# Patient Record
Sex: FEMALE | Race: WHITE | NOT HISPANIC OR LATINO | Employment: OTHER
[De-identification: names, ages, dates, MRNs, and addresses within clinical notes are randomized per-mention and may not be internally consistent; named-entity substitution may affect disease eponyms.]

---

## 2018-10-09 ENCOUNTER — TRANSCRIBE ORDERS (OUTPATIENT)
Dept: SCHEDULING | Age: 68
End: 2018-10-09

## 2018-10-09 DIAGNOSIS — M85.80 OTHER SPECIFIED DISORDERS OF BONE DENSITY AND STRUCTURE, UNSPECIFIED SITE: Primary | ICD-10-CM

## 2018-10-17 ENCOUNTER — HOSPITAL ENCOUNTER (OUTPATIENT)
Dept: RADIOLOGY | Age: 68
Discharge: HOME | End: 2018-10-17
Attending: OBSTETRICS & GYNECOLOGY
Payer: MEDICARE

## 2018-10-17 DIAGNOSIS — M85.80 OTHER SPECIFIED DISORDERS OF BONE DENSITY AND STRUCTURE, UNSPECIFIED SITE: ICD-10-CM

## 2018-10-17 PROCEDURE — 77080 DXA BONE DENSITY AXIAL: CPT | Mod: GA

## 2018-12-12 ENCOUNTER — OFFICE VISIT (OUTPATIENT)
Dept: PRIMARY CARE | Facility: CLINIC | Age: 68
End: 2018-12-12
Payer: MEDICARE

## 2018-12-12 ENCOUNTER — APPOINTMENT (OUTPATIENT)
Dept: LAB | Facility: CLINIC | Age: 68
End: 2018-12-12
Attending: FAMILY MEDICINE
Payer: MEDICARE

## 2018-12-12 VITALS
WEIGHT: 136 LBS | SYSTOLIC BLOOD PRESSURE: 108 MMHG | TEMPERATURE: 97.8 F | OXYGEN SATURATION: 96 % | RESPIRATION RATE: 16 BRPM | DIASTOLIC BLOOD PRESSURE: 72 MMHG | HEART RATE: 78 BPM

## 2018-12-12 DIAGNOSIS — E55.9 VITAMIN D DEFICIENCY: ICD-10-CM

## 2018-12-12 DIAGNOSIS — E78.2 MIXED HYPERLIPIDEMIA: ICD-10-CM

## 2018-12-12 DIAGNOSIS — R73.9 HYPERGLYCEMIA: Primary | ICD-10-CM

## 2018-12-12 DIAGNOSIS — Z11.59 ENCOUNTER FOR HEPATITIS C SCREENING TEST FOR LOW RISK PATIENT: ICD-10-CM

## 2018-12-12 DIAGNOSIS — Z85.3 HISTORY OF CANCER OF RIGHT BREAST: ICD-10-CM

## 2018-12-12 DIAGNOSIS — Z13.89 SCREENING FOR MULTIPLE CONDITIONS: ICD-10-CM

## 2018-12-12 DIAGNOSIS — R53.83 FATIGUE, UNSPECIFIED TYPE: ICD-10-CM

## 2018-12-12 LAB
25(OH)D3 SERPL-MCNC: 25 NG/ML (ref 30–100)
TSH SERPL DL<=0.05 MIU/L-ACNC: 1.84 MIU/L (ref 0.34–5.6)

## 2018-12-12 PROCEDURE — 86803 HEPATITIS C AB TEST: CPT

## 2018-12-12 PROCEDURE — 99214 OFFICE O/P EST MOD 30 MIN: CPT | Performed by: FAMILY MEDICINE

## 2018-12-12 PROCEDURE — 84443 ASSAY THYROID STIM HORMONE: CPT

## 2018-12-12 PROCEDURE — 82306 VITAMIN D 25 HYDROXY: CPT

## 2018-12-12 PROCEDURE — 36415 COLL VENOUS BLD VENIPUNCTURE: CPT

## 2018-12-12 RX ORDER — ASPIRIN 81 MG/1
81 TABLET ORAL DAILY
COMMUNITY
Start: 2015-10-26

## 2018-12-12 RX ORDER — EZETIMIBE 10 MG/1
10 TABLET ORAL DAILY
COMMUNITY
Start: 2018-11-05 | End: 2021-07-29 | Stop reason: SDUPTHER

## 2018-12-12 RX ORDER — ROSUVASTATIN CALCIUM 10 MG/1
10 TABLET, COATED ORAL DAILY
COMMUNITY
End: 2020-07-08

## 2018-12-12 ASSESSMENT — ENCOUNTER SYMPTOMS
ABDOMINAL DISTENTION: 0
ACTIVITY CHANGE: 0
LIGHT-HEADEDNESS: 0
PALPITATIONS: 0
STRIDOR: 0
VOICE CHANGE: 0
SPEECH DIFFICULTY: 0
CHEST TIGHTNESS: 0
CHOKING: 0
FACIAL ASYMMETRY: 0
BLOOD IN STOOL: 0
MYALGIAS: 0
POLYPHAGIA: 0
SEIZURES: 0
DYSURIA: 0
POLYDIPSIA: 0
COLOR CHANGE: 0
NUMBNESS: 0
JOINT SWELLING: 0
FACIAL SWELLING: 0
FREQUENCY: 0
CHILLS: 0
EYE REDNESS: 0
ANAL BLEEDING: 0
FLANK PAIN: 0
DIAPHORESIS: 0
WHEEZING: 0
EYE ITCHING: 0
PHOTOPHOBIA: 0
EYE DISCHARGE: 0
ABDOMINAL PAIN: 0
DIFFICULTY URINATING: 0
EYE PAIN: 0
FEVER: 0
VOMITING: 0

## 2018-12-12 NOTE — PROGRESS NOTES
Lashay Mejia D.O.  [facility]  [address]  [place] [address]  Phone: [phone]  Fax: [phone]     History of Present Illness   Patient ID: Ashleigh Peng is a 68 y.o. female.    Subjective     HPI  68-year-old female presents today for new patient visit.  Past medical history significant for right breast CA diagnosed in 2007 status post lumpectomy and radiation and followed by Dr. Solorzano oncology at [facility].  Last colonoscopy completed 11/8/16 patient reports was negative for polyp.  Last mammogram completed 4/9/18 through the [facility] system  Most recent DEXA scan completed 10/17/18 showing osteopenia in both hips      1.  History of right breast CA  Diagnosed in 2007 status post lumpectomy and with radiation followed by Dr. Solorzano oncology at [facility] and her GYN Dr. Jasmine.    2.  Hyperlipidemia  Stable.  She is compliant with medication along with a low-cholesterol diet.  She is monitored by cardiology and labs are usually ordered through their office.  Meds-Crestor 10 mg daily / Zetia 10 mg daily  Cardio Dr. Pereyra    3.  Hyperglycemia  Patient has had noted hyperglycemia on her most recent lab results over the past year or so averaging from 103 all the way up to 113.  Patient reports her most recent  A1c 5.6  This result being completed about 2 months ago through the cardiologist's office.       Past Medical/Surgical/Family/Social History     The following have been reviewed and updated as appropriate in this visit:  Problems         No past medical history on file.    No past surgical history on file.    No family history on file.    Social History     Social History   • Marital status:      Spouse name: N/A   • Number of children: N/A   • Years of education: N/A     Occupational History   • Not on file.     Social History Main Topics   • Smoking status: Current Every Day Smoker   • Smokeless  tobacco: Current User   • Alcohol use Yes   • Drug use: No   • Sexual activity: Not on file     Other Topics Concern   • Not on file     Social History Narrative   • No narrative on file      Allergies and Medications     Allergies   Allergen Reactions   • No Known Allergies          Current Outpatient Prescriptions:   •  aspirin 81 mg enteric coated tablet, 81 mg., Disp: , Rfl:   •  calcium carbonate/vitamin D3 (CALCIUM+D ORAL), None Entered, Disp: , Rfl:   •  ezetimibe (ZETIA) 10 mg tablet, , Disp: , Rfl:   •  rosuvastatin (CRESTOR) 10 mg tablet, once daily, Disp: , Rfl:    Review of Systems       Review of Systems   Constitutional: Negative for activity change, chills, diaphoresis and fever.   HENT: Negative for congestion, ear discharge, ear pain, facial swelling, mouth sores, nosebleeds and voice change.    Eyes: Negative for photophobia, pain, discharge, redness and itching.   Respiratory: Negative for choking, chest tightness, wheezing and stridor.    Cardiovascular: Negative for chest pain, palpitations and leg swelling.   Gastrointestinal: Negative for abdominal distention, abdominal pain, anal bleeding, blood in stool and vomiting.   Endocrine: Negative for cold intolerance, heat intolerance, polydipsia and polyphagia.   Genitourinary: Negative for difficulty urinating, dysuria, flank pain, frequency and urgency.   Musculoskeletal: Negative for gait problem, joint swelling and myalgias.   Skin: Negative for color change, pallor and rash.   Neurological: Negative for seizures, facial asymmetry, speech difficulty, light-headedness and numbness.      Physical Examination       Objective     Vitals:    12/12/18 1110   BP: 108/72   Pulse: 78   Resp: 16   Temp: 36.6 °C (97.8 °F)   SpO2: 96%       Vitals:    12/12/18 1110   BP: 108/72   BP Location: Left upper arm   Patient Position: Sitting   Pulse: 78   Resp: 16   Temp: 36.6 °C (97.8 °F)   TempSrc: Oral   SpO2: 96%   Weight: 61.7 kg (136 lb)       Wt Readings  from Last 5 Encounters:   12/12/18 61.7 kg (136 lb)       Ht Readings from Last 5 Encounters:   No data found for Ht       BP Readings from Last 5 Encounters:   12/12/18 108/72       Physical Exam   Constitutional: She is oriented to person, place, and time. She appears well-developed and well-nourished.   HENT:   Head: Normocephalic and atraumatic.   Right Ear: External ear normal.   Left Ear: External ear normal.   Eyes: Conjunctivae and EOM are normal. Pupils are equal, round, and reactive to light.   Neck: Normal range of motion. Neck supple. No JVD present. No tracheal deviation present. No thyromegaly present.   Cardiovascular: Normal rate, regular rhythm, normal heart sounds and intact distal pulses.    Pulmonary/Chest: Effort normal and breath sounds normal. No respiratory distress. She has no wheezes.   Musculoskeletal: She exhibits no edema, tenderness or deformity.   Lymphadenopathy:     She has no cervical adenopathy.   Neurological: She is alert and oriented to person, place, and time. She exhibits normal muscle tone. Coordination normal.   Skin: Skin is warm and dry. No rash noted. No erythema.   Psychiatric: She has a normal mood and affect.   Nursing note and vitals reviewed.     Laboratory Results     Lab Results   Component Value Date    WBC 7.39 06/21/2016    HGB 14.3 06/21/2016    HCT 43.2 06/21/2016    MCV 94.7 06/21/2016     06/21/2016          Chemistry        Component Value Date/Time     06/21/2016 0859    K 4.8 06/21/2016 0859     06/21/2016 0859    CO2 28 06/21/2016 0859    BUN 16 06/21/2016 0859    CREATININE 1.0 06/21/2016 0859        Component Value Date/Time    CALCIUM 9.5 06/21/2016 0859    ALKPHOS 64 06/21/2016 0859    AST 30 06/21/2016 0859    ALT 26 06/21/2016 0859    BILITOT 0.3 06/21/2016 0859            Lab Results   Component Value Date    GLUCOSE 113 (H) 06/21/2016    GLUCOSE 104 (H) 06/03/2015    CALCIUM 9.5 06/21/2016     06/21/2016    K 4.8  06/21/2016    CO2 28 06/21/2016     06/21/2016    BUN 16 06/21/2016    CREATININE 1.0 06/21/2016       Lab Results   Component Value Date    CHOL 207 (H) 06/21/2016    CHOL 196 12/24/2015    CHOL 184 06/03/2015     Lab Results   Component Value Date    HDL 71 06/21/2016    HDL 58 12/24/2015    HDL 62 06/03/2015     Lab Results   Component Value Date    LDLCALC 123 (H) 06/21/2016    LDLCALC 123 (H) 12/24/2015    LDLCALC 113 (H) 06/03/2015     Lab Results   Component Value Date    TRIG 66 06/21/2016    TRIG 73 12/24/2015    TRIG 45 06/03/2015     No results found for: CHOLHDL    No results found for: TSH    Lab Results   Component Value Date    HGBA1C 5.6 06/21/2016    HGBA1C 5.3 06/03/2015       No results found for: HEPCAB      Immunization History   Administered Date(s) Administered   • Hepatitis A 01/15/2015, 06/06/2015   • Influenza Split High Dose Preservative Free IM 10/26/2015   • Influenza Vaccine Quadrivalent 3 Yr And Older 01/01/2009, 01/01/2010, 01/01/2011, 01/01/2012, 01/01/2013, 01/01/2014   • Influenza Vaccine Quadrivalent Preservative Free 6-35 Months 10/31/2016   • Influenza, Unspecified 11/20/2013, 11/01/2016, 10/01/2018   • Pneumococcal Conjugate 13-Valent 10/26/2015   • Pneumococcal Polysaccharide 10/12/2011   • Tdap 10/10/2010   • Zoster 12/09/2015       Health Maintenance Topics with due status: Overdue       Topic Date Due    Medicare Annual Wellness Visit 1950    Hepatitis C Screening 1950    Annual Falls Risk Screening 09/14/2015    Pneumococcal 65+ Years/ High and Highest Risk 10/12/2016     Health Maintenance Topics with due status: Not Due       Topic Last Completion Date    DTaP, Tdap, and Td Vaccines 10/10/2010    Colonoscopy 11/08/2016    Mammogram 04/09/2018    DEXA Scan 10/17/2018     Health Maintenance Topics with due status: Completed / Addressed / Aged Out       Topic Last Completion Date    Zoster Vaccines 12/09/2015    Influenza Vaccine 10/01/2018    HPV Vaccines  Aged Out    Meningococcal Vaccine Aged Out    HIB Vaccines Aged Out    IPV Vaccines Aged Out          Assessment and Plan       Assessment/Plan     Problem List Items Addressed This Visit     Hyperlipidemia    Overview     Overview:          Current Assessment & Plan     Continue with Crestor and Zetia along with a low-cholesterol diet.  Await old records.         Relevant Medications    rosuvastatin (CRESTOR) 10 mg tablet    ezetimibe (ZETIA) 10 mg tablet    Hyperglycemia - Primary    Current Assessment & Plan     Stable dietary lifestyle modification.         History of cancer of right breast    Current Assessment & Plan     Patient will continue with routine follow-up with Dr. [facility] at [facility].         Screening for multiple conditions    Current Assessment & Plan     Hepatitis C screen was ordered along with a TSH and vitamin D3.  Await old records.  She is up-to-date for mammogram, DEXA scan and colonoscopy.  Vaccines are up-to-date.  We will put her on the list for Shingrix vaccine.           Other Visit Diagnoses     Encounter for hepatitis C screening test for low risk patient        Relevant Orders    Hepatitis C antibody    Vitamin D deficiency        Relevant Orders    Vitamin D 25 hydroxy    Fatigue, unspecified type        Relevant Orders    TSH 3rd Generation          No Follow-up on file.    Orders Placed This Encounter   Procedures   • Hepatitis C antibody     Standing Status:   Future     Number of Occurrences:   1     Standing Expiration Date:   12/12/2019     Order Specific Question:   Has the patient fasted?     Answer:   No   • Vitamin D 25 hydroxy     Standing Status:   Future     Number of Occurrences:   1     Standing Expiration Date:   12/12/2019   • TSH 3rd Generation     Standing Status:   Future     Number of Occurrences:   1     Standing Expiration Date:   12/12/2019     Order Specific Question:   Has the patient fasted?     Answer:   No          Av Mejia  DO  12/12/2018

## 2018-12-12 NOTE — ASSESSMENT & PLAN NOTE
Hepatitis C screen was ordered along with a TSH and vitamin D3.  Await old records.  She is up-to-date for mammogram, DEXA scan and colonoscopy.  Vaccines are up-to-date.  We will put her on the list for Shingrix vaccine.

## 2018-12-13 LAB — HCV AB SER QL: NONREACTIVE

## 2018-12-17 NOTE — PROGRESS NOTES
Please notify patient lab results show: Normal  thyroid function.  Hepatitis C screening test was Negative.      Vit D is low Recommend taking Vit D3 otc 2000 iu Qd.

## 2018-12-18 ENCOUNTER — TELEPHONE (OUTPATIENT)
Dept: PRIMARY CARE | Facility: CLINIC | Age: 68
End: 2018-12-18

## 2018-12-18 NOTE — TELEPHONE ENCOUNTER
Informed pt of her result through portal         ----- Message from Av Mejia DO sent at 12/16/2018  8:47 PM EST -----  Please notify patient lab results show: Normal  thyroid function.  Hepatitis C screening test was Negative.      Vit D is low Recommend taking Vit D3 otc 2000 iu Qd.

## 2019-05-29 PROBLEM — Z13.89 SCREENING FOR MULTIPLE CONDITIONS: Status: RESOLVED | Noted: 2018-12-12 | Resolved: 2019-05-29

## 2019-05-29 PROBLEM — Z85.3 HISTORY OF CANCER OF RIGHT BREAST: Status: RESOLVED | Noted: 2018-12-12 | Resolved: 2019-05-29

## 2019-05-30 ENCOUNTER — OFFICE VISIT (OUTPATIENT)
Dept: PRIMARY CARE | Facility: CLINIC | Age: 69
End: 2019-05-30
Payer: MEDICARE

## 2019-05-30 VITALS
HEART RATE: 87 BPM | TEMPERATURE: 98 F | OXYGEN SATURATION: 67 % | SYSTOLIC BLOOD PRESSURE: 122 MMHG | DIASTOLIC BLOOD PRESSURE: 70 MMHG | RESPIRATION RATE: 16 BRPM

## 2019-05-30 DIAGNOSIS — J01.40 ACUTE NON-RECURRENT PANSINUSITIS: Primary | ICD-10-CM

## 2019-05-30 PROCEDURE — 99214 OFFICE O/P EST MOD 30 MIN: CPT | Performed by: NURSE PRACTITIONER

## 2019-05-30 RX ORDER — AMOXICILLIN AND CLAVULANATE POTASSIUM 875; 125 MG/1; MG/1
1 TABLET, FILM COATED ORAL 2 TIMES DAILY
Qty: 20 TABLET | Refills: 0 | Status: SHIPPED | OUTPATIENT
Start: 2019-05-30 | End: 2019-07-09

## 2019-05-30 RX ORDER — FLUTICASONE PROPIONATE 50 MCG
1 SPRAY, SUSPENSION (ML) NASAL DAILY
Qty: 1 BOTTLE | Refills: 0 | Status: SHIPPED | OUTPATIENT
Start: 2019-05-30 | End: 2020-07-08 | Stop reason: ALTCHOICE

## 2019-05-30 ASSESSMENT — ENCOUNTER SYMPTOMS
SINUS PAIN: 0
MYALGIAS: 0
SORE THROAT: 0
COUGH: 1
FEVER: 0
SINUS PRESSURE: 1
RHINORRHEA: 1
STRIDOR: 0
CHEST TIGHTNESS: 0
TROUBLE SWALLOWING: 0
ADENOPATHY: 0
VOMITING: 0
DIAPHORESIS: 0
FATIGUE: 1
SHORTNESS OF BREATH: 0
DIARRHEA: 0
NAUSEA: 0
DIZZINESS: 0
PALPITATIONS: 0
WEAKNESS: 0
WHEEZING: 0
CHILLS: 0
ABDOMINAL PAIN: 0
DYSURIA: 0
APPETITE CHANGE: 0

## 2019-05-30 NOTE — PROGRESS NOTES
Loren Grubbs, TRACE, CRNP, FNP-BC  [facility] [place]  [address]  [place] [address]  Phone: [phone]  Fax: [phone]     History of Present Illness     Subjective     Patient ID: Ashleigh Peng is a 68 y.o. female.    69 yo female pt of Dr. Mejia's presenting today for evaluation of a combination of symptoms which onset almost 3-4 weeks ago, with acute worsening about 10-11 days ago and no improvement with OTC remedies. The patient reports PND, sinus congestion, nasal congestion, sinus pressure at the left side (maxillary and ethmoidal), subjective tiredness and non-productive cough. She didn't think she had a sinus infection, as her nasal mucous was fluctuating between clear and yellow, but no green mucous. 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 She denies sinus pain, fever, chills, myalgias, joint pains and/or aches, chest congestion, wheezing, SOB, VU, productive cough, nausea, vomiting, diarrhea, constipation, chest pain, vision changes, tinnitus, hearing loss, ear pressure/popping, headache and abdominal pain. Has used Mucinex OTC so far.     She has young grandchildren, but no one is sick at this time. Denies PMH of recurrent infections or complicated infections (PNA, COPD, bronchitis, sinus infections, etc). Denies any recent abx use or changes in medications. Patient does have a history of allergic rhinitis as well.          Past Medical/Surgical/Family/Social History     The following have been reviewed and updated as appropriate in this visit:  Allergies  Meds  Problems         Past Medical History:   Diagnosis Date   • Hyperglycemia 12/12/2018   • Hyperlipidemia 10/26/2015    Overview:    • Malignant neoplasm of female breast (CMS/HCC) (HCC) 3/1/2007    Overview:  Dx  BREAST CANCER, Dx Date 3/2007, Histology  , Stage @ Dx  T1,N0, ER/NV+ HERII+, Disease Status  SIN, Tx Status TREATMENT  ONGOING, Comments         History reviewed. No pertinent surgical history.    History reviewed. No pertinent family history.    Social History     Social History   • Marital status:      Spouse name: N/A   • Number of children: N/A   • Years of education: N/A     Occupational History   • Not on file.     Social History Main Topics   • Smoking status: Current Every Day Smoker   • Smokeless tobacco: Current User   • Alcohol use Yes   • Drug use: No   • Sexual activity: Not on file     Other Topics Concern   • Not on file     Social History Narrative   • No narrative on file        Allergies and Medications     Allergies   Allergen Reactions   • No Known Allergies        Current Outpatient Prescriptions   Medication Sig Dispense Refill   • amoxicillin-pot clavulanate (AUGMENTIN) 875-125 mg per tablet Take 1 tablet by mouth 2 (two) times a day for 10 days. 20 tablet 0   • aspirin 81 mg enteric coated tablet 81 mg.     • calcium carbonate/vitamin D3 (CALCIUM+D ORAL) None Entered     • ezetimibe (ZETIA) 10 mg tablet      • fluticasone propionate (FLONASE) 50 mcg/actuation nasal spray Administer 1 spray into each nostril daily. 1 Bottle 0   • rosuvastatin (CRESTOR) 10 mg tablet once daily       No current facility-administered medications for this visit.         Review of Systems     Review of Systems   Constitutional: Positive for fatigue (tiredness). Negative for appetite change, chills, diaphoresis and fever.   HENT: Positive for congestion, postnasal drip, rhinorrhea and sinus pressure (left ethmoidal/maxillary sinus). Negative for ear pain, hearing loss, sinus pain, sore throat and trouble swallowing.    Respiratory: Positive for cough (irritating cough, dry). Negative for chest tightness, shortness of breath, wheezing and stridor.    Cardiovascular: Negative for chest pain and palpitations.   Gastrointestinal: Negative for abdominal pain, diarrhea, nausea and vomiting.   Genitourinary: Negative for dysuria.    Musculoskeletal: Negative for myalgias.   Skin: Negative for rash.   Neurological: Negative for dizziness and weakness.   Hematological: Negative for adenopathy.        Physical Examination     Objective     Vitals:    05/30/19 1041   BP: 122/70   BP Location: Right upper arm   Patient Position: Sitting   Pulse: 87   Resp: 16   Temp: 36.7 °C (98 °F)   TempSrc: Oral   SpO2: (!) 67%       Wt Readings from Last 2 Encounters:   12/12/18 61.7 kg (136 lb)       Ht Readings from Last 2 Encounters:   No data found for Ht       BP Readings from Last 2 Encounters:   05/30/19 122/70   12/12/18 108/72       Physical Exam   Constitutional: She is oriented to person, place, and time. Vital signs are normal.  Non-toxic appearance. She does not have a sickly appearance. She does not appear ill. No distress.   HENT:   Head: Normocephalic and atraumatic.   Right Ear: Hearing, tympanic membrane, external ear and ear canal normal.   Left Ear: Hearing, tympanic membrane, external ear and ear canal normal.   Nose: Mucosal edema (L > R) and rhinorrhea present. Right sinus exhibits no maxillary sinus tenderness and no frontal sinus tenderness. Left sinus exhibits maxillary sinus tenderness (maxillary and ethmoidal regions, pressure and some tenderness with palpation). Left sinus exhibits no frontal sinus tenderness.   Mouth/Throat: Uvula is midline and mucous membranes are normal. Posterior oropharyngeal erythema (c/w PND pattern) present. No oropharyngeal exudate.   Eyes: Conjunctivae and lids are normal. Lids are everted and swept, no foreign bodies found.   Neck: Trachea normal, normal range of motion and full passive range of motion without pain. Neck supple.   Cardiovascular: Normal rate, regular rhythm and normal heart sounds.    Pulmonary/Chest: Effort normal and breath sounds normal. No accessory muscle usage. No tachypnea. No respiratory distress. She has no wheezes.   Lymphadenopathy:        Head (right side): No submental and  no submandibular adenopathy present.        Head (left side): No submental and no submandibular adenopathy present.     She has no cervical adenopathy.   Neurological: She is alert and oriented to person, place, and time.   Skin: Skin is warm, dry and intact.   Psychiatric: She has a normal mood and affect. Her behavior is normal. Judgment and thought content normal.   Nursing note and vitals reviewed.       Laboratory Results     Lab Results   Component Value Date    WBC 7.39 06/21/2016    WBC 5.57 06/03/2015    HGB 14.3 06/21/2016    HGB 13.5 06/03/2015    HCT 43.2 06/21/2016    HCT 40.4 06/03/2015    MCV 94.7 06/21/2016    MCV 95.5 06/03/2015     06/21/2016     06/03/2015        Lab Results   Component Value Date    GLUCOSE 113 (H) 06/21/2016    GLUCOSE 104 (H) 06/03/2015    CALCIUM 9.5 06/21/2016    CALCIUM 9.6 06/03/2015     06/21/2016     06/03/2015    K 4.8 06/21/2016    K 3.8 06/03/2015    CO2 28 06/21/2016    CO2 27 06/03/2015     06/21/2016     06/03/2015    BUN 16 06/21/2016    BUN 14 06/03/2015    CREATININE 1.0 06/21/2016    CREATININE 0.9 06/03/2015    ALKPHOS 64 06/21/2016    ALKPHOS 62 06/03/2015    AST 30 06/21/2016    AST 26 06/03/2015    ALT 26 06/21/2016    ALT 20 06/03/2015    BILITOT 0.3 06/21/2016    BILITOT 0.5 06/03/2015    ALBUMIN 4.3 06/21/2016    ALBUMIN 4.3 06/03/2015       Lab Results   Component Value Date    CHOL 207 (H) 06/21/2016    CHOL 196 12/24/2015     Lab Results   Component Value Date    HDL 71 06/21/2016    HDL 58 12/24/2015     Lab Results   Component Value Date    LDLCALC 123 (H) 06/21/2016    LDLCALC 123 (H) 12/24/2015     Lab Results   Component Value Date    TRIG 66 06/21/2016    TRIG 73 12/24/2015       Lab Results   Component Value Date    TSH 1.84 12/12/2018       Lab Results   Component Value Date    HGBA1C 5.6 06/21/2016    HGBA1C 5.3 06/03/2015       Lab Results   Component Value Date    HEPCAB Nonreactive 12/12/2018       No  results found for: MICROALBCREA    Immunization History   Administered Date(s) Administered   • Hepatitis A 01/15/2015, 06/06/2015   • Influenza Split High Dose Preservative Free IM 10/26/2015   • Influenza Vaccine Quadrivalent 3 Yr And Older 01/01/2009, 01/01/2010, 01/01/2011, 01/01/2012, 01/01/2013, 01/01/2014   • Influenza Vaccine Quadrivalent Preservative Free 6-35 Months 10/31/2016   • Influenza, Unspecified 11/20/2013, 11/01/2016, 10/01/2018   • Pneumococcal Conjugate 13-Valent 10/26/2015   • Pneumococcal Polysaccharide 10/12/2011   • Tdap 10/10/2010   • Zoster 12/09/2015       Health Maintenance Topics with due status: Overdue       Topic Date Due    Medicare Annual Wellness Visit 09/14/2015    Annual Falls Risk Screening 09/14/2015    Zoster Vaccine 02/03/2016    Pneumococcal (65 years and older) 10/26/2016     Health Maintenance Topics with due status: Not Due       Topic Last Completion Date    DTaP, Tdap, and Td Vaccines 10/10/2010    Colonoscopy 11/08/2016    Mammogram 04/09/2018    DEXA Scan 10/17/2018     Health Maintenance Topics with due status: Completed / Addressed / Aged Out       Topic Last Completion Date    Pneumococcal 10/26/2015    Influenza Vaccine 10/01/2018    Hepatitis C Screening 12/12/2018    Meningococcal ACWY Aged Out    Varicella Vaccines Aged Out    HIB Vaccines Aged Out    IPV Vaccines Aged Out    HPV Vaccines Aged Out        Assessment and Plan     Assessment/Plan     Problem List Items Addressed This Visit     Acute non-recurrent pansinusitis - Primary    Current Assessment & Plan     See HPI/PE/ROS.   · Augmentin 875 mg BID x 10 days prescribed. Take with food or probiotic as prescribed.   · Sent Flonase to RX for daily regimen. Instructed to increase to BID use if no relief after 3-4 days. May continue this regimen for 2 weeks or longer if needed.   · Continue Mucinex for dry cough (likely PND-induced), and stated she could try Mucinex-D for decongestant properties as well.  Will take exactly as written on packaging.   · Pt instructed to return to office in 7-10 days if symptoms persist or fail to improve.  · Education re: proper hand hygiene, infection prevention, increasing PO fluid intake, and rest while recovering.  · Pt denied any questions or concerns at this time, and verbalized an understanding of all education/instructions.   · Would recommend ENT evaluation or medrol dose taper in the future if symptoms continue to persist.                    No Follow-up on file.    No orders of the defined types were placed in this encounter.           Loren Grubbs DNP, CRNP, FNP-BC    5/30/2019

## 2019-05-30 NOTE — ASSESSMENT & PLAN NOTE
See HPI/PE/ROS.   · Augmentin 875 mg BID x 10 days prescribed. Take with food or probiotic as prescribed.   · Sent Flonase to RX for daily regimen. Instructed to increase to BID use if no relief after 3-4 days. May continue this regimen for 2 weeks or longer if needed.   · Continue Mucinex for dry cough (likely PND-induced), and stated she could try Mucinex-D for decongestant properties as well. Will take exactly as written on packaging.   · Pt instructed to return to office in 7-10 days if symptoms persist or fail to improve.  · Education re: proper hand hygiene, infection prevention, increasing PO fluid intake, and rest while recovering.  · Pt denied any questions or concerns at this time, and verbalized an understanding of all education/instructions.   · Would recommend ENT evaluation or medrol dose taper in the future if symptoms continue to persist.

## 2019-05-30 NOTE — PATIENT INSTRUCTIONS
· Take the antibiotic exactly as prescribed with food or yogurt. You may also add an over-the-counter probiotic for enhanced benefit and GI protection while taking an antibiotic. Take the Augmentin as directed: One tablet twice per day.  · You may use Mucinex or Mucinex-DM for cough and congestion symptoms throughout the day. This medication contains ingredients and components, so please call your doctor or ask the pharmacist before adding additional Tylenol, ibuprofen, or cough remedies. Take the medication exactly as prescribed on the packaging.  · You may use Flonase nasal spray daily for symptoms of congestion and sinus/nasal pressure.  · Increase sleep and rest, and please let us know if you need an excuse for work or school absence.   · Increase fluids with water, fruit juices and Vitamin-C, and orange juice.  · Please call the office if you feel no improvement in your symptoms by 6/6/19. At this time, I would recommend following with your primary care doctor for further management.   · Present to the ER if you experience a worsening of symptoms, or if you experience the following new symptoms: chest pain, shortness of breath, dyspnea, vomiting, excessive or severe nausea, recurrent and/or frequent diarrhea, severe new pain, new onset headache, vision changes or ringing in the ears, syncope or syncopal episodes. Present for any notice of abnormal or unexpected bleeding. Call the office for any further questions or concerns.

## 2019-07-09 ENCOUNTER — OFFICE VISIT (OUTPATIENT)
Dept: PRIMARY CARE | Facility: CLINIC | Age: 69
End: 2019-07-09
Payer: MEDICARE

## 2019-07-09 VITALS
DIASTOLIC BLOOD PRESSURE: 62 MMHG | HEIGHT: 68 IN | SYSTOLIC BLOOD PRESSURE: 122 MMHG | RESPIRATION RATE: 16 BRPM | HEART RATE: 89 BPM | BODY MASS INDEX: 19.55 KG/M2 | TEMPERATURE: 99 F | OXYGEN SATURATION: 94 % | WEIGHT: 129 LBS

## 2019-07-09 DIAGNOSIS — E78.2 MIXED HYPERLIPIDEMIA: ICD-10-CM

## 2019-07-09 DIAGNOSIS — L03.115 CELLULITIS OF RIGHT LOWER EXTREMITY: Primary | ICD-10-CM

## 2019-07-09 PROCEDURE — 99214 OFFICE O/P EST MOD 30 MIN: CPT | Performed by: FAMILY MEDICINE

## 2019-07-09 RX ORDER — CEPHALEXIN 500 MG/1
500 CAPSULE ORAL 3 TIMES DAILY
Qty: 21 CAPSULE | Refills: 0 | Status: SHIPPED | OUTPATIENT
Start: 2019-07-09 | End: 2019-07-15 | Stop reason: SDUPTHER

## 2019-07-09 ASSESSMENT — ENCOUNTER SYMPTOMS
ANAL BLEEDING: 0
EYE ITCHING: 0
FEVER: 0
DIAPHORESIS: 0
CHILLS: 0
SEIZURES: 0
PHOTOPHOBIA: 0
FLANK PAIN: 0
EYE REDNESS: 0
ABDOMINAL DISTENTION: 0
VOICE CHANGE: 0
STRIDOR: 0
WHEEZING: 0
ABDOMINAL PAIN: 0
COLOR CHANGE: 0
JOINT SWELLING: 0
EYE PAIN: 0
FACIAL ASYMMETRY: 0
ACTIVITY CHANGE: 0
FACIAL SWELLING: 0
VOMITING: 0
BLOOD IN STOOL: 0
POLYDIPSIA: 0
EYE DISCHARGE: 0
SPEECH DIFFICULTY: 0
CHOKING: 0
LIGHT-HEADEDNESS: 0
FREQUENCY: 0
DIFFICULTY URINATING: 0
PALPITATIONS: 0
CHEST TIGHTNESS: 0
MYALGIAS: 0
NUMBNESS: 0
DYSURIA: 0
POLYPHAGIA: 0

## 2019-07-09 NOTE — ASSESSMENT & PLAN NOTE
Patient appears to have an ongoing cellulitis on both hips more so on the right and was started on Keflex 500 mg 3 times daily until finished.  Recommended warm compresses tolerated to the area.  If no significant improvements over the next 7 days she will need to come in for a follow-up visit.

## 2019-07-15 RX ORDER — CEPHALEXIN 500 MG/1
500 CAPSULE ORAL 3 TIMES DAILY
Qty: 21 CAPSULE | Refills: 0 | Status: SHIPPED | OUTPATIENT
Start: 2019-07-15 | End: 2019-07-18

## 2019-07-15 NOTE — TELEPHONE ENCOUNTER
Pharmacy is updated in the system, antibiotic just needs sign off. Spoke with pt and advised her on starting a probiotic and informed her where she can purchase this.

## 2019-07-15 NOTE — TELEPHONE ENCOUNTER
Her cellulitis at both hips and down legs is no longer indurated and red, but is still pink and tender.  She thinks it's going away and since she let it go for so long initially, she thinks she may need to continue treatment for 10-14 days or switch the antibiotic.  She is requesting that you call her.    She is currently at the [facility] so if you plan to continue an antibiotic, she will need to provide pharmacy information

## 2019-07-15 NOTE — TELEPHONE ENCOUNTER
See Larissa's note below, pt would like to know if she should get another round of antibiotics for her cellulitis or if the track that it is on is ok, please advise.

## 2019-07-18 ENCOUNTER — TELEPHONE (OUTPATIENT)
Dept: PRIMARY CARE | Facility: CLINIC | Age: 69
End: 2019-07-18

## 2019-07-18 RX ORDER — CLINDAMYCIN HYDROCHLORIDE 300 MG/1
300 CAPSULE ORAL 3 TIMES DAILY
Qty: 30 CAPSULE | Refills: 0 | Status: SHIPPED | OUTPATIENT
Start: 2019-07-18 | End: 2019-08-19 | Stop reason: ALTCHOICE

## 2019-07-18 NOTE — TELEPHONE ENCOUNTER
See Stefany's note below, pt is calling in reference to the cellulitis on her leg and the fact that it is getting worse, she is asking to speak specifically to you.

## 2019-07-18 NOTE — TELEPHONE ENCOUNTER
Pt called was seen last Tuesday and was diagnosed with cellulitis.  She stated she took the antibiotics that were prescribed.   She states that it is getting worse.  She would like a call back at [phone].    Amanda called again asking when the doctor will call her.  Informed patient that Dr. Mejia is seeing patients and he will call at some point.

## 2019-07-18 NOTE — TELEPHONE ENCOUNTER
Spoke with patient regarding the rash that she has on both hips with some induration she felt Keflex initially helped but she called for refill took 2 or 3 days on the refill dose and now feels that the rash is getting worse more red.  She denies hip pain, fever, chills, night sweats or diarrhea.  We will switch the antibiotic to clindamycin 300 mg 3 times daily for the next 10 days.  She will continue with the probiotic.  I asked her to see a dermatologist as soon as possible for evaluation of this rash/cellulitis.  She will call back with any ongoing concerns.

## 2019-07-30 ENCOUNTER — TELEPHONE (OUTPATIENT)
Dept: PRIMARY CARE | Facility: CLINIC | Age: 69
End: 2019-07-30

## 2019-07-30 DIAGNOSIS — L03.115 CELLULITIS OF RIGHT LOWER EXTREMITY: Primary | ICD-10-CM

## 2019-07-30 DIAGNOSIS — R53.83 OTHER FATIGUE: ICD-10-CM

## 2019-07-30 NOTE — TELEPHONE ENCOUNTER
Pt went to dermatologist for rash on hip area which is now pink and warm.  Dermatologist recommend pt have lyme titers, suleman and any other b/w you would like to order.  Pt states she finished antibiotics and would like a call once b/w is ordered

## 2019-07-30 NOTE — TELEPHONE ENCOUNTER
Please let her know lab work was ordered.  Please ask her why if the dermatologist requested lab work why didn't  they order it?

## 2019-07-30 NOTE — TELEPHONE ENCOUNTER
Dermatologist that pt saw is recommending labs be put into the system for her based on her rash. Lyme titers, suleman, and any other b/w you see fit was advised.

## 2019-07-31 ENCOUNTER — APPOINTMENT (OUTPATIENT)
Dept: LAB | Facility: CLINIC | Age: 69
End: 2019-07-31
Attending: FAMILY MEDICINE
Payer: MEDICARE

## 2019-07-31 DIAGNOSIS — L03.115 CELLULITIS OF RIGHT LOWER EXTREMITY: ICD-10-CM

## 2019-07-31 DIAGNOSIS — R53.83 OTHER FATIGUE: ICD-10-CM

## 2019-07-31 LAB
ALBUMIN SERPL-MCNC: 3.6 G/DL (ref 3.4–5)
ALP SERPL-CCNC: 62 IU/L (ref 35–126)
ALT SERPL-CCNC: 13 IU/L (ref 11–54)
ANION GAP SERPL CALC-SCNC: 9 MEQ/L (ref 3–15)
AST SERPL-CCNC: 19 IU/L (ref 15–41)
BASOPHILS # BLD: 0.02 K/UL (ref 0.01–0.1)
BASOPHILS NFR BLD: 0.3 %
BILIRUB SERPL-MCNC: 0.8 MG/DL (ref 0.3–1.2)
BUN SERPL-MCNC: 10 MG/DL (ref 8–20)
CALCIUM SERPL-MCNC: 9.3 MG/DL (ref 8.9–10.3)
CHLORIDE SERPL-SCNC: 106 MEQ/L (ref 98–109)
CO2 SERPL-SCNC: 27 MEQ/L (ref 22–32)
CREAT SERPL-MCNC: 1 MG/DL
DIFFERENTIAL METHOD BLD: NORMAL
EOSINOPHIL # BLD: 0.23 K/UL (ref 0.04–0.36)
EOSINOPHIL NFR BLD: 3.4 %
ERYTHROCYTE [DISTWIDTH] IN BLOOD BY AUTOMATED COUNT: 12.8 % (ref 11.7–14.4)
ERYTHROCYTE [SEDIMENTATION RATE] IN BLOOD BY WESTERGREN METHOD: 7 MM/HR
GFR SERPL CREATININE-BSD FRML MDRD: 55.1 ML/MIN/1.73M*2
GLUCOSE SERPL-MCNC: 92 MG/DL (ref 70–99)
HCT VFR BLDCO AUTO: 39 %
HGB BLD-MCNC: 12.4 G/DL
IMM GRANULOCYTES # BLD AUTO: 0.05 K/UL (ref 0–0.08)
IMM GRANULOCYTES NFR BLD AUTO: 0.7 %
LYMPHOCYTES # BLD: 1.54 K/UL (ref 1.2–3.5)
LYMPHOCYTES NFR BLD: 23 %
MCH RBC QN AUTO: 31.3 PG (ref 28–33.2)
MCHC RBC AUTO-ENTMCNC: 31.8 G/DL (ref 32.2–35.5)
MCV RBC AUTO: 98.5 FL (ref 83–98)
MONOCYTES # BLD: 0.37 K/UL (ref 0.28–0.8)
MONOCYTES NFR BLD: 5.5 %
NEUTROPHILS # BLD: 4.49 K/UL (ref 1.7–7)
NEUTS SEG NFR BLD: 67.1 %
NRBC BLD-RTO: 0 %
PDW BLD AUTO: 8.3 FL (ref 9.4–12.3)
PLATELET # BLD AUTO: 326 K/UL
POTASSIUM SERPL-SCNC: 4.2 MEQ/L (ref 3.6–5.1)
PROT SERPL-MCNC: 6.2 G/DL (ref 6–8.2)
RBC # BLD AUTO: 3.96 M/UL (ref 3.93–5.22)
SODIUM SERPL-SCNC: 142 MEQ/L (ref 136–144)
TSH SERPL DL<=0.05 MIU/L-ACNC: 1.55 MIU/L (ref 0.34–5.6)
WBC # BLD AUTO: 6.7 K/UL

## 2019-07-31 PROCEDURE — 36415 COLL VENOUS BLD VENIPUNCTURE: CPT

## 2019-07-31 PROCEDURE — 85652 RBC SED RATE AUTOMATED: CPT

## 2019-07-31 PROCEDURE — 85025 COMPLETE CBC W/AUTO DIFF WBC: CPT

## 2019-07-31 PROCEDURE — 86038 ANTINUCLEAR ANTIBODIES: CPT

## 2019-07-31 PROCEDURE — 84443 ASSAY THYROID STIM HORMONE: CPT

## 2019-07-31 PROCEDURE — 80053 COMPREHEN METABOLIC PANEL: CPT

## 2019-07-31 PROCEDURE — 86618 LYME DISEASE ANTIBODY: CPT

## 2019-07-31 NOTE — TELEPHONE ENCOUNTER
"Informed pt that her labs were entered and she already went to lab to have them drawn. 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   "

## 2019-08-01 LAB
ANA SER QL IA: NEGATIVE
B BURGDOR AB SER IA-ACNC: 0.21 RATIO

## 2019-08-05 NOTE — PROGRESS NOTES
Rito Sotelo,  Your recent lab results are a follows:   Normal blood count, sugar, thyroid and kidney function.  Lyme Dz test is negative.  The screening autoimmune test is Negative.  Inflammatory marker was Negative.  Please follow up with Derm and let me know what they think the diagnosis is and if you may need a biopsy of the area to ascertain what is going on.

## 2019-08-19 ENCOUNTER — OFFICE VISIT (OUTPATIENT)
Dept: PRIMARY CARE | Facility: CLINIC | Age: 69
End: 2019-08-19
Payer: MEDICARE

## 2019-08-19 ENCOUNTER — APPOINTMENT (OUTPATIENT)
Dept: LAB | Facility: CLINIC | Age: 69
End: 2019-08-19
Attending: FAMILY MEDICINE
Payer: MEDICARE

## 2019-08-19 VITALS
BODY MASS INDEX: 19.22 KG/M2 | WEIGHT: 126.8 LBS | TEMPERATURE: 98.1 F | HEART RATE: 95 BPM | SYSTOLIC BLOOD PRESSURE: 118 MMHG | RESPIRATION RATE: 16 BRPM | DIASTOLIC BLOOD PRESSURE: 68 MMHG | OXYGEN SATURATION: 95 % | HEIGHT: 68 IN

## 2019-08-19 DIAGNOSIS — E53.8 VITAMIN B12 DEFICIENCY: ICD-10-CM

## 2019-08-19 DIAGNOSIS — E53.8 VITAMIN B12 DEFICIENCY: Primary | ICD-10-CM

## 2019-08-19 DIAGNOSIS — M25.50 ARTHRALGIA, UNSPECIFIED JOINT: ICD-10-CM

## 2019-08-19 PROBLEM — J01.40 ACUTE NON-RECURRENT PANSINUSITIS: Status: RESOLVED | Noted: 2019-05-30 | Resolved: 2019-08-19

## 2019-08-19 PROBLEM — M25.579 ARTHRALGIA OF ANKLE: Status: ACTIVE | Noted: 2019-08-19

## 2019-08-19 PROBLEM — M25.579 ARTHRALGIA OF ANKLE: Status: RESOLVED | Noted: 2019-08-19 | Resolved: 2019-08-19

## 2019-08-19 LAB
ALBUMIN SERPL-MCNC: 3.7 G/DL (ref 3.4–5)
ALP SERPL-CCNC: 64 IU/L (ref 35–126)
ALT SERPL-CCNC: 13 IU/L (ref 11–54)
ANION GAP SERPL CALC-SCNC: 9 MEQ/L (ref 3–15)
AST SERPL-CCNC: 20 IU/L (ref 15–41)
BASOPHILS # BLD: 0.03 K/UL (ref 0.01–0.1)
BASOPHILS NFR BLD: 0.5 %
BILIRUB SERPL-MCNC: 0.5 MG/DL (ref 0.3–1.2)
BUN SERPL-MCNC: 9 MG/DL (ref 8–20)
CALCIUM SERPL-MCNC: 9.4 MG/DL (ref 8.9–10.3)
CHLORIDE SERPL-SCNC: 105 MEQ/L (ref 98–109)
CO2 SERPL-SCNC: 27 MEQ/L (ref 22–32)
CREAT SERPL-MCNC: 0.9 MG/DL
CRP SERPL-MCNC: <=6 MG/L
DIFFERENTIAL METHOD BLD: NORMAL
EOSINOPHIL # BLD: 0.2 K/UL (ref 0.04–0.36)
EOSINOPHIL NFR BLD: 3.5 %
ERYTHROCYTE [DISTWIDTH] IN BLOOD BY AUTOMATED COUNT: 12.5 % (ref 11.7–14.4)
ERYTHROCYTE [SEDIMENTATION RATE] IN BLOOD BY WESTERGREN METHOD: 18 MM/HR
GFR SERPL CREATININE-BSD FRML MDRD: >60 ML/MIN/1.73M*2
GLUCOSE SERPL-MCNC: 111 MG/DL (ref 70–99)
HCT VFR BLDCO AUTO: 38.9 %
HGB BLD-MCNC: 12.5 G/DL
IMM GRANULOCYTES # BLD AUTO: 0.01 K/UL (ref 0–0.08)
IMM GRANULOCYTES NFR BLD AUTO: 0.2 %
LYMPHOCYTES # BLD: 1.68 K/UL (ref 1.2–3.5)
LYMPHOCYTES NFR BLD: 29.1 %
MCH RBC QN AUTO: 31.1 PG (ref 28–33.2)
MCHC RBC AUTO-ENTMCNC: 32.1 G/DL (ref 32.2–35.5)
MCV RBC AUTO: 96.8 FL (ref 83–98)
MONOCYTES # BLD: 0.34 K/UL (ref 0.28–0.8)
MONOCYTES NFR BLD: 5.9 %
NEUTROPHILS # BLD: 3.51 K/UL (ref 1.7–7)
NEUTS SEG NFR BLD: 60.8 %
NRBC BLD-RTO: 0 %
PDW BLD AUTO: 9 FL (ref 9.4–12.3)
PLATELET # BLD AUTO: 330 K/UL
POTASSIUM SERPL-SCNC: 4.3 MEQ/L (ref 3.6–5.1)
PROT SERPL-MCNC: 6.5 G/DL (ref 6–8.2)
RBC # BLD AUTO: 4.02 M/UL (ref 3.93–5.22)
SODIUM SERPL-SCNC: 141 MEQ/L (ref 136–144)
URATE SERPL-MCNC: 5.5 MG/DL (ref 2.6–8)
VIT B12 SERPL-MCNC: 229 PG/ML (ref 180–914)
WBC # BLD AUTO: 5.77 K/UL

## 2019-08-19 PROCEDURE — 84166 PROTEIN E-PHORESIS/URINE/CSF: CPT

## 2019-08-19 PROCEDURE — 82040 ASSAY OF SERUM ALBUMIN: CPT

## 2019-08-19 PROCEDURE — 85025 COMPLETE CBC W/AUTO DIFF WBC: CPT

## 2019-08-19 PROCEDURE — 86618 LYME DISEASE ANTIBODY: CPT

## 2019-08-19 PROCEDURE — 86431 RHEUMATOID FACTOR QUANT: CPT

## 2019-08-19 PROCEDURE — 84165 PROTEIN E-PHORESIS SERUM: CPT

## 2019-08-19 PROCEDURE — 86140 C-REACTIVE PROTEIN: CPT

## 2019-08-19 PROCEDURE — 86038 ANTINUCLEAR ANTIBODIES: CPT

## 2019-08-19 PROCEDURE — 36415 COLL VENOUS BLD VENIPUNCTURE: CPT

## 2019-08-19 PROCEDURE — 82607 VITAMIN B-12: CPT

## 2019-08-19 PROCEDURE — 84550 ASSAY OF BLOOD/URIC ACID: CPT

## 2019-08-19 PROCEDURE — 85652 RBC SED RATE AUTOMATED: CPT

## 2019-08-19 PROCEDURE — 99213 OFFICE O/P EST LOW 20 MIN: CPT | Performed by: FAMILY MEDICINE

## 2019-08-19 ASSESSMENT — ENCOUNTER SYMPTOMS
VOMITING: 0
BLOOD IN STOOL: 0
SHORTNESS OF BREATH: 0
FEVER: 0
ABDOMINAL PAIN: 0
COUGH: 0
NAUSEA: 0
DIARRHEA: 0
CHILLS: 0
FATIGUE: 1
JOINT SWELLING: 1
UNEXPECTED WEIGHT CHANGE: 1
ARTHRALGIAS: 1
WHEEZING: 0
PALPITATIONS: 0

## 2019-08-19 NOTE — PROGRESS NOTES
Peter Solorzano MD    [facility]  [address]  [place] [address]  Phone: [phone]  Fax: [phone]     History of Present Illness     Subjective     Patient ID: Ashleigh Peng is a 68 y.o. female.    In 06/2015, hips felt tender to touch only when pressing on it. In July the right hip had red rash, tender and red. Treated for cellulitis. Given Keflex. Never resolved. Then treated with Clindamycin. Then developed lesion on the left side. Saw dermatologist, did not think it was dermatologic in nature. Has new onset fatigue. Lost 7 pounds since 07/2019. Now having stiffness of hands and feet (bottom of feet) Has some swelling of right ankle. Also some tenderness. Has some night sweats. No recent travel. Was in [place] in 04/2019. No new medications. No sick contacts at home.          Past Medical/Surgical/Family/Social History       The following have been reviewed and updated as appropriate in this visit:  Allergies  Meds  Problems         Past Medical History:   Diagnosis Date   • Hyperglycemia 12/12/2018   • Hyperlipidemia 10/26/2015    Overview:    • Malignant neoplasm of female breast (CMS/HCC) (HCC) 3/1/2007    Overview:  Dx  BREAST CANCER, Dx Date 3/2007, Histology  , Stage @ Dx  T1,N0, ER/DE+ HERII+, Disease Status  SIN, Tx Status TREATMENT ONGOING, Comments         No past surgical history on file.    No family history on file.    Social History     Social History   • Marital status:      Spouse name: N/A   • Number of children: N/A   • Years of education: N/A     Occupational History   • Not on file.     Social History Main Topics   • Smoking status: Current Every Day Smoker   • Smokeless tobacco: Current User   • Alcohol use Yes   • Drug use: No   • Sexual activity: Not on file     Other Topics Concern   • Not on file     Social History Narrative   • No narrative on file       Patient Care Team:  Av Mejia DO as PCP - General (Family  "Medicine)  Pepe Jasmine MD as Obstetrician (Obstetrics and Gynecology)     Allergies and Medications       Allergies   Allergen Reactions   • No Known Allergies        Current Outpatient Prescriptions   Medication Sig Dispense Refill   • aspirin 81 mg enteric coated tablet 81 mg.     • calcium carbonate/vitamin D3 (CALCIUM+D ORAL) None Entered     • ezetimibe (ZETIA) 10 mg tablet Take 10 mg by mouth daily.       • fluticasone propionate (FLONASE) 50 mcg/actuation nasal spray Administer 1 spray into each nostril daily. 1 Bottle 0   • rosuvastatin (CRESTOR) 10 mg tablet once daily       No current facility-administered medications for this visit.           Review of Systems       Review of Systems   Constitutional: Positive for fatigue and unexpected weight change. Negative for chills and fever.        Night sweats   Respiratory: Negative for cough, shortness of breath and wheezing.    Cardiovascular: Negative for chest pain and palpitations.   Gastrointestinal: Negative for abdominal pain, blood in stool, diarrhea, nausea and vomiting.   Musculoskeletal: Positive for arthralgias and joint swelling.   Skin: Positive for rash.        Physical Examination       Objective     Vitals:    08/19/19 0852   BP: 118/68   Pulse: 95   Resp: 16   Temp: 36.7 °C (98.1 °F)   SpO2: 95%       Pulse Readings from Last 5 Encounters:   08/19/19 95   07/09/19 89   05/30/19 87   12/12/18 78       Wt Readings from Last 5 Encounters:   08/19/19 57.5 kg (126 lb 12.8 oz)   07/09/19 58.5 kg (129 lb)   12/12/18 61.7 kg (136 lb)       Ht Readings from Last 5 Encounters:   08/19/19 1.715 m (5' 7.5\")   07/09/19 1.715 m (5' 7.5\")       BP Readings from Last 5 Encounters:   08/19/19 118/68   07/09/19 122/62   05/30/19 122/70   12/12/18 108/72       Physical Exam   Constitutional: She is oriented to person, place, and time. She appears well-developed and well-nourished.   Musculoskeletal:   Swelling of ankle. No hand swelling   Neurological: She " is alert and oriented to person, place, and time.   Skin:        Psychiatric: She has a normal mood and affect.        Laboratory Results     Lab Results   Component Value Date    WBC 6.70 07/31/2019    WBC 7.39 06/21/2016    WBC 5.57 06/03/2015    HGB 12.4 07/31/2019    HGB 14.3 06/21/2016    HGB 13.5 06/03/2015    HCT 39.0 07/31/2019    HCT 43.2 06/21/2016    HCT 40.4 06/03/2015    MCV 98.5 (H) 07/31/2019    MCV 94.7 06/21/2016    MCV 95.5 06/03/2015     07/31/2019     06/21/2016     06/03/2015        Lab Results   Component Value Date    GLUCOSE 92 07/31/2019    GLUCOSE 113 (H) 06/21/2016    GLUCOSE 104 (H) 06/03/2015    CALCIUM 9.3 07/31/2019    CALCIUM 9.5 06/21/2016    CALCIUM 9.6 06/03/2015     07/31/2019     06/21/2016     06/03/2015    K 4.2 07/31/2019    K 4.8 06/21/2016    K 3.8 06/03/2015    CO2 27 07/31/2019    CO2 28 06/21/2016    CO2 27 06/03/2015     07/31/2019     06/21/2016     06/03/2015    BUN 10 07/31/2019    BUN 16 06/21/2016    BUN 14 06/03/2015    CREATININE 1.0 07/31/2019    CREATININE 1.0 06/21/2016    CREATININE 0.9 06/03/2015    ALKPHOS 62 07/31/2019    ALKPHOS 64 06/21/2016    ALKPHOS 62 06/03/2015    AST 19 07/31/2019    AST 30 06/21/2016    AST 26 06/03/2015    ALT 13 07/31/2019    ALT 26 06/21/2016    ALT 20 06/03/2015    BILITOT 0.8 07/31/2019    BILITOT 0.3 06/21/2016    BILITOT 0.5 06/03/2015    ALBUMIN 3.6 07/31/2019    ALBUMIN 4.3 06/21/2016    ALBUMIN 4.3 06/03/2015       Lab Results   Component Value Date    CHOL 207 (H) 06/21/2016    CHOL 196 12/24/2015    CHOL 184 06/03/2015     Lab Results   Component Value Date    HDL 71 06/21/2016    HDL 58 12/24/2015    HDL 62 06/03/2015     Lab Results   Component Value Date    LDLCALC 123 (H) 06/21/2016    LDLCALC 123 (H) 12/24/2015    LDLCALC 113 (H) 06/03/2015     Lab Results   Component Value Date    TRIG 66 06/21/2016    TRIG 73 12/24/2015    TRIG 45 06/03/2015       Lab  Results   Component Value Date    TSH 1.55 07/31/2019    TSH 1.84 12/12/2018       Lab Results   Component Value Date    HGBA1C 5.6 06/21/2016    HGBA1C 5.3 06/03/2015       Lab Results   Component Value Date    HEPCAB Nonreactive 12/12/2018       Immunization History   Administered Date(s) Administered   • Hepatitis A 01/15/2015, 06/06/2015   • Influenza Split High Dose Preservative Free IM 10/26/2015   • Influenza Vaccine Quadrivalent 3 Yr And Older 01/01/2009, 01/01/2010, 01/01/2011, 01/01/2012, 01/01/2013, 01/01/2014   • Influenza Vaccine Quadrivalent Preservative Free 6-35 Months 10/31/2016   • Influenza, Unspecified 11/20/2013, 11/01/2016, 10/01/2018   • Pneumococcal Conjugate 13-Valent 10/26/2015   • Pneumococcal Polysaccharide 10/12/2011   • Tdap 10/10/2010   • Zoster 12/09/2015         Health Maintenance Topics with due status: Overdue       Topic Date Due    Medicare Annual Wellness Visit 09/14/2015    Annual Falls Risk Screening 09/14/2015    Zoster Vaccine 02/03/2016    Pneumococcal (65 years and older) 10/26/2016    Influenza Vaccine 08/01/2019     Health Maintenance Topics with due status: Not Due       Topic Last Completion Date    DTaP, Tdap, and Td Vaccines 10/10/2010    Colonoscopy 11/08/2016    DEXA Scan 10/17/2018    Mammogram 04/23/2019     Health Maintenance Topics with due status: Completed / Addressed / Aged Out       Topic Last Completion Date    Pneumococcal 10/26/2015    Hepatitis C Screening 12/12/2018    Meningococcal ACWY Aged Out    Varicella Vaccines Aged Out    HIB Vaccines Aged Out    IPV Vaccines Aged Out    HPV Vaccines Aged Out        Assessment and Plan       Assessment/Plan     Problem List Items Addressed This Visit     Arthralgia    Current Assessment & Plan     Unclear cause at this time. Check blood work. Will call with results. If negative, will call Rheumatology to see if can expedite a visit since they are backlogged.         Relevant Orders    ESTRELLITA    Rheumatoid factor     Sedimentation rate    Vitamin B12    CBC and Differential    Comprehensive metabolic panel    C-reactive protein    Lyme EIA reflex WB    Protein Elect, Serum w/Interp    Protein Elect, Urine w/Interp    Uric acid      Other Visit Diagnoses     Vitamin B12 deficiency    -  Primary    Relevant Orders    Vitamin B12          Return if symptoms worsen or fail to improve.    Orders Placed This Encounter   Procedures   • ESTRELLITA     Standing Status:   Future     Standing Expiration Date:   8/19/2020   • Rheumatoid factor     Standing Status:   Future     Standing Expiration Date:   8/19/2020   • Sedimentation rate     Standing Status:   Future     Standing Expiration Date:   8/19/2020   • Vitamin B12     Standing Status:   Future     Standing Expiration Date:   8/19/2020   • CBC and Differential     Standing Status:   Future     Standing Expiration Date:   8/19/2020   • Comprehensive metabolic panel     Standing Status:   Future     Standing Expiration Date:   8/19/2020   • C-reactive protein     Standing Status:   Future     Standing Expiration Date:   8/19/2020   • Lyme EIA reflex WB     Standing Status:   Future     Standing Expiration Date:   8/19/2020   • Protein Elect, Serum w/Interp     Standing Status:   Future     Standing Expiration Date:   8/19/2020   • Protein Elect, Urine w/Interp     Standing Status:   Future     Standing Expiration Date:   8/19/2020   • Uric acid     Standing Status:   Future     Standing Expiration Date:   8/19/2020              Peter Solorzano MD    8/19/2019

## 2019-08-19 NOTE — PATIENT INSTRUCTIONS
Problem List Items Addressed This Visit     Arthralgia     Unclear cause at this time. Check blood work. Will call with results. If negative, will call Rheumatology to see if can expedite a visit since they are backlogged.         Relevant Orders    ESTRELLITA    Rheumatoid factor    Sedimentation rate    Vitamin B12    CBC and Differential    Comprehensive metabolic panel    C-reactive protein    Lyme EIA reflex WB    Protein Elect, Serum w/Interp    Protein Elect, Urine w/Interp      Other Visit Diagnoses     Vitamin B12 deficiency    -  Primary    Relevant Orders    Vitamin B12

## 2019-08-19 NOTE — ASSESSMENT & PLAN NOTE
Unclear cause at this time. Check blood work. Will call with results. If negative, will call Rheumatology to see if can expedite a visit since they are backlogged.

## 2019-08-20 LAB
ALBUMIN ?TM UR ELPH-MCNC: 10.39 MG/DL
ALBUMIN MFR UR ELPH: 35.8 %
ALBUMIN MFR UR ELPH: 47.6 % (ref 48–62)
ALBUMIN SERPL ELPH-MCNC: 3.1 G/DL (ref 3.2–4.5)
ALBUMIN/GLOB SERPL: 0.9 {RATIO} (ref 1.1–2.1)
ALPHA1 GLOB MFR SERPL ELPH: 4 % (ref 2.1–4.8)
ALPHA1 GLOB MFR UR ELPH: 6.6 %
ALPHA1 GLOB SERPL ELPH-MCNC: 0.26 G/DL (ref 0.15–0.32)
ALPHA1 GLOB UR ELPH-MCNC: 1.9 MG/DL
ALPHA2 GLOB MFR UR ELPH: 14.3 % (ref 9.7–16.2)
ALPHA2 GLOB MFR UR ELPH: 19.5 %
ALPHA2 GLOB SERPL ELPH-MCNC: 0.93 G/DL (ref 0.7–1.1)
ALPHA2 GLOB UR ELPH-MCNC: 5.65 MG/DL
ANA SER QL IA: NEGATIVE
B-GLOBULIN MFR UR ELPH: 18 %
B-GLOBULIN SERPL ELPH-MCNC: 0.94 G/DL (ref 0.73–1.3)
B-GLOBULIN UR ELPH-MCNC: 5.22 MG/DL
BETA1 GLOB MFR UR ELPH: 14.4 % (ref 10.8–17.9)
GAMMA GLOB MFR UR ELPH: 19.7 % (ref 9–23)
GAMMA GLOB MFR UR ELPH: 20.2 %
GAMMA GLOB SERPL ELPH-MCNC: 1.28 G/DL (ref 0.6–1.6)
GAMMA GLOB UR ELPH-MCNC: 5.84 MG/DL
PROT PATTERN SERPL ELPH-IMP: NORMAL
PROT PATTERN UR ELPH-IMP: NORMAL
PROT SERPL-MCNC: 6.5 G/DL (ref 6–8.2)
PROT UR-MCNC: 29 MG/DL
RHEUMATOID FACT SERPL-ACNC: <20 IU/ML

## 2019-08-21 ENCOUNTER — DOCUMENTATION (OUTPATIENT)
Dept: PRIMARY CARE | Facility: CLINIC | Age: 69
End: 2019-08-21

## 2019-08-21 LAB — B BURGDOR AB SER IA-ACNC: 0.23 RATIO

## 2019-08-22 NOTE — PROGRESS NOTES
Patient notified of blood test results.  All tests were normal.  No identifiable cause for her arthralgias.   will try to expedite a visit with rheumatology for the patient.

## 2019-08-29 ENCOUNTER — TELEPHONE (OUTPATIENT)
Dept: PRIMARY CARE | Facility: CLINIC | Age: 69
End: 2019-08-29

## 2019-08-29 NOTE — TELEPHONE ENCOUNTER
----- Message from Peter Solorzano MD sent at 8/29/2019 11:46 AM EDT -----  Regarding: FW: Non-Urgent Medical Question  Contact: [phone]  Can you call Jayesh Tracy's office today to see if they can get patient in for a visit ASAP.  Let me know.  ----- Message -----  From: Trudi Tovar MA  Sent: 8/29/2019  11:34 AM  To: Peter Solorzano MD  Subject: FW: Non-Urgent Medical Question                      ----- Message -----  From: Ashleigh Peng  Sent: 8/28/2019   2:58 PM  To: [facility]  Subject: Non-Urgent Medical Question                      Have you had any luck locating a rheumatologist that can take me in the near future?  Let me know. Thanks    · I called Dr Jayesh Tracy's office they are unavailable the office will reopen @ 115 pm today   · I didn't have time to call them back yesterday I informed Dr Solorzano this am he is aware I called the office this morning they don't open until 9 am dr solorzano aware   · I called Dr Tracy's office I spoke with Maria E she placed the patient on Dr Nena Allen's schedule 8 am September 10 th in [place] . I told Dr Solorzano before he went in with his 10 am appointment he said that it was okay .  · I faxed all 30 pages to the Kishor Sanderson I received the confirmation .  · I will call the patient to inform her momentarily   · I left a message on patient's cell & home number to speak with me today before 4 pm today so I can let her know what is going on with her Rheumatology Appointment in [place]. I told her that when she calls the office to press Prompt #4 & Ask to speak with the  to see if I am available if not they will put her to my voice mail & I will call her when I am available .   Non-Urgent Medical Question   Received: 3 days ago   Message Contents   Ashleigh SAGE [facility]   Phone Number: [phone]             Your office called yesterday about a rheumatologist. I was unable to return the call  until after business hours. I will call Tuesday am.  Or you can just message me with info. Thanks    Non-Urgent Medical Question     Ashleigh Solorzano MD 3 days ago         Your office called yesterday about a rheumatologist. I was unable to return the call until after business hours. I will call Tuesday am.  Or you can just message me with info. Thanks      Non-Urgent Medical Question   Received: Today   Message Contents   Ashleigh SAGE [facility] Clinical Support [facility]   Phone Number: [phone]             I got a message from Dr Allen’s office. I have an appointment Sept 10 Th @8am. Thank you!    Non-Urgent Medical Question     Ashleigh Solorzano MD 1 hour ago (12:04 PM)         I got a message from Dr Allen’s office. I have an appointment Sept 10 Th @8am. Thank you!     · I spoke with Patient's  Deepak I just wanted to thank the patient for the portal message I also left a message on patient's work number thank you and good luck .

## 2019-11-20 ENCOUNTER — TELEPHONE (OUTPATIENT)
Dept: PRIMARY CARE | Facility: CLINIC | Age: 69
End: 2019-11-20

## 2019-11-20 NOTE — TELEPHONE ENCOUNTER
----- Message from Peter Solorzano MD sent at 11/20/2019 12:21 PM EST -----  Regarding: FW: Non-Urgent Medical Question  Contact: [phone]  Please find a time for to be scheduled for an appointment. I will take her on as a patient.     Peter Solorzano MD   ----- Message -----  From: Trudi Tovar MA  Sent: 11/20/2019  12:18 PM EST  To: Peter Solorzano MD  Subject: FW: Non-Urgent Medical Question                      ----- Message -----  From: Ashleigh Peng  Sent: 11/20/2019  11:55 AM EST  To: [facility] Clinical [facility]  Subject: Non-Urgent Medical Question                      Dr. Solorzano  I called the office today to make an appt for a yearly physical. I wanted to make an appointment with you. I was told that you are not taking any new appointments. I’m extremely disappointed!  I originally saw Dr. Mejia for the 1st time. last Dec. It was a well visit, I wanted a new Dr closer to home.  Over the summer I was extremely ill and at first when I saw Dr Mejia he thought it was cellulitis. After the treatment for that didn’t fix   things, I felt that he just couldn’t be bothered figuring it out.   I saw you (Dr Mejia was on vacation). You were wonderful , you listened , ordered appropriate blood work and referred me to a rheumatologist.   Yes I had an auto immune response. Thankfully it seems to be a one and done type of illness. I have no intention of ever going back to Dr Mejia! He is mediocre at best. I am going on an extended cruise (90 days) in January. I just wanted to get checked out before I go, which is why I called the office today for a visit. Is there any way I can change  to you? Can you see me before I go away on January 15,2020. I feel fine. If you cannot take me, can you please refer me to a new practice, preferably in the [facility] system.  My  (a physician) and myself ( a nurse) were extremely disappointed in the care I received by Dr Mejia. I just pointed that out since we  know what we are talking about.  Thank you  Ashleigh Peng    · Jack Earl when you have a moment can you call and schedule the patient New Patient for Dr Solorzano Thanks

## 2019-11-20 NOTE — TELEPHONE ENCOUNTER
Kellie, I left a vm with the patient to call the office back to schedule the new patient appt with Dr Solorzano. Patient has a strange vm message system where it doesn't let you leave long messages before the message taker comes on over and over again asking you to leave a message. I left another message for the patient and this time I was able to leave a longer message for callback.

## 2019-11-26 ENCOUNTER — OFFICE VISIT (OUTPATIENT)
Dept: PRIMARY CARE | Facility: CLINIC | Age: 69
End: 2019-11-26
Payer: MEDICARE

## 2019-11-26 ENCOUNTER — TELEPHONE (OUTPATIENT)
Dept: PRIMARY CARE | Facility: CLINIC | Age: 69
End: 2019-11-26

## 2019-11-26 VITALS
TEMPERATURE: 97.8 F | HEIGHT: 68 IN | SYSTOLIC BLOOD PRESSURE: 100 MMHG | WEIGHT: 128.4 LBS | BODY MASS INDEX: 19.46 KG/M2 | DIASTOLIC BLOOD PRESSURE: 60 MMHG | OXYGEN SATURATION: 98 % | RESPIRATION RATE: 16 BRPM | HEART RATE: 83 BPM

## 2019-11-26 DIAGNOSIS — E78.2 MIXED HYPERLIPIDEMIA: Primary | ICD-10-CM

## 2019-11-26 DIAGNOSIS — C50.911 MALIGNANT NEOPLASM OF RIGHT FEMALE BREAST, UNSPECIFIED ESTROGEN RECEPTOR STATUS, UNSPECIFIED SITE OF BREAST (CMS/HCC): Chronic | ICD-10-CM

## 2019-11-26 DIAGNOSIS — R73.9 HYPERGLYCEMIA: ICD-10-CM

## 2019-11-26 PROBLEM — M25.50 ARTHRALGIA: Status: RESOLVED | Noted: 2019-08-19 | Resolved: 2019-11-26

## 2019-11-26 PROBLEM — R53.83 OTHER FATIGUE: Status: RESOLVED | Noted: 2019-07-30 | Resolved: 2019-11-26

## 2019-11-26 PROBLEM — L03.115 CELLULITIS OF RIGHT LOWER EXTREMITY: Status: RESOLVED | Noted: 2019-07-09 | Resolved: 2019-11-26

## 2019-11-26 PROCEDURE — G0009 ADMIN PNEUMOCOCCAL VACCINE: HCPCS | Performed by: FAMILY MEDICINE

## 2019-11-26 PROCEDURE — 90732 PPSV23 VACC 2 YRS+ SUBQ/IM: CPT | Performed by: FAMILY MEDICINE

## 2019-11-26 PROCEDURE — 99214 OFFICE O/P EST MOD 30 MIN: CPT | Mod: 25 | Performed by: FAMILY MEDICINE

## 2019-11-26 RX ORDER — LANOLIN ALCOHOL/MO/W.PET/CERES
1000 CREAM (GRAM) TOPICAL DAILY
COMMUNITY

## 2019-11-26 RX ORDER — OMEGA-3-ACID ETHYL ESTERS 1 G/1
1 CAPSULE, LIQUID FILLED ORAL DAILY
COMMUNITY
End: 2020-11-05

## 2019-11-26 RX ORDER — CHOLECALCIFEROL (VITAMIN D3) 50 MCG
2000 TABLET ORAL DAILY
COMMUNITY

## 2019-11-26 ASSESSMENT — ACTIVITIES OF DAILY LIVING (ADL)
PATIENT'S MEMORY ADEQUATE TO SAFELY COMPLETE DAILY ACTIVITIES?: YES
ADEQUATE_TO_COMPLETE_ADL: YES

## 2019-11-26 ASSESSMENT — ENCOUNTER SYMPTOMS
WEAKNESS: 0
FOCAL WEAKNESS: 0
ARTHRALGIAS: 0
UNEXPECTED WEIGHT CHANGE: 0
CHEST TIGHTNESS: 0
COUGH: 0
MYALGIAS: 0
HEADACHES: 0
CONFUSION: 0
SPEECH DIFFICULTY: 0
DIZZINESS: 0
LEG PAIN: 0
SHORTNESS OF BREATH: 0
PALPITATIONS: 0
WHEEZING: 0
LIGHT-HEADEDNESS: 0

## 2019-11-26 NOTE — ASSESSMENT & PLAN NOTE
Lipids controlled at this time.  Continue present medication.  No myalgias or arthralgias.  Check lipids.  Follow low fat diet.  Exercise for 2.5 hours per week.

## 2019-11-26 NOTE — TELEPHONE ENCOUNTER
· Patient forgot to tell you she also needs her Liver Enzymes checked as well she didn't tell you at the visit and would like a call back when added.

## 2019-11-26 NOTE — TELEPHONE ENCOUNTER
· I did the order I called the patient informed her that it is is her chart she said that she isn't doing the blood work until Monday .

## 2019-11-26 NOTE — PATIENT INSTRUCTIONS
Problem List Items Addressed This Visit     Hyperlipidemia - Primary (Chronic)     Lipids controlled at this time.  Continue present medication.  No myalgias or arthralgias.  Check lipids.  Follow low fat diet.  Exercise for 2.5 hours per week.           Relevant Medications    omega-3 acid ethyl esters (LOVAZA) 1 gram capsule    Other Relevant Orders    Lipid panel    Hemoglobin A1c    Malignant neoplasm of female breast (CMS/HCC) (Chronic)     Follow up with Dr. Jon Solorzano.         Hyperglycemia     Check A1C.         Relevant Orders    Lipid panel    Hemoglobin A1c

## 2019-12-09 ENCOUNTER — APPOINTMENT (OUTPATIENT)
Dept: LAB | Facility: CLINIC | Age: 69
End: 2019-12-09
Attending: FAMILY MEDICINE
Payer: MEDICARE

## 2019-12-09 ENCOUNTER — DOCUMENTATION (OUTPATIENT)
Dept: PRIMARY CARE | Facility: CLINIC | Age: 69
End: 2019-12-09

## 2019-12-09 DIAGNOSIS — E78.2 MIXED HYPERLIPIDEMIA: ICD-10-CM

## 2019-12-09 DIAGNOSIS — R73.9 HYPERGLYCEMIA: ICD-10-CM

## 2019-12-09 LAB
ALBUMIN SERPL-MCNC: 3.7 G/DL (ref 3.4–5)
ALP SERPL-CCNC: 53 IU/L (ref 35–126)
ALT SERPL-CCNC: 20 IU/L (ref 11–54)
AST SERPL-CCNC: 23 IU/L (ref 15–41)
BILIRUB DIRECT SERPL-MCNC: 0.1 MG/DL
BILIRUB SERPL-MCNC: 0.5 MG/DL (ref 0.3–1.2)
CHOLEST SERPL-MCNC: 175 MG/DL
EST. AVERAGE GLUCOSE BLD GHB EST-MCNC: 120 MG/DL
HBA1C MFR BLD HPLC: 5.8 %
HDLC SERPL-MCNC: 58 MG/DL
HDLC SERPL: 3 {RATIO}
LDLC SERPL CALC-MCNC: 106 MG/DL
NONHDLC SERPL-MCNC: 117 MG/DL
PROT SERPL-MCNC: 6.5 G/DL (ref 6–8.2)
TRIGL SERPL-MCNC: 57 MG/DL (ref 30–149)

## 2019-12-09 PROCEDURE — 83036 HEMOGLOBIN GLYCOSYLATED A1C: CPT

## 2019-12-09 PROCEDURE — 80061 LIPID PANEL: CPT

## 2019-12-09 PROCEDURE — 36415 COLL VENOUS BLD VENIPUNCTURE: CPT

## 2019-12-09 PROCEDURE — 80076 HEPATIC FUNCTION PANEL: CPT

## 2019-12-09 NOTE — PROGRESS NOTES
Patient notified of blood test results.  Patient with impaired fasting glucose.  Will recheck A1c in 1 year.   patient advised to follow a low carbohydrate diet and low concentrated sweet diet.  Advised to exercise 2.5 hours per week.  Requires periodic monitoring of A1C.  Lipids are at goal.  Continue present medication.  Normal liver function test.  No other new recommendations advised at this time.

## 2019-12-11 PROBLEM — I51.89 DIASTOLIC DYSFUNCTION: Chronic | Status: ACTIVE | Noted: 2019-12-11

## 2020-07-08 ENCOUNTER — OFFICE VISIT (OUTPATIENT)
Dept: PRIMARY CARE | Facility: CLINIC | Age: 70
End: 2020-07-08
Payer: MEDICARE

## 2020-07-08 VITALS
BODY MASS INDEX: 20.64 KG/M2 | OXYGEN SATURATION: 96 % | RESPIRATION RATE: 16 BRPM | HEIGHT: 68 IN | TEMPERATURE: 100.5 F | SYSTOLIC BLOOD PRESSURE: 118 MMHG | WEIGHT: 136.2 LBS | DIASTOLIC BLOOD PRESSURE: 80 MMHG | HEART RATE: 93 BPM

## 2020-07-08 DIAGNOSIS — R73.9 HYPERGLYCEMIA: ICD-10-CM

## 2020-07-08 DIAGNOSIS — E78.2 MIXED HYPERLIPIDEMIA: Chronic | ICD-10-CM

## 2020-07-08 DIAGNOSIS — C50.911 MALIGNANT NEOPLASM OF RIGHT FEMALE BREAST, UNSPECIFIED ESTROGEN RECEPTOR STATUS, UNSPECIFIED SITE OF BREAST (CMS/HCC): Chronic | ICD-10-CM

## 2020-07-08 DIAGNOSIS — Z00.00 MEDICARE ANNUAL WELLNESS VISIT, INITIAL: Primary | ICD-10-CM

## 2020-07-08 PROCEDURE — G0438 PPPS, INITIAL VISIT: HCPCS | Performed by: FAMILY MEDICINE

## 2020-07-08 RX ORDER — ROSUVASTATIN CALCIUM 20 MG/1
20 TABLET, COATED ORAL DAILY
COMMUNITY
Start: 2020-07-08 | End: 2021-07-21 | Stop reason: SDUPTHER

## 2020-07-08 RX ORDER — PNV NO.95/FERROUS FUM/FOLIC AC 28MG-0.8MG
1 TABLET ORAL 2 TIMES DAILY
COMMUNITY
End: 2021-10-13 | Stop reason: ALTCHOICE

## 2020-07-08 ASSESSMENT — ENCOUNTER SYMPTOMS
EYE PAIN: 0
DYSPHORIC MOOD: 0
FREQUENCY: 0
APPETITE CHANGE: 0
NERVOUS/ANXIOUS: 0
ABDOMINAL PAIN: 0
NAUSEA: 0
HEMATURIA: 0
BLOOD IN STOOL: 0
CHEST TIGHTNESS: 0
UNEXPECTED WEIGHT CHANGE: 0
LIGHT-HEADEDNESS: 0
NECK PAIN: 0
SINUS PRESSURE: 0
MYALGIAS: 0
TROUBLE SWALLOWING: 0
RHINORRHEA: 0
COUGH: 0
SORE THROAT: 0
SPEECH DIFFICULTY: 0
DIARRHEA: 0
HEADACHES: 0
DIFFICULTY URINATING: 0
CHILLS: 0
VOMITING: 0
SLEEP DISTURBANCE: 0
FATIGUE: 0
DIZZINESS: 0
NUMBNESS: 0
WHEEZING: 0
CONSTIPATION: 0
WEAKNESS: 0
BRUISES/BLEEDS EASILY: 0
EYE ITCHING: 0
FEVER: 0
EYE REDNESS: 0
ARTHRALGIAS: 0
BACK PAIN: 0
JOINT SWELLING: 0
SHORTNESS OF BREATH: 0
EYE DISCHARGE: 0
PALPITATIONS: 0
DYSURIA: 0
CONFUSION: 0

## 2020-07-08 ASSESSMENT — MINI COG
COMPLETED: YES
TOTAL SCORE: 5

## 2020-07-08 NOTE — PATIENT INSTRUCTIONS
Women's Personalized Prevention Plan Services (PPPS)       Preventive Services Checklist (Assumes Average Risk Unless Otherwise Noted)  Preventive Service Target Population and Frequency Last Done Date Due   Abd Aortic Aneurysm Screening Family history of AAA (covered once)  Not needed    Alcohol Misuse Screening As necessary for those at risk (covered annually) 07/08/2020 07/2021   Breast Cancer Screening Mammogram every 1-2yrs 50-71yo; every 1-2yrs 35-48yo if patient desires after weighing potential harms and benefits (covered once 35-38yo, annually >=39yo) 04/23/2019 Ordered    Cholesterol Screening Both risk factors: 1) >=21yo and 2)  increased risk coronary artery disease (covered every 5 years) 12/09/2019 Ordered    Colorectal Cancer Screening >=51yo: Colonoscopy every 10 years, FIT annually or Cologuard every 3 years (up to 84yo for Cologuard) 11/08/2016 11/2026   Diabetes Screening Any 1 risk factor: hypertension, dyslipidemia, obesity, high glucose; or Any 2 risk factors: >=66 yo, overweight, family history diabetes, history gestational diabetes or delivery of infant >9lbs (covered every 6 months) 08/19/19 Ordered    Glaucoma Screening Any 1 risk factor: 1) diabetes, 2) family history glaucoma, 3)  >=51yo, 4)  American >=64yo (covered annually) 2019 2020   Hepatitis C Screening Any 1 risk factor: 1) born between 7525-7651, 2) blood transfusion before 1992, 3) current or past injection drug use (covered once for average risk, annually for high risk) 12/12/2018 Done    Lung Cancer Screening Low-dose chest CT if all 3 risk factors: 1) 55-76yo, 2) smoker or quit within last 15y, 3) >=30 pack years (covered annually)  Not needed    Osteoporosis Screening >=64yo (covered every 24 months)  <64yo if any 1 risk factor: 1) estrogen deficient and at risk for osteoporosis; 2) established osteoporosis on treatment; 3) x-rays show possible osteoporosis, osteopenia or vertebral fractures; 4) on  "steroid or planning to start; 5) primary hyperparathyroidism (covered as medically necessary) 10/17/2018 10/2020   Sexually Transmitted Diseases (STDs) As necessary chlamydia, gonorrhea, syphilis, hepatitis B (covered annually if not pregnant, more often in pregnancy)  HIV if any 1 risk factor present: 1) <14yo or >66yo and at increased risk, 2) 15-66yo and ask for it, or 3) pregnant (covered annually if not pregnant, up to 3x in pregnancy)  Low Risk    Vaccine: Hepatitis B As necessary if medium or high risk (series covered once)   Not needed     Vaccine: Influenza All patients without contraindications (covered annually.) 10/01/2019 Fall 2020   Vaccine: Pneumococcal Pneumovax + Prevnar (both covered, >=11 months apart) Prevnar   10/26/2015  Pneumovax  11/26/2019   Done    Vaccine: Shingrix (Shingles) >= 49yo without contraindications             (2 doses, 2 months apart) Zostavax  12/09/2015   Should get Shingrix at pharmacy     TDAP Every 10 years   10/10/201 Should get TDAP at pharmacy.                                      Women's Personalized Prevention Plan Services (PPPS)                                                          Prints to S                                          Health Risk Factors and Interventions  \"x\" Indicates risk is associated with this patient Risk Factor Recommended Interventions    N/A  Obesity     N/A  Hypertension    N/A  Diabetes    N/A  Fall Risk    N/A  Tobacco Use               Problem List Items Addressed This Visit     Hyperlipidemia (Chronic)    Relevant Medications    rosuvastatin (CRESTOR) 20 mg tablet    Other Relevant Orders    CBC and Differential    Comprehensive metabolic panel    Hemoglobin A1c    Lipid panel    TSH 3rd Generation    Malignant neoplasm of female breast (CMS/HCC) (Chronic)     stable         Hyperglycemia     Check A1C         Relevant Orders    CBC and Differential    Comprehensive metabolic panel    Hemoglobin A1c    Lipid panel    TSH 3rd " Generation    Medicare annual wellness visit, initial - Primary     · The patient is currently stable.   · Bloodwork was ordered including a complete blood count, complete metabolic profile, thyroid stimulating hormone, A1C and a lipid profile.   · Further recommendations will be provided to the patient based on the results of the blood tests.   · The patient should continue to exercise for 2.5 hours per week.   · All the recommend vaccinations are up to date.   · The patient should be evaluated by the ophthalmologist every year  and dentist every 6 months.   · The patient was advised to protect the skin by applying suntan lotion containing an SPF > 30 every 2 hours while in sun or after swimming. Instructed to avoid prolonged direct sun exposure as much as possible.   · The patient's body mass index is normal.   · The patient's gynecological examination and mammogram are up to date.   · The patient's DEXA scan is up to date.   · The patient's colonoscopy is up to date.   · Advised to consume 1000 mg of calcium daily through the diet. If the patient is unable to consume 1000 mg through the diet, then taking calcium supplements is an acceptable alternative. The patient was informed not to consume more than 500 mg of a calcium supplement at a time.                        Health Risk Factors with Personalized Education:  ----------------------------------------------------------------------------------------------------------------------  Controlling Your Cholesterol  · Reduce the amount of saturated and trans fat in your diet.  Limit intake of red meat.  Consume only low-fat or non-fat/skim dairy.  Limit fried food.  Cook with vegetable oils.  · Reduce your intake of sugary foods, sugary drinks and alcohol.  · Eat a diet high in fruit, vegetables and whole grains.  · Get protein from fish, poultry and a small portion of nuts.  · Stay active.  Try to get at least 90 to 150 minutes of exercise per week.  Try brisk  walking, swimming, bicycling or dancing.  · Maintain a healthy weight by balancing your diet and exercise.  · If you have been prescribed medication, take it regularly and exactly as prescribed. Let your PCP know if you have any problems or questions about your medication.  · It’s important to know your cholesterol numbers.  When recommended by your PCP, get the cholesterol blood test.  ----------------------------------------------------------------------------------------------------------------------  Maintaining Strong Bones  · Try to get at least 90 to 150 minutes of weight-bearing exercise per week.  · Ensure intake of at least 1200mg of calcium per day.  Eat foods high in calcium like milk and other dairy, green vegetables, fruit, canned fish with soft and edible bones, nuts, calcium-set tofu.  Some foods are calcium-fortified, like bread, cereal, fruit juices and mineral water.  · Help your body make vitamin D by getting 10-15 minutes per day of sunlight.    · Ensure intake of at least 600IU of vitamin D per day.  Eat foods high in vitamin D like oily fish (salmon, sardines, mackerel) and eggs.  Some foods are fortified with vitamin D, like dairy and cereals.  · Avoid high amounts of caffeine and salt, since they can cause the body to loose calcium.  · Limit alcohol intake, since it is associated with weaker bones and is associated with falls and fractures.  · Limit intake of fizzy drinks.  ----------------------------------------------------------------------------------------------------------------------  Reducing Your Risk of Falls  · Tell your PCP if any of your medications make you feel tired, dizzy, lightheaded or off-balance.  · Maintain coordination, flexibility and balance by ensuring regular physical activity.  · Limit alcohol intake to 1 drink per day.  Consider avoiding all alcohol intake.  · Ensure good vision.  Visit an ophthalmologist or optometrist regularly for vision screening or to make  sure your glasses / contact lens prescription is correct.  If you need glasses or contacts, wear them.  When you get new glasses or contacts, take time to get used to them.  Do not wear sunglasses or tinted lenses when indoors.  · Ensure good hearing.  Have your hearing checked if you are having trouble hearing, or family and friends think you cannot hear them.  If you need a hearing aid, be sure it fits well and wear it.  · Get enough rest.  Ensure about 7-9 hours of sleep every day.  · Get up slowly from your bed or chairs.  Do not start walking until you are sure you feel steady.  · Wear non-skid, rubber-soled, low-heeled shoes.  Do not walk in socks, or in shoes and slippers with smooth soles.  · If your PCP or therapist recommends using a cane or walker, use it regularly.  · Make your home safer.  Increase lighting throughout the house, especially at the top and bottom of stairs.  Ensure lighting is easily turned on when getting up in the middle of the night.  Make sure there are two secure rails on all stairs.  Install grab bars in the bathtub / shower and near the toilet.  Consider using a shower chair and / or a hand-held shower.  · Spread sand or salt on icy surfaces.  Beware of wet surfaces, which can be icy.  · Tell your PCP if you have fallen.    ASK DR. MOHAN TO FORWARD ECHOCARDIOGRAM  Patient Education     Health Maintenance After Age 65  After age 65, you are at a higher risk for certain long-term diseases and infections as well as injuries from falls. Falls are a major cause of broken bones and head injuries in people who are older than age 65. Getting regular preventive care can help to keep you healthy and well. Preventive care includes getting regular testing and making lifestyle changes as recommended by your health care provider. Talk with your health care provider about:  · Which screenings and tests you should have. A screening is a test that checks for a disease when you have no  symptoms.  · A diet and exercise plan that is right for you.  What should I know about screenings and tests to prevent falls?  Screening and testing are the best ways to find a health problem early. Early diagnosis and treatment give you the best chance of managing medical conditions that are common after age 65. Certain conditions and lifestyle choices may make you more likely to have a fall. Your health care provider may recommend:  · Regular vision checks. Poor vision and conditions such as cataracts can make you more likely to have a fall. If you wear glasses, make sure to get your prescription updated if your vision changes.  · Medicine review. Work with your health care provider to regularly review all of the medicines you are taking, including over-the-counter medicines. Ask your health care provider about any side effects that may make you more likely to have a fall. Tell your health care provider if any medicines that you take make you feel dizzy or sleepy.  · Osteoporosis screening. Osteoporosis is a condition that causes the bones to get weaker. This can make the bones weak and cause them to break more easily.  · Blood pressure screening. Blood pressure changes and medicines to control blood pressure can make you feel dizzy.  · Strength and balance checks. Your health care provider may recommend certain tests to check your strength and balance while standing, walking, or changing positions.  · Foot health exam. Foot pain and numbness, as well as not wearing proper footwear, can make you more likely to have a fall.  · Depression screening. You may be more likely to have a fall if you have a fear of falling, feel emotionally low, or feel unable to do activities that you used to do.  · Alcohol use screening. Using too much alcohol can affect your balance and may make you more likely to have a fall.  What actions can I take to lower my risk of falls?  General instructions  · Talk with your health care  provider about your risks for falling. Tell your health care provider if:  ? You fall. Be sure to tell your health care provider about all falls, even ones that seem minor.  ? You feel dizzy, sleepy, or off-balance.  · Take over-the-counter and prescription medicines only as told by your health care provider. These include any supplements.  · Eat a healthy diet and maintain a healthy weight. A healthy diet includes low-fat dairy products, low-fat (lean) meats, and fiber from whole grains, beans, and lots of fruits and vegetables.  Home safety  · Remove any tripping hazards, such as rugs, cords, and clutter.  · Install safety equipment such as grab bars in bathrooms and safety rails on stairs.  · Keep rooms and walkways well-lit.  Activity    · Follow a regular exercise program to stay fit. This will help you maintain your balance. Ask your health care provider what types of exercise are appropriate for you.  · If you need a cane or walker, use it as recommended by your health care provider.  · Wear supportive shoes that have nonskid soles.  Lifestyle  · Do not drink alcohol if your health care provider tells you not to drink.  · If you drink alcohol, limit how much you have:  ? 0-1 drink a day for women.  ? 0-2 drinks a day for men.  · Be aware of how much alcohol is in your drink. In the [place] one drink equals one typical bottle of beer (12 oz), one-half glass of wine (5 oz), or one shot of hard liquor (1½ oz).  · Do not use any products that contain nicotine or tobacco, such as cigarettes and e-cigarettes. If you need help quitting, ask your health care provider.  Summary  · Having a healthy lifestyle and getting preventive care can help to protect your health and wellness after age 65.  · Screening and testing are the best way to find a health problem early and help you avoid having a fall. Early diagnosis and treatment give you the best chance for managing medical conditions that are more common for people who  are older than age 65.  · Falls are a major cause of broken bones and head injuries in people who are older than age 65. Take precautions to prevent a fall at home.  · Work with your health care provider to learn what changes you can make to improve your health and wellness and to prevent falls.  This information is not intended to replace advice given to you by your health care provider. Make sure you discuss any questions you have with your health care provider.  Document Released: 10/31/2018 Document Revised: 10/31/2018 Document Reviewed: 10/31/2018  [employer] Patient Education © 2019 [employer]

## 2020-07-08 NOTE — PROGRESS NOTES
Subjective     Ashleigh Peng is a 69 y.o. female who presents for an initial annual wellness visit.     Patient Care Team:  Peter Solorzano MD as PCP - General (Family Medicine)  Pepe Jasmine MD as Consulting Physician (Obstetrics and Gynecology)  Kun Guzman MD as Consulting Physician (Cardiology)  Donis Patten MD as Consulting Physician (Ophthalmology)  Jon Solorzano MD as Consulting Physician (Oncology)  Matt Bacon MD as Consulting Physician (Dermatology)    Comprehensive Medical and Social History  Patient Active Problem List   Diagnosis   • Hyperlipidemia   • Malignant neoplasm of female breast (CMS/HCC)   • Hyperglycemia   • Medicare annual wellness visit, initial   • Diastolic dysfunction     Past Medical History:   Diagnosis Date   • Diastolic dysfunction 12/11/2019    Seen on echocardiogram in 12/2019. Has EF of 60%, mild mitral regurgitation and mild tricuspid regurgitation.  Has minimally elevated transaortic gradient.   • Hyperglycemia 12/12/2018   • Hyperlipidemia 10/26/2015    Cardiologist: Dr. Guzamn. Managed with Rosuvastatin, Ezetimibe, and Lovaza.  Requires periodic monitoring of lipids and liver function tests.  Advised to follow low fat diet and to keep BMI < 25.  Advised to exercise for 2.5 hours per week.     • Malignant neoplasm of female breast (CMS/HCC) 3/1/2007    Oncologist: Dr. Jon Solorzano Surgeon: Dr. Bragg Right lumpectomy. Dx  BREAST CANCER, Dx Date 3/2007, Histology  , Stage @ Dx  T1,N0, ER/WV+ HERII+, Disease Status  SIN Treatment was radiation and Arimidex x 5 years Managed with yearly mammograms.     Past Surgical History:   Procedure Laterality Date   • BREAST LUMPECTOMY Right 2007    Dr. Bragg   • WISDOM TOOTH EXTRACTION       Allergies   Allergen Reactions   • No Known Allergies      Current Outpatient Medications   Medication Sig Dispense Refill   • aspirin 81 mg enteric coated tablet Take 81 mg by mouth daily.      • calcium carbonate-vitamin D3 (CALCIUM  500 + D) 500 mg(1,250mg) -400 unit tablet Take 1 tablet by mouth 2 (two) times a day.     • cholecalciferol, vitamin D3, 2,000 unit tablet Take 2,000 Units by mouth daily.     • cyanocobalamin (VITAMIN B12) 1,000 mcg tablet Take 1,000 mcg by mouth daily.     • ezetimibe (ZETIA) 10 mg tablet Take 10 mg by mouth daily.      • multivitamin-iron-folic acid  mg-mcg tablet Take 1 tablet by mouth daily.     • omega-3 acid ethyl esters (LOVAZA) 1 gram capsule Take 1 g by mouth daily.      • rosuvastatin (CRESTOR) 20 mg tablet Take 1 tablet (20 mg total) by mouth daily.     • ubidecarenone (COENZYME Q10 ORAL) Take 1 tablet by mouth daily.       No current facility-administered medications for this visit.      Social History     Tobacco Use   • Smoking status: Current Every Day Smoker     Packs/day: 0.50     Years: 50.00     Pack years: 25.00     Types: Cigarettes   • Smokeless tobacco: Current User   Substance Use Topics   • Alcohol use: Yes     Alcohol/week: 2.0 - 5.0 standard drinks     Types: 2 - 5 Standard drinks or equivalent per week   • Drug use: Never     Family History   Problem Relation Age of Onset   • Kidney disease Biological Mother    • Heart disease Biological Mother    • Hyperlipidemia Biological Mother    • COPD Biological Father    • No Known Problems Biological Sister    • Hyperlipidemia Biological Brother    • Throat cancer Biological Brother    • Brain cancer Paternal Grandmother    • Lung cancer Paternal Grandfather    • No Known Problems Biological Brother      Review of Systems   Constitutional: Negative for appetite change, chills, fatigue, fever and unexpected weight change.   HENT: Negative for congestion, ear pain, hearing loss, nosebleeds, postnasal drip, rhinorrhea, sinus pressure, sneezing, sore throat and trouble swallowing.    Eyes: Negative for pain, discharge, redness, itching and visual disturbance.   Respiratory: Negative for cough, chest tightness, shortness of breath and wheezing.  "   Cardiovascular: Negative for chest pain, palpitations and leg swelling.   Gastrointestinal: Negative for abdominal pain, blood in stool, constipation, diarrhea, nausea and vomiting.   Endocrine: Negative for cold intolerance and heat intolerance.   Genitourinary: Negative for difficulty urinating, dysuria, frequency, hematuria, urgency, vaginal bleeding and vaginal discharge.   Musculoskeletal: Negative for arthralgias, back pain, gait problem, joint swelling, myalgias and neck pain.   Skin: Negative for rash.   Allergic/Immunologic: Negative for environmental allergies.   Neurological: Negative for dizziness, syncope, speech difficulty, weakness, light-headedness, numbness and headaches.   Hematological: Does not bruise/bleed easily.   Psychiatric/Behavioral: Negative for confusion, dysphoric mood and sleep disturbance. The patient is not nervous/anxious.            Objective   Vitals  Vitals:    07/08/20 1416   BP: 118/80   BP Location: Left upper arm   Patient Position: Sitting   Pulse: 93   Resp: 16   Temp: (!) 38.1 °C (100.5 °F)  Comment: hair salon 97.5 today 2nd check 99.5 3rd check 99.0   TempSrc: Oral   SpO2: 96%   Weight: 61.8 kg (136 lb 3.2 oz)  Comment: with shoes   Height: 1.727 m (5' 8\")  Comment: with shoes     Body mass index is 20.71 kg/m².      Physical Exam   Constitutional: She appears well-developed and well-nourished.   HENT:   Head: Normocephalic and atraumatic.   Right Ear: Tympanic membrane, external ear and ear canal normal.   Left Ear: Tympanic membrane, external ear and ear canal normal.   Nose: No mucosal edema or rhinorrhea. Right sinus exhibits no maxillary sinus tenderness and no frontal sinus tenderness. Left sinus exhibits no maxillary sinus tenderness and no frontal sinus tenderness.   Mouth/Throat: Uvula is midline, oropharynx is clear and moist and mucous membranes are normal. No oral lesions. No oropharyngeal exudate, posterior oropharyngeal edema or posterior oropharyngeal " erythema. No tonsillar exudate.   Eyes: Pupils are equal, round, and reactive to light. EOM and lids are normal. Right eye exhibits no discharge and no exudate. Left eye exhibits no discharge and no exudate. Right conjunctiva is not injected. Left conjunctiva is not injected. No scleral icterus.   Neck: Normal range of motion. Neck supple. Carotid bruit is not present. No thyroid mass and no thyromegaly present.   Cardiovascular: Normal rate, regular rhythm and normal heart sounds. Exam reveals no gallop, no S3 and no S4.   No murmur heard.  Pulses:       Popliteal pulses are 2+ on the right side, and 2+ on the left side.        Dorsalis pedis pulses are 2+ on the right side, and 2+ on the left side.   Pulmonary/Chest: Effort normal and breath sounds normal. No respiratory distress. She has no decreased breath sounds. She has no wheezes. She has no rhonchi. She has no rales. She exhibits no tenderness.   Abdominal: Soft. Normal appearance and bowel sounds are normal. She exhibits no distension. There is no hepatosplenomegaly. There is no tenderness. There is no rebound and no guarding. No hernia.   Musculoskeletal: She exhibits no edema.   Lymphadenopathy:     She has no cervical adenopathy.   Neurological: She is alert. She has normal strength.   Skin: Skin is warm and dry. No rash noted.   Psychiatric: She has a normal mood and affect. Her speech is normal and behavior is normal. Judgment and thought content normal. Cognition and memory are normal.         Advanced Care Plan                                        PHQ                                                 Mini Cog  Completed: Yes  Score: 5  Result: Negative    Get Up and Go  Result: Pass      STEADI Falls Risk   No falls                                                             Hearing and Vision Screening  No exam data present  See HRA for relevant hearing screening response.      Immunization History   Administered Date(s) Administered   • Hepatitis A  01/15/2015, 06/06/2015   • Influenza Split High Dose Preservative Free IM 10/26/2015   • Influenza Vaccine High Dose 65 And Older 10/01/2019   • Influenza Vaccine Quadrivalent 3 Yr And Older 01/01/2009, 01/01/2010, 01/01/2011, 01/01/2012, 01/01/2013, 01/01/2014   • Influenza Vaccine Quadrivalent Preservative Free 6-35 Months 10/31/2016   • Influenza, Unspecified 11/20/2013, 11/01/2016, 10/01/2018   • Pneumococcal Conjugate 13-Valent 10/26/2015   • Pneumococcal Polysaccharide 10/12/2011, 11/26/2019   • Tdap 10/10/2010   • Zoster 12/09/2015       Health Maintenance Topics with due status: Overdue       Topic Date Due    Zoster Vaccine 02/03/2016    Mammogram 04/23/2020     Health Maintenance Topics with due status: Not Due       Topic Last Completion Date    DTaP, Tdap, and Td Vaccines 10/10/2010    Colonoscopy 11/08/2016    DEXA Scan 10/17/2018    Influenza Vaccine 10/01/2019    Annual Falls Risk Screening 11/26/2019     Health Maintenance Topics with due status: Completed       Topic Last Completion Date    Hepatitis C Screening 12/12/2018    Pneumococcal (65 years and older) 11/26/2019     Health Maintenance Topics with due status: Aged Out       Topic Date Due    Meningococcal ACWY Aged Out    HIB Vaccines Aged Out    IPV Vaccines Aged Out    HPV Vaccines Aged Out    Pneumococcal Aged Out     Health Maintenance Topics with due status: Discontinued       Topic Date Due    Varicella Vaccines Discontinued       Lab Results   Component Value Date    WBC 5.77 08/19/2019    WBC 6.70 07/31/2019    WBC 7.39 06/21/2016    HGB 12.5 08/19/2019    HGB 12.4 07/31/2019    HGB 14.3 06/21/2016    HCT 38.9 08/19/2019    HCT 39.0 07/31/2019    HCT 43.2 06/21/2016    MCV 96.8 08/19/2019    MCV 98.5 (H) 07/31/2019    MCV 94.7 06/21/2016     08/19/2019     07/31/2019     06/21/2016       Lab Results   Component Value Date    GLUCOSE 111 (H) 08/19/2019    GLUCOSE 92 07/31/2019    GLUCOSE 113 (H) 06/21/2016     CALCIUM 9.4 08/19/2019    CALCIUM 9.3 07/31/2019    CALCIUM 9.5 06/21/2016     08/19/2019     07/31/2019     06/21/2016    K 4.3 08/19/2019    K 4.2 07/31/2019    K 4.8 06/21/2016    CO2 27 08/19/2019    CO2 27 07/31/2019    CO2 28 06/21/2016     08/19/2019     07/31/2019     06/21/2016    BUN 9 08/19/2019    BUN 10 07/31/2019    BUN 16 06/21/2016    CREATININE 0.9 08/19/2019    CREATININE 1.0 07/31/2019    CREATININE 1.0 06/21/2016       Lab Results   Component Value Date    ALT 20 12/09/2019    ALT 13 08/19/2019    ALT 13 07/31/2019    AST 23 12/09/2019    AST 20 08/19/2019    AST 19 07/31/2019    GGT 10 06/03/2015    ALKPHOS 53 12/09/2019    ALKPHOS 64 08/19/2019    ALKPHOS 62 07/31/2019    BILITOT 0.5 12/09/2019    BILITOT 0.5 08/19/2019    BILITOT 0.8 07/31/2019       Lab Results   Component Value Date    CHOL 175 12/09/2019    CHOL 207 (H) 06/21/2016    CHOL 196 12/24/2015     Lab Results   Component Value Date    HDL 58 12/09/2019    HDL 71 06/21/2016    HDL 58 12/24/2015     Lab Results   Component Value Date    LDLCALC 106 (H) 12/09/2019    LDLCALC 123 (H) 06/21/2016    LDLCALC 123 (H) 12/24/2015     Lab Results   Component Value Date    TRIG 57 12/09/2019    TRIG 66 06/21/2016    TRIG 73 12/24/2015         Lab Results   Component Value Date    TSH 1.55 07/31/2019    TSH 1.84 12/12/2018       Lab Results   Component Value Date    HEPCAB Nonreactive 12/12/2018         Assessment/Plan   Diagnoses and all orders for this visit:    Medicare annual wellness visit, initial (Primary)  Assessment & Plan:  · The patient is currently stable.   · Bloodwork was ordered including a complete blood count, complete metabolic profile, thyroid stimulating hormone, A1C and a lipid profile.   · Further recommendations will be provided to the patient based on the results of the blood tests.   · The patient should continue to exercise for 2.5 hours per week.   · All the recommend vaccinations  are up to date.   · The patient should be evaluated by the ophthalmologist every year  and dentist every 6 months.   · The patient was advised to protect the skin by applying suntan lotion containing an SPF > 30 every 2 hours while in sun or after swimming. Instructed to avoid prolonged direct sun exposure as much as possible.   · The patient's body mass index is normal.   · The patient's gynecological examination and mammogram are up to date.   · The patient's DEXA scan is up to date.   · The patient's colonoscopy is up to date.   · Advised to consume 1000 mg of calcium daily through the diet. If the patient is unable to consume 1000 mg through the diet, then taking calcium supplements is an acceptable alternative. The patient was informed not to consume more than 500 mg of a calcium supplement at a time.         Mixed hyperlipidemia  -     CBC and Differential; Future  -     Comprehensive metabolic panel; Future  -     Hemoglobin A1c; Future  -     Lipid panel; Future  -     TSH 3rd Generation; Future    Malignant neoplasm of right female breast, unspecified estrogen receptor status, unspecified site of breast (CMS/HCC)  Assessment & Plan:  stable      Hyperglycemia  Assessment & Plan:  Check A1C    Orders:  -     CBC and Differential; Future  -     Comprehensive metabolic panel; Future  -     Hemoglobin A1c; Future  -     Lipid panel; Future  -     TSH 3rd Generation; Future      See Patient Instructions (the written plan) which was given to the patient for PPPS and health risk factors with interventions.

## 2020-07-08 NOTE — ASSESSMENT & PLAN NOTE
· The patient is currently stable.   · Bloodwork was ordered including a complete blood count, complete metabolic profile, thyroid stimulating hormone, A1C and a lipid profile.   · Further recommendations will be provided to the patient based on the results of the blood tests.   · The patient should continue to exercise for 2.5 hours per week.   · All the recommend vaccinations are up to date.   · The patient should be evaluated by the ophthalmologist every year  and dentist every 6 months.   · The patient was advised to protect the skin by applying suntan lotion containing an SPF > 30 every 2 hours while in sun or after swimming. Instructed to avoid prolonged direct sun exposure as much as possible.   · The patient's body mass index is normal.   · The patient's gynecological examination and mammogram are up to date.   · The patient's DEXA scan is up to date.   · The patient's colonoscopy is up to date.   · Advised to consume 1000 mg of calcium daily through the diet. If the patient is unable to consume 1000 mg through the diet, then taking calcium supplements is an acceptable alternative. The patient was informed not to consume more than 500 mg of a calcium supplement at a time.

## 2020-07-09 ENCOUNTER — CLINICAL SUPPORT (OUTPATIENT)
Dept: OTHER | Facility: CLINIC | Age: 70
End: 2020-07-09
Payer: MEDICARE

## 2020-07-09 ENCOUNTER — TELEPHONE (OUTPATIENT)
Dept: INFECTIOUS DISEASES | Facility: HOSPITAL | Age: 70
End: 2020-07-09

## 2020-07-09 DIAGNOSIS — R50.9 FEVER, UNSPECIFIED FEVER CAUSE: Primary | ICD-10-CM

## 2020-07-09 DIAGNOSIS — R50.9 FEVER, UNSPECIFIED FEVER CAUSE: ICD-10-CM

## 2020-07-09 NOTE — TELEPHONE ENCOUNTER
Please schedule this patient for Covid 19 testing.  I have updated the patient's demographic information with the patient's contact information for scheduling.    Patient having symptoms?: yes  If yes, list symptoms:fever and nausea/diarrhea      If no, this patient is scheduled for surgery.    The patient will patient be having surgery at:   The scheduled date for the surgery is:     This provider has placed the order in Epic: yes  If no, this provider was directed to fax the order to [phone]: N/A    Ordering provider (First - Last): Peter Raines Callback #: [phone]  Fax number (If provider wants results and does not use In Basket): [facility]    This patient is a [employer] Employee: no  If yes: Hospital/Facility & Unit/Department & Job Title:

## 2020-07-09 NOTE — PROGRESS NOTES
Patient was processed today at [facility]-19 drive-up clinic.  Pt denies any sort of medical distress today.  After identifying the patient, a nasopharyngeal sample was obtained and placed in viral transport medium.  Pt was given a post-collection education sheet and encouraged to self-isolate until they receive the results.  Pt directed to [facility] website for [facility] Privacy Practices.  Sample sent to the lab noted on the order and requisition.  Pt was without additional questions or concerns and was dispositioned from the testing area to home.

## 2020-07-10 LAB — SARS-COV-2 RNA RESP QL NAA+PROBE: NOT DETECTED

## 2020-07-13 ENCOUNTER — DOCUMENTATION (OUTPATIENT)
Dept: PRIMARY CARE | Facility: CLINIC | Age: 70
End: 2020-07-13

## 2020-07-13 NOTE — PROGRESS NOTES
Patient notified that test was negative. Patient improved. Advised to call if not resolving or if worsening.

## 2020-07-14 ENCOUNTER — APPOINTMENT (OUTPATIENT)
Dept: LAB | Facility: CLINIC | Age: 70
End: 2020-07-14
Attending: FAMILY MEDICINE
Payer: MEDICARE

## 2020-07-14 DIAGNOSIS — E78.2 MIXED HYPERLIPIDEMIA: Chronic | ICD-10-CM

## 2020-07-14 DIAGNOSIS — R73.9 HYPERGLYCEMIA: ICD-10-CM

## 2020-07-14 LAB
ALBUMIN SERPL-MCNC: 4 G/DL (ref 3.4–5)
ALP SERPL-CCNC: 51 IU/L (ref 35–126)
ALT SERPL-CCNC: 19 IU/L (ref 11–54)
ANION GAP SERPL CALC-SCNC: 9 MEQ/L (ref 3–15)
AST SERPL-CCNC: 23 IU/L (ref 15–41)
BASOPHILS # BLD: 0.05 K/UL (ref 0.01–0.1)
BASOPHILS NFR BLD: 0.8 %
BILIRUB SERPL-MCNC: 0.7 MG/DL (ref 0.3–1.2)
BUN SERPL-MCNC: 13 MG/DL (ref 8–20)
CALCIUM SERPL-MCNC: 9.5 MG/DL (ref 8.9–10.3)
CHLORIDE SERPL-SCNC: 104 MEQ/L (ref 98–109)
CHOLEST SERPL-MCNC: 178 MG/DL
CO2 SERPL-SCNC: 27 MEQ/L (ref 22–32)
CREAT SERPL-MCNC: 0.9 MG/DL (ref 0.6–1.1)
DIFFERENTIAL METHOD BLD: ABNORMAL
EOSINOPHIL # BLD: 0.26 K/UL (ref 0.04–0.36)
EOSINOPHIL NFR BLD: 4.4 %
ERYTHROCYTE [DISTWIDTH] IN BLOOD BY AUTOMATED COUNT: 12.5 % (ref 11.7–14.4)
EST. AVERAGE GLUCOSE BLD GHB EST-MCNC: 117 MG/DL
GFR SERPL CREATININE-BSD FRML MDRD: >60 ML/MIN/1.73M*2
GLUCOSE SERPL-MCNC: 112 MG/DL (ref 70–99)
HBA1C MFR BLD HPLC: 5.7 %
HCT VFR BLDCO AUTO: 41 % (ref 35–45)
HDLC SERPL-MCNC: 53 MG/DL
HDLC SERPL: 3.4 {RATIO}
HGB BLD-MCNC: 13.5 G/DL (ref 11.8–15.7)
IMM GRANULOCYTES # BLD AUTO: 0.01 K/UL (ref 0–0.08)
IMM GRANULOCYTES NFR BLD AUTO: 0.2 %
LDLC SERPL CALC-MCNC: 113 MG/DL
LYMPHOCYTES # BLD: 2.07 K/UL (ref 1.2–3.5)
LYMPHOCYTES NFR BLD: 34.7 %
MCH RBC QN AUTO: 32.2 PG (ref 28–33.2)
MCHC RBC AUTO-ENTMCNC: 32.9 G/DL (ref 32.2–35.5)
MCV RBC AUTO: 97.9 FL (ref 83–98)
MONOCYTES # BLD: 0.47 K/UL (ref 0.28–0.8)
MONOCYTES NFR BLD: 7.9 %
NEUTROPHILS # BLD: 3.11 K/UL (ref 1.7–7)
NEUTS SEG NFR BLD: 52 %
NONHDLC SERPL-MCNC: 125 MG/DL
NRBC BLD-RTO: 0 %
PDW BLD AUTO: 8.7 FL (ref 9.4–12.3)
PLATELET # BLD AUTO: 316 K/UL (ref 150–369)
POTASSIUM SERPL-SCNC: 4.4 MEQ/L (ref 3.6–5.1)
PROT SERPL-MCNC: 6.6 G/DL (ref 6–8.2)
RBC # BLD AUTO: 4.19 M/UL (ref 3.93–5.22)
SODIUM SERPL-SCNC: 140 MEQ/L (ref 136–144)
TRIGL SERPL-MCNC: 61 MG/DL (ref 30–149)
TSH SERPL DL<=0.05 MIU/L-ACNC: 1.59 MIU/L (ref 0.34–5.6)
WBC # BLD AUTO: 5.97 K/UL (ref 3.8–10.5)

## 2020-07-14 PROCEDURE — 84443 ASSAY THYROID STIM HORMONE: CPT

## 2020-07-14 PROCEDURE — 36415 COLL VENOUS BLD VENIPUNCTURE: CPT

## 2020-07-14 PROCEDURE — 80061 LIPID PANEL: CPT

## 2020-07-14 PROCEDURE — 85025 COMPLETE CBC W/AUTO DIFF WBC: CPT

## 2020-07-14 PROCEDURE — 83036 HEMOGLOBIN GLYCOSYLATED A1C: CPT

## 2020-07-14 PROCEDURE — 80053 COMPREHEN METABOLIC PANEL: CPT

## 2020-07-15 ENCOUNTER — DOCUMENTATION (OUTPATIENT)
Dept: PRIMARY CARE | Facility: CLINIC | Age: 70
End: 2020-07-15

## 2020-07-15 NOTE — PROGRESS NOTES
Patient notified of blood test results.  Has mild impaired fasting glucose.  Patient advised to follow a low carbohydrate diet and low concentrated sweet diet.  Advised to exercise 2.5 hours per week.  Advised to reduce body mass index to less than 25.  Requires periodic monitoring of A1C.  Lipids are stable and will continue on same dose of rosuvastatin.  Liver function tests were normal.  All other tests were stable and no other new recommendations advised at this time.

## 2020-08-14 NOTE — PROGRESS NOTES
Peter Solorzano MD    [facility]  [address], [address]  [place] [address]  Phone: [phone]  Fax: [phone]     History of Present Illness     Subjective     Patient ID: Ashleigh Peng is a 69 y.o. female.    Hyperlipidemia   This is a chronic problem. She has no history of diabetes, hypothyroidism or liver disease. Pertinent negatives include no chest pain, focal weakness, leg pain, myalgias or shortness of breath. Current antihyperlipidemic treatment includes statins. There are no compliance problems.  Risk factors for coronary artery disease include dyslipidemia.        Past Medical/Surgical/Family/Social History       The following have been reviewed and updated as appropriate in this visit:  Allergies  Meds  Problems         Past Medical History:   Diagnosis Date   • Hyperglycemia 12/12/2018   • Hyperlipidemia 10/26/2015    Cardiologist: Dr. Guzman. Managed with Rosuvastatin, Ezetimibe, and Lovaza.  Requires periodic monitoring of lipids and liver function tests.  Advised to follow low fat diet and to keep BMI < 25.  Advised to exercise for 2.5 hours per week.     • Malignant neoplasm of female breast (CMS/HCC) 3/1/2007    Oncologist: Dr. Jon Solorzano Surgeon: Dr. Bragg Right lumpectomy. Dx  BREAST CANCER, Dx Date 3/2007, Histology  , Stage @ Dx  T1,N0, ER/RI+ HERII+, Disease Status  SIN Treatment was radiation and Arimidex x 5 years Managed with yearly mammograms.       Past Surgical History:   Procedure Laterality Date   • BREAST LUMPECTOMY Right 2007    Dr. Bragg   • WISDOM TOOTH EXTRACTION         Family History   Problem Relation Age of Onset   • Kidney disease Biological Mother    • Heart disease Biological Mother    • Hyperlipidemia Biological Mother    • COPD Biological Father    • No Known Problems Biological Sister    • Hyperlipidemia Biological Brother    • Brain cancer Paternal Grandmother    • Lung cancer Paternal Grandfather    • No Known Problems  Biological Brother        Social History     Tobacco Use   • Smoking status: Current Every Day Smoker     Packs/day: 0.50     Years: 50.00     Pack years: 25.00     Types: Cigarettes   • Smokeless tobacco: Current User   Substance Use Topics   • Alcohol use: Yes     Alcohol/week: 2.0 - 3.0 standard drinks     Types: 2 - 3 Standard drinks or equivalent per week   • Drug use: Never       Patient Care Team:  Peter Solorzano MD as PCP - General (Family Medicine)  Pepe Jasmine MD as Consulting Physician (Obstetrics and Gynecology)  Kun Guzman MD as Consulting Physician (Cardiology)  Donis Patten MD as Consulting Physician (Ophthalmology)  Jon Solorzano MD as Consulting Physician (Oncology)  Matt Bacon MD as Consulting Physician (Dermatology)     Allergies and Medications       Allergies   Allergen Reactions   • No Known Allergies        Current Outpatient Medications   Medication Sig Dispense Refill   • aspirin 81 mg enteric coated tablet Take 81 mg by mouth daily.      • cholecalciferol, vitamin D3, 2,000 unit tablet Take 2,000 Units by mouth daily.     • cyanocobalamin (VITAMIN B12) 1,000 mcg tablet Take 1,000 mcg by mouth daily.     • ezetimibe (ZETIA) 10 mg tablet Take 10 mg by mouth daily.      • fluticasone propionate (FLONASE) 50 mcg/actuation nasal spray Administer 1 spray into each nostril daily. 1 Bottle 0   • omega-3 acid ethyl esters (LOVAZA) 1 gram capsule Take 1 g by mouth daily.      • rosuvastatin (CRESTOR) 10 mg tablet Take 10 mg by mouth daily.      • ubidecarenone (COENZYME Q10 ORAL) Take 1 tablet by mouth daily.       No current facility-administered medications for this visit.           Review of Systems       Review of Systems   Constitutional: Negative for unexpected weight change.   Eyes: Negative for visual disturbance.   Respiratory: Negative for cough, chest tightness, shortness of breath and wheezing.    Cardiovascular: Negative for chest pain, palpitations and leg  "swelling.   Musculoskeletal: Negative for arthralgias and myalgias.   Neurological: Negative for dizziness, focal weakness, syncope, speech difficulty, weakness, light-headedness and headaches.   Psychiatric/Behavioral: Negative for confusion.        Physical Examination       Objective     Vitals:    11/26/19 0747   BP: 100/60   Pulse: 83   Resp: 16   Temp: 36.6 °C (97.8 °F)   SpO2: 98%       Pulse Readings from Last 5 Encounters:   11/26/19 83   08/19/19 95   07/09/19 89   05/30/19 87   12/12/18 78       Wt Readings from Last 5 Encounters:   11/26/19 58.2 kg (128 lb 6.4 oz)   08/19/19 57.5 kg (126 lb 12.8 oz)   07/09/19 58.5 kg (129 lb)   12/12/18 61.7 kg (136 lb)       Ht Readings from Last 5 Encounters:   11/26/19 1.715 m (5' 7.5\")   08/19/19 1.715 m (5' 7.5\")   07/09/19 1.715 m (5' 7.5\")       BP Readings from Last 5 Encounters:   11/26/19 100/60   08/19/19 118/68   07/09/19 122/62   05/30/19 122/70   12/12/18 108/72       Physical Exam   Constitutional: She appears well-developed and well-nourished. She is cooperative.   Neck: No JVD present. Carotid bruit is not present.   Cardiovascular: Normal rate, regular rhythm, S1 normal, S2 normal and normal heart sounds. Exam reveals no gallop, no S3 and no S4.   No murmur heard.  Pulses:       Dorsalis pedis pulses are 2+ on the right side, and 2+ on the left side.        Posterior tibial pulses are 2+ on the right side, and 2+ on the left side.   No edema bilaterally   Pulmonary/Chest: Effort normal and breath sounds normal. No accessory muscle usage. No respiratory distress. She has no wheezes. She has no rhonchi. She has no rales.   Abdominal: Soft. Normal aorta and bowel sounds are normal. There is no splenomegaly or hepatomegaly. There is no tenderness. There is no rebound and no guarding.   Musculoskeletal: She exhibits no edema.   Neurological: She is alert.        Laboratory Results     Lab Results   Component Value Date    WBC 5.77 08/19/2019    WBC 6.70 " 07/31/2019    WBC 7.39 06/21/2016    HGB 12.5 08/19/2019    HGB 12.4 07/31/2019    HGB 14.3 06/21/2016    HCT 38.9 08/19/2019    HCT 39.0 07/31/2019    HCT 43.2 06/21/2016    MCV 96.8 08/19/2019    MCV 98.5 (H) 07/31/2019    MCV 94.7 06/21/2016     08/19/2019     07/31/2019     06/21/2016        Lab Results   Component Value Date    GLUCOSE 111 (H) 08/19/2019    GLUCOSE 92 07/31/2019    GLUCOSE 113 (H) 06/21/2016    CALCIUM 9.4 08/19/2019    CALCIUM 9.3 07/31/2019    CALCIUM 9.5 06/21/2016     08/19/2019     07/31/2019     06/21/2016    K 4.3 08/19/2019    K 4.2 07/31/2019    K 4.8 06/21/2016    CO2 27 08/19/2019    CO2 27 07/31/2019    CO2 28 06/21/2016     08/19/2019     07/31/2019     06/21/2016    BUN 9 08/19/2019    BUN 10 07/31/2019    BUN 16 06/21/2016    CREATININE 0.9 08/19/2019    CREATININE 1.0 07/31/2019    CREATININE 1.0 06/21/2016    ALKPHOS 64 08/19/2019    ALKPHOS 62 07/31/2019    ALKPHOS 64 06/21/2016    AST 20 08/19/2019    AST 19 07/31/2019    AST 30 06/21/2016    ALT 13 08/19/2019    ALT 13 07/31/2019    ALT 26 06/21/2016    BILITOT 0.5 08/19/2019    BILITOT 0.8 07/31/2019    BILITOT 0.3 06/21/2016    ALBUMIN 3.7 08/19/2019    ALBUMIN 3.6 07/31/2019    ALBUMIN 4.3 06/21/2016       Lab Results   Component Value Date    CHOL 207 (H) 06/21/2016    CHOL 196 12/24/2015    CHOL 184 06/03/2015     Lab Results   Component Value Date    HDL 71 06/21/2016    HDL 58 12/24/2015    HDL 62 06/03/2015     Lab Results   Component Value Date    LDLCALC 123 (H) 06/21/2016    LDLCALC 123 (H) 12/24/2015    LDLCALC 113 (H) 06/03/2015     Lab Results   Component Value Date    TRIG 66 06/21/2016    TRIG 73 12/24/2015    TRIG 45 06/03/2015       Lab Results   Component Value Date    TSH 1.55 07/31/2019    TSH 1.84 12/12/2018       Lab Results   Component Value Date    HGBA1C 5.6 06/21/2016    HGBA1C 5.3 06/03/2015       Lab Results   Component Value Date    HEPCAB  Nonreactive 12/12/2018       Immunization History   Administered Date(s) Administered   • Hepatitis A 01/15/2015, 06/06/2015   • Influenza Split High Dose Preservative Free IM 10/26/2015   • Influenza Vaccine High Dose 65 And Older 10/01/2019   • Influenza Vaccine Quadrivalent 3 Yr And Older 01/01/2009, 01/01/2010, 01/01/2011, 01/01/2012, 01/01/2013, 01/01/2014   • Influenza Vaccine Quadrivalent Preservative Free 6-35 Months 10/31/2016   • Influenza, Unspecified 11/20/2013, 11/01/2016, 10/01/2018   • Pneumococcal Conjugate 13-Valent 10/26/2015   • Pneumococcal Polysaccharide 10/12/2011   • Tdap 10/10/2010   • Zoster 12/09/2015         Health Maintenance Topics with due status: Overdue       Topic Date Due    Zoster Vaccine 02/03/2016    Pneumococcal (65 years and older) 10/26/2016     Health Maintenance Topics with due status: Not Due       Topic Last Completion Date    DTaP, Tdap, and Td Vaccines 10/10/2010    Colonoscopy 11/08/2016    DEXA Scan 10/17/2018    Mammogram 04/23/2019    Annual Falls Risk Screening 11/26/2019     Health Maintenance Topics with due status: Completed       Topic Last Completion Date    Hepatitis C Screening 12/12/2018    Influenza Vaccine 10/01/2019     Health Maintenance Topics with due status: Aged Out       Topic Date Due    Meningococcal ACWY Aged Out    Varicella Vaccines Aged Out    HIB Vaccines Aged Out    IPV Vaccines Aged Out    HPV Vaccines Aged Out    Pneumococcal Aged Out        Assessment and Plan       Assessment/Plan     Problem List Items Addressed This Visit     Hyperlipidemia - Primary (Chronic)    Overview     · Cardiologist: Dr. Guzman.  · Managed with Rosuvastatin, Ezetimibe, and Lovaza.   · Requires periodic monitoring of lipids and liver function tests.   · Advised to follow low fat diet and to keep BMI < 25.   · Advised to exercise for 2.5 hours per week.            Current Assessment & Plan     Lipids controlled at this time.  Continue present medication.  No  myalgias or arthralgias.  Check lipids.  Follow low fat diet.  Exercise for 2.5 hours per week.           Relevant Medications    omega-3 acid ethyl esters (LOVAZA) 1 gram capsule    Other Relevant Orders    Lipid panel    Hemoglobin A1c    Malignant neoplasm of female breast (CMS/HCC) (Chronic)    Overview     · Oncologist: Dr. Jon Solorzano Surgeon: Dr. Bragg  · Right lumpectomy.  · Dx  BREAST CANCER, Dx Date 3/2007, Histology  , Stage @ Dx  T1,N0, ER/WA+ HERII+, Disease Status  SIN  · Treatment was radiation and Arimidex x 5 years  · Managed with yearly mammograms.         Current Assessment & Plan     Follow up with Dr. Jon Solorzano.         Hyperglycemia    Current Assessment & Plan     Check A1C.         Relevant Orders    Lipid panel    Hemoglobin A1c        Return in about 6 months (around 5/26/2020) for Medicare annual wellness exam.    Orders Placed This Encounter   Procedures   • Pneumococcal polysaccharide vaccine 23-valent greater than or equal to 3yo subcutaneous/IM   • Lipid panel     Standing Status:   Future     Standing Expiration Date:   11/26/2020   • Hemoglobin A1c     Standing Status:   Future     Standing Expiration Date:   11/26/2020              Peter Solorzano MD    11/26/2019        Topical Retinoid counseling:  Patient advised to apply a pea-sized amount only at bedtime and wait 30 minutes after washing their face before applying.  If too drying, patient may add a non-comedogenic moisturizer. The patient verbalized understanding of the proper use and possible adverse effects of retinoids.  All of the patient's questions and concerns were addressed.

## 2020-11-03 ENCOUNTER — TRANSCRIBE ORDERS (OUTPATIENT)
Dept: SCHEDULING | Age: 70
End: 2020-11-03

## 2020-11-03 DIAGNOSIS — H90.41 SENSORINEURAL HEARING LOSS, UNILATERAL, RIGHT EAR, WITH UNRESTRICTED HEARING ON THE CONTRALATERAL SIDE: Primary | ICD-10-CM

## 2020-11-04 ENCOUNTER — APPOINTMENT (OUTPATIENT)
Dept: LAB | Facility: CLINIC | Age: 70
End: 2020-11-04
Attending: SPECIALIST
Payer: MEDICARE

## 2020-11-04 DIAGNOSIS — H90.41 SENSORINEURAL HEARING LOSS, UNILATERAL, RIGHT EAR, WITH UNRESTRICTED HEARING ON THE CONTRALATERAL SIDE: ICD-10-CM

## 2020-11-04 LAB
BUN SERPL-MCNC: 14 MG/DL (ref 8–20)
CREAT SERPL-MCNC: 1 MG/DL (ref 0.6–1.1)
GFR SERPL CREATININE-BSD FRML MDRD: 54.8 ML/MIN/1.73M*2

## 2020-11-04 PROCEDURE — 84520 ASSAY OF UREA NITROGEN: CPT

## 2020-11-04 PROCEDURE — 36415 COLL VENOUS BLD VENIPUNCTURE: CPT

## 2020-11-04 PROCEDURE — 82565 ASSAY OF CREATININE: CPT

## 2020-11-09 ENCOUNTER — HOSPITAL ENCOUNTER (OUTPATIENT)
Dept: RADIOLOGY | Facility: CLINIC | Age: 70
Discharge: HOME | End: 2020-11-09
Attending: SPECIALIST
Payer: MEDICARE

## 2020-11-09 DIAGNOSIS — H90.41 SENSORINEURAL HEARING LOSS, UNILATERAL, RIGHT EAR, WITH UNRESTRICTED HEARING ON THE CONTRALATERAL SIDE: ICD-10-CM

## 2020-11-09 RX ORDER — GADOBUTROL 604.72 MG/ML
6 INJECTION INTRAVENOUS ONCE
Status: COMPLETED | OUTPATIENT
Start: 2020-11-09 | End: 2020-11-09

## 2020-11-09 RX ADMIN — GADOBUTROL 6 MMOL: 604.72 INJECTION INTRAVENOUS at 14:22

## 2020-11-24 ENCOUNTER — APPOINTMENT (OUTPATIENT)
Dept: LAB | Facility: CLINIC | Age: 70
End: 2020-11-24
Attending: OTOLARYNGOLOGY
Payer: MEDICARE

## 2020-11-24 ENCOUNTER — TRANSCRIBE ORDERS (OUTPATIENT)
Dept: LAB | Facility: CLINIC | Age: 70
End: 2020-11-24

## 2020-11-24 DIAGNOSIS — H91.21 SUDDEN IDIOPATHIC HEARING LOSS, RIGHT EAR: Primary | ICD-10-CM

## 2020-11-24 DIAGNOSIS — H91.21 SUDDEN IDIOPATHIC HEARING LOSS, RIGHT EAR: ICD-10-CM

## 2020-11-24 LAB — ERYTHROCYTE [SEDIMENTATION RATE] IN BLOOD BY WESTERGREN METHOD: 5 MM/HR

## 2020-11-24 PROCEDURE — 36415 COLL VENOUS BLD VENIPUNCTURE: CPT

## 2020-11-24 PROCEDURE — 86038 ANTINUCLEAR ANTIBODIES: CPT

## 2020-11-24 PROCEDURE — 86618 LYME DISEASE ANTIBODY: CPT

## 2020-11-24 PROCEDURE — 86431 RHEUMATOID FACTOR QUANT: CPT

## 2020-11-24 PROCEDURE — 86780 TREPONEMA PALLIDUM: CPT

## 2020-11-24 PROCEDURE — 85652 RBC SED RATE AUTOMATED: CPT

## 2020-11-25 LAB
ANA SER QL IA: NEGATIVE
B BURGDOR AB SER IA-ACNC: 0.49 RATIO
RHEUMATOID FACT SERPL-ACNC: 10 IU/ML

## 2020-11-27 LAB — T PALLIDUM AB SER QL IF: NORMAL

## 2021-02-23 ENCOUNTER — TRANSCRIBE ORDERS (OUTPATIENT)
Dept: LAB | Facility: CLINIC | Age: 71
End: 2021-02-23

## 2021-02-23 ENCOUNTER — APPOINTMENT (OUTPATIENT)
Dept: LAB | Facility: CLINIC | Age: 71
End: 2021-02-23
Attending: INTERNAL MEDICINE
Payer: MEDICARE

## 2021-02-23 DIAGNOSIS — Z79.899 OTHER LONG TERM (CURRENT) DRUG THERAPY: ICD-10-CM

## 2021-02-23 DIAGNOSIS — E78.2 MIXED HYPERLIPIDEMIA: ICD-10-CM

## 2021-02-23 DIAGNOSIS — E78.2 MIXED HYPERLIPIDEMIA: Primary | ICD-10-CM

## 2021-02-23 LAB
CHOLEST SERPL-MCNC: 189 MG/DL
HDLC SERPL-MCNC: 68 MG/DL
HDLC SERPL: 2.8 {RATIO}
LDLC SERPL CALC-MCNC: 110 MG/DL
NONHDLC SERPL-MCNC: 121 MG/DL
TRIGL SERPL-MCNC: 54 MG/DL (ref 30–149)

## 2021-02-23 PROCEDURE — 80061 LIPID PANEL: CPT

## 2021-02-23 PROCEDURE — 36415 COLL VENOUS BLD VENIPUNCTURE: CPT

## 2021-04-14 DIAGNOSIS — Z23 ENCOUNTER FOR IMMUNIZATION: ICD-10-CM

## 2021-06-12 NOTE — PROGRESS NOTES
Lashay Mejia D.O.  [facility]  [address]  [place] [address]  Phone: [phone]  Fax: [phone]     History of Present Illness   Patient ID: Ashleigh Peng is a 68 y.o. female.    Subjective     Rash   Pertinent negatives include no congestion, eye pain, fever or vomiting.     # c/o rash B/L hips  Patient presents complaining of rash bilateral hips that started about 4 weeks ago right before she took a cruise.  The rash apparently started on the left hip was read slightly tender to touch and then a few days later she noticed some red blotchy areas on the right hip.  Is been fairly persistent since that time and really only tender on palpation.  She denies any joint pain, back pain, fever or extreme fatigue.      1.  History of right breast CA  Diagnosed in 2007 status post lumpectomy and with radiation followed by Dr. Solorzano oncology at [facility] and her GYN Dr. Jasmine.    2.  Hyperlipidemia  Stable.  She is compliant with medication along with a low-cholesterol diet.  She is monitored by cardiology and labs are usually ordered through their office.  Meds-Crestor 10 mg daily / Zetia 10 mg daily  Cardio Dr. Pereyra    3.  Hyperglycemia  Patient has had noted hyperglycemia on her most recent lab results over the past year or so averaging from 103 all the way up to 113.  Patient reports her most recent  A1c 5.6  This result being completed about 2 months ago through the cardiologist's office.       Past Medical/Surgical/Family/Social History     The following have been reviewed and updated as appropriate in this visit:  Problems         Past Medical History:   Diagnosis Date   • Hyperglycemia 12/12/2018   • Hyperlipidemia 10/26/2015    Overview:    • Malignant neoplasm of female breast (CMS/HCC) (HCC) 3/1/2007    Overview:  Dx  BREAST CANCER, Dx Date 3/2007, Histology  , Stage @ Dx  T1,N0, ER/UT+ HERII+, Disease Status  SIN, Tx Status  TREATMENT ONGOING, Comments         History reviewed. No pertinent surgical history.    History reviewed. No pertinent family history.    Social History     Social History   • Marital status:      Spouse name: N/A   • Number of children: N/A   • Years of education: N/A     Occupational History   • Not on file.     Social History Main Topics   • Smoking status: Current Every Day Smoker   • Smokeless tobacco: Current User   • Alcohol use Yes   • Drug use: No   • Sexual activity: Not on file     Other Topics Concern   • Not on file     Social History Narrative   • No narrative on file      Allergies and Medications     Allergies   Allergen Reactions   • No Known Allergies          Current Outpatient Prescriptions:   •  aspirin 81 mg enteric coated tablet, 81 mg., Disp: , Rfl:   •  ezetimibe (ZETIA) 10 mg tablet, Take 10 mg by mouth daily.  , Disp: , Rfl:   •  rosuvastatin (CRESTOR) 10 mg tablet, once daily, Disp: , Rfl:   •  calcium carbonate/vitamin D3 (CALCIUM+D ORAL), None Entered, Disp: , Rfl:   •  cephalexin (KEFLEX) 500 mg capsule, Take 1 capsule (500 mg total) by mouth 3 (three) times a day for 7 days., Disp: 21 capsule, Rfl: 0  •  fluticasone propionate (FLONASE) 50 mcg/actuation nasal spray, Administer 1 spray into each nostril daily. (Patient not taking: Reported on 7/9/2019 ), Disp: 1 Bottle, Rfl: 0   Review of Systems       Review of Systems   Constitutional: Negative for activity change, chills, diaphoresis and fever.   HENT: Negative for congestion, ear discharge, ear pain, facial swelling, mouth sores, nosebleeds and voice change.    Eyes: Negative for photophobia, pain, discharge, redness and itching.   Respiratory: Negative for choking, chest tightness, wheezing and stridor.    Cardiovascular: Negative for chest pain, palpitations and leg swelling.   Gastrointestinal: Negative for abdominal distention, abdominal pain, anal bleeding, blood in stool and vomiting.   Endocrine: Negative for cold  "intolerance, heat intolerance, polydipsia and polyphagia.   Genitourinary: Negative for difficulty urinating, dysuria, flank pain, frequency and urgency.   Musculoskeletal: Negative for gait problem, joint swelling and myalgias.   Skin: Positive for rash. Negative for color change and pallor.   Neurological: Negative for seizures, facial asymmetry, speech difficulty, light-headedness and numbness.      Physical Examination       Objective     Vitals:    07/09/19 1541   BP: 122/62   Pulse: 89   Resp: 16   Temp: 37.2 °C (99 °F)   SpO2: 94%       Vitals:    07/09/19 1541   BP: 122/62   BP Location: Left upper arm   Patient Position: Sitting   Pulse: 89   Resp: 16   Temp: 37.2 °C (99 °F)   TempSrc: Oral   SpO2: 94%   Weight: 58.5 kg (129 lb)   Height: 1.715 m (5' 7.5\")       Wt Readings from Last 5 Encounters:   07/09/19 58.5 kg (129 lb)   12/12/18 61.7 kg (136 lb)       Ht Readings from Last 5 Encounters:   07/09/19 1.715 m (5' 7.5\")       BP Readings from Last 5 Encounters:   07/09/19 122/62   05/30/19 122/70   12/12/18 108/72       Physical Exam   Constitutional: She is oriented to person, place, and time. She appears well-developed and well-nourished.   HENT:   Head: Normocephalic and atraumatic.   Right Ear: External ear normal.   Left Ear: External ear normal.   Eyes: Pupils are equal, round, and reactive to light. Conjunctivae and EOM are normal.   Neck: Normal range of motion. Neck supple. No JVD present. No tracheal deviation present. No thyromegaly present.   Cardiovascular: Normal rate, regular rhythm, normal heart sounds and intact distal pulses.    Pulmonary/Chest: Effort normal and breath sounds normal. No respiratory distress. She has no wheezes.   Musculoskeletal: She exhibits no edema, tenderness or deformity.   Neurological: She is alert and oriented to person, place, and time. She exhibits normal muscle tone. Coordination normal.   Skin: Skin is warm and dry. No rash noted. No erythema.   Right hip " area there is an area of 5 x 4 cm round slightly indurated erythema and minimal tenderness to touch.  Area is warm to palpation.  The left hip with a small area of induration measuring about 3 cm round limited amount of erythema.  No vesicles noted.   Psychiatric: She has a normal mood and affect.   Nursing note and vitals reviewed.     Laboratory Results     Lab Results   Component Value Date    WBC 7.39 06/21/2016    HGB 14.3 06/21/2016    HCT 43.2 06/21/2016    MCV 94.7 06/21/2016     06/21/2016          Chemistry        Component Value Date/Time     06/21/2016 0859    K 4.8 06/21/2016 0859     06/21/2016 0859    CO2 28 06/21/2016 0859    BUN 16 06/21/2016 0859    CREATININE 1.0 06/21/2016 0859        Component Value Date/Time    CALCIUM 9.5 06/21/2016 0859    ALKPHOS 64 06/21/2016 0859    AST 30 06/21/2016 0859    ALT 26 06/21/2016 0859    BILITOT 0.3 06/21/2016 0859            Lab Results   Component Value Date    GLUCOSE 113 (H) 06/21/2016    GLUCOSE 104 (H) 06/03/2015    CALCIUM 9.5 06/21/2016     06/21/2016    K 4.8 06/21/2016    CO2 28 06/21/2016     06/21/2016    BUN 16 06/21/2016    CREATININE 1.0 06/21/2016       Lab Results   Component Value Date    CHOL 207 (H) 06/21/2016    CHOL 196 12/24/2015    CHOL 184 06/03/2015     Lab Results   Component Value Date    HDL 71 06/21/2016    HDL 58 12/24/2015    HDL 62 06/03/2015     Lab Results   Component Value Date    LDLCALC 123 (H) 06/21/2016    LDLCALC 123 (H) 12/24/2015    LDLCALC 113 (H) 06/03/2015     Lab Results   Component Value Date    TRIG 66 06/21/2016    TRIG 73 12/24/2015    TRIG 45 06/03/2015     No results found for: CHOLHDL    Lab Results   Component Value Date    TSH 1.84 12/12/2018       Lab Results   Component Value Date    HGBA1C 5.6 06/21/2016    HGBA1C 5.3 06/03/2015       Lab Results   Component Value Date    HEPCAB Nonreactive 12/12/2018         Immunization History   Administered Date(s) Administered   •  Hepatitis A 01/15/2015, 06/06/2015   • Influenza Split High Dose Preservative Free IM 10/26/2015   • Influenza Vaccine Quadrivalent 3 Yr And Older 01/01/2009, 01/01/2010, 01/01/2011, 01/01/2012, 01/01/2013, 01/01/2014   • Influenza Vaccine Quadrivalent Preservative Free 6-35 Months 10/31/2016   • Influenza, Unspecified 11/20/2013, 11/01/2016, 10/01/2018   • Pneumococcal Conjugate 13-Valent 10/26/2015   • Pneumococcal Polysaccharide 10/12/2011   • Tdap 10/10/2010   • Zoster 12/09/2015       Health Maintenance Topics with due status: Overdue       Topic Date Due    Medicare Annual Wellness Visit 09/14/2015    Annual Falls Risk Screening 09/14/2015    Zoster Vaccine 02/03/2016    Pneumococcal (65 years and older) 10/26/2016     Health Maintenance Topics with due status: Not Due       Topic Last Completion Date    DTaP, Tdap, and Td Vaccines 10/10/2010    Colonoscopy 11/08/2016    Influenza Vaccine 10/01/2018    DEXA Scan 10/17/2018    Mammogram 04/23/2019     Health Maintenance Topics with due status: Completed / Addressed / Aged Out       Topic Last Completion Date    Pneumococcal 10/26/2015    Hepatitis C Screening 12/12/2018    Meningococcal ACWY Aged Out    Varicella Vaccines Aged Out    HIB Vaccines Aged Out    IPV Vaccines Aged Out    HPV Vaccines Aged Out          Assessment and Plan       Assessment/Plan     Problem List Items Addressed This Visit     Hyperlipidemia    Overview     Overview:          Current Assessment & Plan     Continue with Crestor and Zetia along with a low-cholesterol diet.           Cellulitis of right lower extremity - Primary    Current Assessment & Plan     Patient appears to have an ongoing cellulitis on both hips more so on the right and was started on Keflex 500 mg 3 times daily until finished.  Recommended warm compresses tolerated to the area.  If no significant improvements over the next 7 days she will need to come in for a follow-up visit.               No Follow-up on  file.    No orders of the defined types were placed in this encounter.         Av Mejia, DO  7/9/2019      97

## 2021-07-21 ENCOUNTER — OFFICE VISIT (OUTPATIENT)
Dept: CARDIOLOGY | Facility: CLINIC | Age: 71
End: 2021-07-21
Payer: MEDICARE

## 2021-07-21 VITALS
DIASTOLIC BLOOD PRESSURE: 80 MMHG | SYSTOLIC BLOOD PRESSURE: 119 MMHG | HEIGHT: 67 IN | OXYGEN SATURATION: 99 % | BODY MASS INDEX: 20.99 KG/M2 | HEART RATE: 73 BPM

## 2021-07-21 DIAGNOSIS — I34.0 NONRHEUMATIC MITRAL VALVE REGURGITATION: ICD-10-CM

## 2021-07-21 DIAGNOSIS — R01.1 MURMUR: ICD-10-CM

## 2021-07-21 DIAGNOSIS — E78.2 MIXED HYPERLIPIDEMIA: Chronic | ICD-10-CM

## 2021-07-21 DIAGNOSIS — I25.10 CORONARY ARTERY DISEASE, UNSPECIFIED VESSEL OR LESION TYPE, UNSPECIFIED WHETHER ANGINA PRESENT, UNSPECIFIED WHETHER NATIVE OR TRANSPLANTED HEART: Primary | ICD-10-CM

## 2021-07-21 DIAGNOSIS — I51.89 DIASTOLIC DYSFUNCTION: Chronic | ICD-10-CM

## 2021-07-21 PROCEDURE — 99214 OFFICE O/P EST MOD 30 MIN: CPT | Performed by: INTERNAL MEDICINE

## 2021-07-21 PROCEDURE — 93000 ELECTROCARDIOGRAM COMPLETE: CPT | Performed by: INTERNAL MEDICINE

## 2021-07-21 RX ORDER — ROSUVASTATIN CALCIUM 20 MG/1
40 TABLET, COATED ORAL DAILY
Qty: 90 TABLET | Refills: 3 | Status: SHIPPED | OUTPATIENT
Start: 2021-07-21 | End: 2021-07-29 | Stop reason: SDUPTHER

## 2021-07-21 RX ORDER — ROSUVASTATIN CALCIUM 20 MG/1
40 TABLET, COATED ORAL DAILY
Qty: 90 TABLET | Refills: 3 | Status: SHIPPED | OUTPATIENT
Start: 2021-07-21 | End: 2021-07-21

## 2021-07-21 NOTE — LETTER
July 21, 2021     Kun Guzman MD  [address] [facility]  [address]  [facility] [address]    Patient: Ashleigh Peng  YOB: 1950  Date of Visit: 7/21/2021      Dear Dr. Guzman:    Thank you for referring Ashleigh Peng to me for evaluation. Below are my notes for this consultation.    If you have questions, please do not hesitate to call me. I look forward to following your patient along with you.         Sincerely,        Kun Guzman MD        CC: MD Thomas Conner Michael L, MD  7/21/2021 10:30 AM  Signed     [facility] Follow-up Office Visit       Patient ID: Ashleigh Peng 70 y.o. female 1950  PCP: Peter Solorzano MD      [facility]     Ashleigh Peng is a 70 y.o. female with a history of hyperlipidemia, mild mitral valve prolapse and regurgitation, tobacco abuse, and breast cancer.    She presents today for routine follow-up.  Since her last visit with me she has been feeling well without any change in symptoms.  She had a follow-up lipid panel done which she is concerned about and that it still shows elevated LDL cholesterol.  She has no chest pain, shortness of breath, palpitations, syncope, lower extremity edema, or claudication.  She is still smoking about 10 cigarettes/day.  She knows that she should cut down.  She has no other specific new complaints today.     Medications     Current Outpatient Medications   Medication Instructions   • aspirin 81 mg, oral, Daily   • calcium carbonate-vitamin D3 (CALCIUM 500 + D) 500 mg(1,250mg) -400 unit tablet 1 tablet, oral, 2 times daily   • cholecalciferol (vitamin D3) 2,000 Units, oral, Daily   • cyanocobalamin (VITAMIN B12) 1,000 mcg, oral, Daily   • ezetimibe (ZETIA) 10 mg, oral, Daily   • multivitamin-iron-folic acid  mg-mcg tablet 1 tablet, oral, Daily   • rosuvastatin (CRESTOR) 40 mg, oral, Daily   • ubidecarenone (COENZYME Q10 ORAL) 1 tablet, oral, Daily         Allergies     No known allergies     Vital Signs/Exam  "    Vitals:    07/21/21 0950   BP: 119/80   Pulse: 73   SpO2: 99%   Height: 1.702 m (5' 7\")     BP Readings from Last 3 Encounters:   07/21/21 119/80   07/08/20 118/80   11/26/19 100/60     Wt Readings from Last 3 Encounters:   11/09/20 60.8 kg (134 lb)   07/08/20 61.8 kg (136 lb 3.2 oz)   11/26/19 58.2 kg (128 lb 6.4 oz)        Gen: no distress  HEENT: no jvd  Lungs: clear bilaterally; no crackles, rhonchi, wheezing  Heart: Normal S1 and S2.  Regular rate and rhythm.  There is a soft systolic ejection murmur heard at the right upper sternal border.  Extremities: no edema  Neuro: nonfocal, alert and oriented  Psych: normal mood and affect     Diagnostic Data     Labs:  Lab Results   Component Value Date    CHOL 189 02/23/2021    CHOL 178 07/14/2020     Lab Results   Component Value Date    HDL 68 02/23/2021    HDL 53 (L) 07/14/2020     Lab Results   Component Value Date    LDLCALC 110 (H) 02/23/2021    LDLCALC 113 (H) 07/14/2020     Lab Results   Component Value Date    TRIG 54 02/23/2021    TRIG 61 07/14/2020     Lab Results   Component Value Date    WBC 5.97 07/14/2020    HGB 13.5 07/14/2020     07/14/2020     Lab Results   Component Value Date    GLUCOSE 112 (H) 07/14/2020    CALCIUM 9.5 07/14/2020     07/14/2020    K 4.4 07/14/2020    CO2 27 07/14/2020     07/14/2020    BUN 14 11/04/2020    CREATININE 1.0 11/04/2020     Lab Results   Component Value Date    HGBA1C 5.7 (H) 07/14/2020     CrCl cannot be calculated (Patient's most recent lab result is older than the maximum 28 days allowed.).    Echocardiogram:  Cardiac Imaging    ECHOCARDIOGRAM - EXTERNAL RESULT     Narrative  Ordered by an unspecified provider.  January 2021: Ejection fraction 60%, diastolic dysfunction, calcified trileaflet valve with decreased motion but no stenosis, mild mitral regurgitation with minimal mitral prolapse, trace tricuspid regurgitation.      Stress Tests:             Vascular Studies:  No results found for " this or any previous visit from the past 365 days.    No results found for this or any previous visit from the past 365 days.    No results found for this or any previous visit from the past 365 days.    No results found for this or any previous visit from the past 365 days.      Cardiac cath:      Peripheral:  No results found for this or any previous visit from the past 365 days.      ECG: Independently reviewed: SR, SHAN       Assessment and Plan      Ashleigh Peng is a 70 y.o. female seen today for the following conditions:    Problem List Items Addressed This Visit        Circulatory    Diastolic dysfunction (Chronic)    Mitral regurgitation    Relevant Medications    rosuvastatin (CRESTOR) 20 mg tablet    Murmur       Endocrine/Metabolic    Hyperlipidemia (Chronic)    Relevant Medications    rosuvastatin (CRESTOR) 20 mg tablet      Other Visit Diagnoses     Coronary artery disease, unspecified vessel or lesion type, unspecified whether angina present, unspecified whether native or transplanted heart    -  Primary    Relevant Medications    rosuvastatin (CRESTOR) 20 mg tablet    Other Relevant Orders    ECG 12 LEAD-OFFICE PERFORMED (Completed)            Plan:    #1.  Hyperlipidemia  Her LDL remains higher than ideal while on high intensity statin and Zetia.  I have asked her to increase her Crestor to 40 mg daily and she is agreeable to this.  She is due for primary care follow-up in October and that seems like a good time to recheck her lipid panel with the rest of her routine blood work.    #2.  Valvular heart disease  The patient has mitral valve prolapse which is minimal along with mild mitral regurgitation.  She also has a calcified trileaflet aortic valve with decreased motion but no hemodynamic aortic stenosis.  She has a very mild outflow murmur from her aortic valve.    #3.  Tobacco abuse  Cessation encouraged      I will plan to see her again in about 6 months.    Available medical records were  reviewed and updated where appropriate including laboratory data, imaging studies, and previous outpatient and inpatient records.  Counseling/education performed.      Thank you for allowing me to participate in the care of this patient.      Kun Guzman MD [facility]  7/21/2021  10:27 AM    [facility] Office: [phone]  [facility]: [facility]     This document was generated utilizing voice recognition technology, typographical errors may be present.

## 2021-07-21 NOTE — PROGRESS NOTES
"   [facility]  [place] Follow-up Office Visit       Patient ID: Ashleigh Peng 70 y.o. female 1950  PCP: Peter Solorzano MD      HPI     Ashleigh Peng is a 70 y.o. female with a history of hyperlipidemia, mild mitral valve prolapse and regurgitation, tobacco abuse, and breast cancer.    She presents today for routine follow-up.  Since her last visit with me she has been feeling well without any change in symptoms.  She had a follow-up lipid panel done which she is concerned about and that it still shows elevated LDL cholesterol.  She has no chest pain, shortness of breath, palpitations, syncope, lower extremity edema, or claudication.  She is still smoking about 10 cigarettes/day.  She knows that she should cut down.  She has no other specific new complaints today.     Medications     Current Outpatient Medications   Medication Instructions   • aspirin 81 mg, oral, Daily   • calcium carbonate-vitamin D3 (CALCIUM 500 + D) 500 mg(1,250mg) -400 unit tablet 1 tablet, oral, 2 times daily   • cholecalciferol (vitamin D3) 2,000 Units, oral, Daily   • cyanocobalamin (VITAMIN B12) 1,000 mcg, oral, Daily   • ezetimibe (ZETIA) 10 mg, oral, Daily   • multivitamin-iron-folic acid  mg-mcg tablet 1 tablet, oral, Daily   • rosuvastatin (CRESTOR) 40 mg, oral, Daily   • ubidecarenone (COENZYME Q10 ORAL) 1 tablet, oral, Daily         Allergies     No known allergies     Vital Signs/Exam     Vitals:    07/21/21 0950   BP: 119/80   Pulse: 73   SpO2: 99%   Height: 1.702 m (5' 7\")     BP Readings from Last 3 Encounters:   07/21/21 119/80   07/08/20 118/80   11/26/19 100/60     Wt Readings from Last 3 Encounters:   11/09/20 60.8 kg (134 lb)   07/08/20 61.8 kg (136 lb 3.2 oz)   11/26/19 58.2 kg (128 lb 6.4 oz)        Gen: no distress  HEENT: no jvd  Lungs: clear bilaterally; no crackles, rhonchi, wheezing  Heart: Normal S1 and S2.  Regular rate and rhythm.  There is a soft systolic ejection murmur heard at the right " upper sternal border.  Extremities: no edema  Neuro: nonfocal, alert and oriented  Psych: normal mood and affect     Diagnostic Data     Labs:  Lab Results   Component Value Date    CHOL 189 02/23/2021    CHOL 178 07/14/2020     Lab Results   Component Value Date    HDL 68 02/23/2021    HDL 53 (L) 07/14/2020     Lab Results   Component Value Date    LDLCALC 110 (H) 02/23/2021    LDLCALC 113 (H) 07/14/2020     Lab Results   Component Value Date    TRIG 54 02/23/2021    TRIG 61 07/14/2020     Lab Results   Component Value Date    WBC 5.97 07/14/2020    HGB 13.5 07/14/2020     07/14/2020     Lab Results   Component Value Date    GLUCOSE 112 (H) 07/14/2020    CALCIUM 9.5 07/14/2020     07/14/2020    K 4.4 07/14/2020    CO2 27 07/14/2020     07/14/2020    BUN 14 11/04/2020    CREATININE 1.0 11/04/2020     Lab Results   Component Value Date    HGBA1C 5.7 (H) 07/14/2020     CrCl cannot be calculated (Patient's most recent lab result is older than the maximum 28 days allowed.).    Echocardiogram:  Cardiac Imaging    ECHOCARDIOGRAM - EXTERNAL RESULT     Narrative  Ordered by an unspecified provider.  January 2021: Ejection fraction 60%, diastolic dysfunction, calcified trileaflet valve with decreased motion but no stenosis, mild mitral regurgitation with minimal mitral prolapse, trace tricuspid regurgitation.      Stress Tests:             Vascular Studies:  No results found for this or any previous visit from the past 365 days.    No results found for this or any previous visit from the past 365 days.    No results found for this or any previous visit from the past 365 days.    No results found for this or any previous visit from the past 365 days.      Cardiac cath:      Peripheral:  No results found for this or any previous visit from the past 365 days.      ECG: Independently reviewed: SR, SHAN       Assessment and Plan      Ashleigh Peng is a 70 y.o. female seen today for the following  conditions:    Problem List Items Addressed This Visit        Circulatory    Diastolic dysfunction (Chronic)    Mitral regurgitation    Relevant Medications    rosuvastatin (CRESTOR) 20 mg tablet    Murmur       Endocrine/Metabolic    Hyperlipidemia (Chronic)    Relevant Medications    rosuvastatin (CRESTOR) 20 mg tablet      Other Visit Diagnoses     Coronary artery disease, unspecified vessel or lesion type, unspecified whether angina present, unspecified whether native or transplanted heart    -  Primary    Relevant Medications    rosuvastatin (CRESTOR) 20 mg tablet    Other Relevant Orders    ECG 12 LEAD-OFFICE PERFORMED (Completed)            Plan:    #1.  Hyperlipidemia  Her LDL remains higher than ideal while on high intensity statin and Zetia.  I have asked her to increase her Crestor to 40 mg daily and she is agreeable to this.  She is due for primary care follow-up in October and that seems like a good time to recheck her lipid panel with the rest of her routine blood work.    #2.  Valvular heart disease  The patient has mitral valve prolapse which is minimal along with mild mitral regurgitation.  She also has a calcified trileaflet aortic valve with decreased motion but no hemodynamic aortic stenosis.  She has a very mild outflow murmur from her aortic valve.    #3.  Tobacco abuse  Cessation encouraged      I will plan to see her again in about 6 months.    Available medical records were reviewed and updated where appropriate including laboratory data, imaging studies, and previous outpatient and inpatient records.  Counseling/education performed.      Thank you for allowing me to participate in the care of this patient.      Kun Guzman MD [facility]  7/21/2021  10:27 AM    [facility] Office: [phone]  [facility] [place]: [facility]     This document was generated utilizing voice recognition technology, typographical errors may be present.

## 2021-07-29 RX ORDER — ROSUVASTATIN CALCIUM 40 MG/1
40 TABLET, COATED ORAL DAILY
Qty: 90 TABLET | Refills: 3 | Status: SHIPPED | OUTPATIENT
Start: 2021-07-29 | End: 2022-10-05 | Stop reason: SDUPTHER

## 2021-07-29 RX ORDER — ROSUVASTATIN CALCIUM 20 MG/1
40 TABLET, COATED ORAL DAILY
Qty: 90 TABLET | Refills: 3 | Status: CANCELLED | OUTPATIENT
Start: 2021-07-29

## 2021-07-29 RX ORDER — EZETIMIBE 10 MG/1
10 TABLET ORAL DAILY
Qty: 90 TABLET | Refills: 3 | Status: SHIPPED | OUTPATIENT
Start: 2021-07-29 | End: 2022-10-05 | Stop reason: SDUPTHER

## 2021-10-13 ENCOUNTER — OFFICE VISIT (OUTPATIENT)
Dept: PRIMARY CARE | Facility: CLINIC | Age: 71
End: 2021-10-13
Payer: MEDICARE

## 2021-10-13 VITALS
RESPIRATION RATE: 16 BRPM | HEIGHT: 68 IN | OXYGEN SATURATION: 97 % | WEIGHT: 128 LBS | HEART RATE: 68 BPM | TEMPERATURE: 98.5 F | DIASTOLIC BLOOD PRESSURE: 72 MMHG | SYSTOLIC BLOOD PRESSURE: 120 MMHG | BODY MASS INDEX: 19.4 KG/M2

## 2021-10-13 DIAGNOSIS — C50.911 MALIGNANT NEOPLASM OF RIGHT FEMALE BREAST, UNSPECIFIED ESTROGEN RECEPTOR STATUS, UNSPECIFIED SITE OF BREAST (CMS/HCC): Chronic | ICD-10-CM

## 2021-10-13 DIAGNOSIS — E78.2 MIXED HYPERLIPIDEMIA: ICD-10-CM

## 2021-10-13 DIAGNOSIS — M85.852 OSTEOPENIA OF BOTH HIPS: Chronic | ICD-10-CM

## 2021-10-13 DIAGNOSIS — M81.0 OSTEOPOROSIS, UNSPECIFIED OSTEOPOROSIS TYPE, UNSPECIFIED PATHOLOGICAL FRACTURE PRESENCE: ICD-10-CM

## 2021-10-13 DIAGNOSIS — M85.851 OSTEOPENIA OF BOTH HIPS: Chronic | ICD-10-CM

## 2021-10-13 DIAGNOSIS — Z00.00 MEDICARE ANNUAL WELLNESS VISIT, SUBSEQUENT: Primary | ICD-10-CM

## 2021-10-13 DIAGNOSIS — M85.80 OSTEOPENIA, UNSPECIFIED LOCATION: ICD-10-CM

## 2021-10-13 PROCEDURE — G0439 PPPS, SUBSEQ VISIT: HCPCS | Performed by: FAMILY MEDICINE

## 2021-10-13 SDOH — HEALTH STABILITY: PHYSICAL HEALTH: ON AVERAGE, HOW MANY MINUTES DO YOU ENGAGE IN EXERCISE AT THIS LEVEL?: 0 MIN

## 2021-10-13 SDOH — HEALTH STABILITY: PHYSICAL HEALTH: ON AVERAGE, HOW MANY DAYS PER WEEK DO YOU ENGAGE IN MODERATE TO STRENUOUS EXERCISE (LIKE A BRISK WALK)?: 0 DAYS

## 2021-10-13 ASSESSMENT — ENCOUNTER SYMPTOMS
HEMATURIA: 0
JOINT SWELLING: 0
FATIGUE: 0
LIGHT-HEADEDNESS: 0
BRUISES/BLEEDS EASILY: 0
SHORTNESS OF BREATH: 0
DYSURIA: 0
ABDOMINAL PAIN: 0
EYE DISCHARGE: 0
NECK PAIN: 0
WEAKNESS: 0
TROUBLE SWALLOWING: 0
HEADACHES: 0
COUGH: 0
MYALGIAS: 0
BLOOD IN STOOL: 0
UNEXPECTED WEIGHT CHANGE: 0
CONSTIPATION: 0
NAUSEA: 0
WHEEZING: 0
EYE ITCHING: 0
DIZZINESS: 0
NUMBNESS: 0
CHEST TIGHTNESS: 0
FEVER: 0
SORE THROAT: 0
PALPITATIONS: 0
CHILLS: 0
ARTHRALGIAS: 0
APPETITE CHANGE: 0
SLEEP DISTURBANCE: 0
DIARRHEA: 0
NERVOUS/ANXIOUS: 0
VOMITING: 0
EYE PAIN: 0
SINUS PRESSURE: 0
DIFFICULTY URINATING: 0
DYSPHORIC MOOD: 0
FREQUENCY: 0
CONFUSION: 0
RHINORRHEA: 0
BACK PAIN: 0
EYE REDNESS: 0
SPEECH DIFFICULTY: 0

## 2021-10-13 ASSESSMENT — LIFESTYLE VARIABLES
HOW MANY STANDARD DRINKS CONTAINING ALCOHOL DO YOU HAVE ON A TYPICAL DAY: 1 OR 2
HOW OFTEN DO YOU HAVE SIX OR MORE DRINKS ON ONE OCCASION: NEVER
HOW OFTEN DO YOU HAVE A DRINK CONTAINING ALCOHOL: 2-3 TIMES A WEEK

## 2021-10-13 ASSESSMENT — MINI COG
TOTAL SCORE: 5
COMPLETED: YES

## 2021-10-13 NOTE — ASSESSMENT & PLAN NOTE
· The patient is currently stable.   · Bloodwork was ordered including a complete blood count, complete metabolic profile, thyroid stimulating hormone, and a lipid profile.   · Further recommendations will be provided to the patient based on the results of the blood tests.   · The patient should  exercise for 2.5 hours per week.   · All the recommend vaccinations are up to date.   · The patient should be evaluated by the ophthalmologist every year and dentist every 6 months.   · The patient was advised to protect the skin by applying suntan lotion containing an SPF > 30 every 2 hours while in sun or after swimming. Instructed to avoid prolonged direct sun exposure as much as possible.   · The patient's body mass index is normal.   · The patient's gynecological examination and mammogram are up to date.   · The patient's DEXA scan is due at this time.  · The patient's colonoscopy is up to date.   · Advised to consume 1000 mg of calcium daily through the diet. If the patient is unable to consume 1000 mg through the diet, then taking calcium supplements is an acceptable alternative. The patient was informed not to consume more than 500 mg of a calcium supplement at a time.

## 2021-10-13 NOTE — PATIENT INSTRUCTIONS
Women's Personalized Prevention Plan Services (PPPS)       Preventive Services Checklist (Assumes Average Risk Unless Otherwise Noted)  Preventive Service Target Population and Frequency Last Done Date Due   Abd Aortic Aneurysm Screening Family history of AAA (covered once)  Not needed    Alcohol Misuse Screening As necessary for those at risk (covered annually) 10/13/2021 10/2022   Breast Cancer Screening Mammogram every 1-2yrs 50-69yo; every 1-2yrs 35-50yo if patient desires after weighing potential harms and benefits (covered once 35-38yo, annually >=39yo) 10/05/2021 10/2022    Cholesterol Screening Both risk factors: 1) >=21yo and 2)  increased risk coronary artery disease (covered every 5 years) 02/23/2021 Ordered    Colorectal Cancer Screening >=49yo: Colonoscopy every 10 years, FIT annually or Cologuard every 3 years (up to 84yo for Cologuard) 11/08/2016 11/2026   Diabetes Screening Any 1 risk factor: hypertension, dyslipidemia, obesity, high glucose; or Any 2 risk factors: >=64 yo, overweight, family history diabetes, history gestational diabetes or delivery of infant >9lbs (covered every 6 months) 07/14/2020 Ordered    Glaucoma Screening Any 1 risk factor: 1) diabetes, 2) family history glaucoma, 3)  >=49yo, 4)  American >=64yo (covered annually) Spring 2021 Spring 2022   Hepatitis C Screening Any 1 risk factor: 1) born between 7495-3504, 2) blood transfusion before 1992, 3) current or past injection drug use (covered once for average risk, annually for high risk) 12/12/2018 Done    Lung Cancer Screening Low-dose chest CT if all 3 risk factors: 1) 55-78yo, 2) smoker or quit within last 15y, 3) >=30 pack years (covered annually)  Not needed    Osteoporosis Screening >=64yo (covered every 24 months)  <64yo if any 1 risk factor: 1) estrogen deficient and at risk for osteoporosis; 2) established osteoporosis on treatment; 3) x-rays show possible osteoporosis, osteopenia or vertebral  "fractures; 4) on steroid or planning to start; 5) primary hyperparathyroidism (covered as medically necessary) 10/17/2018 Ordered    Sexually Transmitted Diseases (STDs) As necessary chlamydia, gonorrhea, syphilis, hepatitis B (covered annually if not pregnant, more often in pregnancy)  HIV if any 1 risk factor present: 1) <16yo or >66yo and at increased risk, 2) 15-66yo and ask for it, or 3) pregnant (covered annually if not pregnant, up to 3x in pregnancy)  Low Risk    Vaccine: Hepatitis B As necessary if medium or high risk (series covered once)   Not needed     Vaccine: Influenza All patients without contraindications (covered annually.) 09/23/2021 Fall 2022   Vaccine: Pneumococcal Pneumovax + Prevnar (both covered, >=11 months apart) Prevnar   10/26/2015  Pneumovax  11/26/2019   Done    Vaccine: Shingrix (Shingles) >= 49yo without contraindications             (2 doses, 2 months apart) Zostavax  12/09/2015  Shingrix   04/12/2021  08/10/2021 Done    TDAP Every 10 years   04/12/2021 04/2031                                    Women's Personalized Prevention Plan Services (PPPS)                                                          Prints to AVS                                          Health Risk Factors and Interventions  \"x\" Indicates risk is associated with this patient Risk Factor Recommended Interventions    N/A   Overweight     N/A  Hypertension    N/A  Diabetes    N/A  Fall Risk    N/A  Tobacco Use               Problem List Items Addressed This Visit     Hyperlipidemia (Chronic)    Relevant Orders    CBC and Differential    Comprehensive metabolic panel    Lipid panel    TSH 3rd Generation    Malignant neoplasm of female breast (CMS/HCC) (Chronic)     Follow up with Dr. Solorzano         Medicare annual wellness visit, subsequent - Primary     · The patient is currently stable.   · Bloodwork was ordered including a complete blood count, complete metabolic profile, thyroid stimulating hormone, and a lipid " profile.   · Further recommendations will be provided to the patient based on the results of the blood tests.   · The patient should  exercise for 2.5 hours per week.   · All the recommend vaccinations are up to date.   · The patient should be evaluated by the ophthalmologist every year and dentist every 6 months.   · The patient was advised to protect the skin by applying suntan lotion containing an SPF > 30 every 2 hours while in sun or after swimming. Instructed to avoid prolonged direct sun exposure as much as possible.   · The patient's body mass index is normal.   · The patient's gynecological examination and mammogram are up to date.   · The patient's DEXA scan is due at this time.  · The patient's colonoscopy is up to date.   · Advised to consume 1000 mg of calcium daily through the diet. If the patient is unable to consume 1000 mg through the diet, then taking calcium supplements is an acceptable alternative. The patient was informed not to consume more than 500 mg of a calcium supplement at a time.            Osteopenia of both hips (Chronic)     Check DEXA scan            Other Visit Diagnoses     Osteopenia, unspecified location        Relevant Orders    DEXA BONE DENSITY    Osteoporosis, unspecified osteoporosis type, unspecified pathological fracture presence         Relevant Orders    DEXA BONE DENSITY              Health Risk Factors with Personalized Education:  ----------------------------------------------------------------------------------------------------------------------  Controlling Your Cholesterol  · Reduce the amount of saturated and trans fat in your diet.  Limit intake of red meat.  Consume only low-fat or non-fat/skim dairy.  Limit fried food.  Cook with vegetable oils.  · Reduce your intake of sugary foods, sugary drinks and alcohol.  · Eat a diet high in fruit, vegetables and whole grains.  · Get protein from fish, poultry and a small portion of nuts.  · Stay active.  Try to get at  least 90 to 150 minutes of exercise per week.  Try brisk walking, swimming, bicycling or dancing.  · Maintain a healthy weight by balancing your diet and exercise.  · If you have been prescribed medication, take it regularly and exactly as prescribed. Let your PCP know if you have any problems or questions about your medication.  · It’s important to know your cholesterol numbers.  When recommended by your PCP, get the cholesterol blood test.  ----------------------------------------------------------------------------------------------------------------------  Maintaining Strong Bones  · Try to get at least 90 to 150 minutes of weight-bearing exercise per week.  · Ensure intake of at least 1200mg of calcium per day.  Eat foods high in calcium like milk and other dairy, green vegetables, fruit, canned fish with soft and edible bones, nuts, calcium-set tofu.  Some foods are calcium-fortified, like bread, cereal, fruit juices and mineral water.  · Help your body make vitamin D by getting 10-15 minutes per day of sunlight.    · Ensure intake of at least 600IU of vitamin D per day.  Eat foods high in vitamin D like oily fish (salmon, sardines, mackerel) and eggs.  Some foods are fortified with vitamin D, like dairy and cereals.  · Avoid high amounts of caffeine and salt, since they can cause the body to loose calcium.  · Limit alcohol intake, since it is associated with weaker bones and is associated with falls and fractures.  · Limit intake of fizzy drinks.  ----------------------------------------------------------------------------------------------------------------------  Reducing Your Risk of Falls  · Tell your PCP if any of your medications make you feel tired, dizzy, lightheaded or off-balance.  · Maintain coordination, flexibility and balance by ensuring regular physical activity.  · Limit alcohol intake to 1 drink per day.  Consider avoiding all alcohol intake.  · Ensure good vision.  Visit an ophthalmologist  or optometrist regularly for vision screening or to make sure your glasses / contact lens prescription is correct.  If you need glasses or contacts, wear them.  When you get new glasses or contacts, take time to get used to them.  Do not wear sunglasses or tinted lenses when indoors.  · Ensure good hearing.  Have your hearing checked if you are having trouble hearing, or family and friends think you cannot hear them.  If you need a hearing aid, be sure it fits well and wear it.  · Get enough rest.  Ensure about 7-9 hours of sleep every day.  · Get up slowly from your bed or chairs.  Do not start walking until you are sure you feel steady.  · Wear non-skid, rubber-soled, low-heeled shoes.  Do not walk in socks, or in shoes and slippers with smooth soles.  · If your PCP or therapist recommends using a cane or walker, use it regularly.  · Make your home safer.  Increase lighting throughout the house, especially at the top and bottom of stairs.  Ensure lighting is easily turned on when getting up in the middle of the night.  Make sure there are two secure rails on all stairs.  Install grab bars in the bathtub / shower and near the toilet.  Consider using a shower chair and / or a hand-held shower.  · Spread sand or salt on icy surfaces.  Beware of wet surfaces, which can be icy.  · Tell your PCP if you have fallen.

## 2021-10-13 NOTE — PROGRESS NOTES
Subjective     Ashleigh Peng is a 71 y.o. female who presents for a subsequent annual wellness visit.     Patient Care Team:  Peter Solorzano MD as PCP - General (Family Medicine)  Pepe Jasmine MD as Consulting Physician (Obstetrics and Gynecology)  Kun Guzman MD as Consulting Physician (Cardiology)  Donis Patten MD as Consulting Physician (Ophthalmology)  Jon Solorzano MD as Consulting Physician (Oncology)  Matt Bacon MD as Consulting Physician (Dermatology)  Brady Schwab MD as Consulting Physician (Otolaryngology)    Comprehensive Medical and Social History  Patient Active Problem List   Diagnosis   • Hyperlipidemia   • Malignant neoplasm of female breast (CMS/HCC)   • Hyperglycemia   • Medicare annual wellness visit, subsequent   • Diastolic dysfunction   • Malignant neoplasm of female breast (CMS/HCC)   • Mitral regurgitation   • Murmur   • Osteopenia of both hips     Past Medical History:   Diagnosis Date   • Diastolic dysfunction 12/11/2019    Seen on echocardiogram in 12/2019. Has EF of 60%, mild mitral regurgitation and mild tricuspid regurgitation. Has minimally elevated transaortic gradient. Echocardiogram on 1/22/2021: Normal left ventricular systolic function without segmental wall motion abnormalities.  Ejection fraction 60%.  Abnormal diastolic function.  Calcified trileaflet aortic valve with decreased motion but no significant   • Hyperglycemia 12/12/2018   • Hyperlipidemia 10/26/2015    Cardiologist: Dr. Guzman. Managed with Rosuvastatin, Ezetimibe, and Lovaza.  Requires periodic monitoring of lipids and liver function tests.  Advised to follow low fat diet and to keep BMI < 25.  Advised to exercise for 2.5 hours per week.     • Malignant neoplasm of female breast (CMS/HCC) 3/1/2007    Oncologist: Dr. Jon Solorzano Surgeon: Dr. Bragg Right lumpectomy. Dx  BREAST CANCER, Dx Date 3/2007, Histology  , Stage @ Dx  T1,N0, ER/WA+ HERII+, Disease Status  SIN Treatment was  radiation and Arimidex x 5 years Managed with yearly mammograms.   • Osteopenia of both hips 10/13/2021    DEXA scan done in 10/2018. LS -0.1, LH -1.7, RH -1.5.  Managed with vitamin D3 and weight bearing exercise.      Past Surgical History:   Procedure Laterality Date   • BREAST LUMPECTOMY Right 2007    Dr. Bragg   • WISDOM TOOTH EXTRACTION       Allergies   Allergen Reactions   • No Known Allergies      Current Outpatient Medications   Medication Sig Dispense Refill   • aspirin 81 mg enteric coated tablet Take 81 mg by mouth daily.      • cholecalciferol, vitamin D3, 2,000 unit tablet Take 2,000 Units by mouth daily.     • cyanocobalamin (VITAMIN B12) 1,000 mcg tablet Take 1,000 mcg by mouth daily.     • ezetimibe (ZETIA) 10 mg tablet Take 1 tablet (10 mg total) by mouth daily. 90 tablet 3   • multivitamin-iron-folic acid  mg-mcg tablet Take 1 tablet by mouth daily.     • rosuvastatin (CRESTOR) 40 mg tablet Take 1 tablet (40 mg total) by mouth daily. 90 tablet 3   • ubidecarenone (COENZYME Q10 ORAL) Take 1 tablet by mouth daily.       No current facility-administered medications for this visit.     Social History     Tobacco Use   • Smoking status: Current Every Day Smoker     Packs/day: 0.50     Years: 51.00     Pack years: 25.50     Types: Cigarettes   • Smokeless tobacco: Current User   Vaping Use   • Vaping Use: Never used   Substance Use Topics   • Alcohol use: Yes     Alcohol/week: 2.0 - 5.0 standard drinks     Types: 2 - 5 Standard drinks or equivalent per week   • Drug use: Never     Family History   Problem Relation Age of Onset   • Kidney disease Biological Mother    • Heart disease Biological Mother    • Hyperlipidemia Biological Mother    • COPD Biological Father    • Other Biological Sister         Meningioma   • Hyperlipidemia Biological Brother    • Throat cancer Biological Brother    • Brain cancer Paternal Grandmother    • Lung cancer Paternal Grandfather    • No Known Problems Biological  "Brother      Review of Systems   Constitutional: Negative for appetite change, chills, fatigue, fever and unexpected weight change.   HENT: Negative for congestion, ear pain, hearing loss, nosebleeds, postnasal drip, rhinorrhea, sinus pressure, sneezing, sore throat and trouble swallowing.    Eyes: Negative for pain, discharge, redness, itching and visual disturbance.   Respiratory: Negative for cough, chest tightness, shortness of breath and wheezing.    Cardiovascular: Negative for chest pain, palpitations and leg swelling.   Gastrointestinal: Negative for abdominal pain, blood in stool, constipation, diarrhea, nausea and vomiting.   Endocrine: Negative for cold intolerance and heat intolerance.   Genitourinary: Negative for difficulty urinating, dysuria, frequency, hematuria, urgency, vaginal bleeding and vaginal discharge.   Musculoskeletal: Negative for arthralgias, back pain, gait problem, joint swelling, myalgias and neck pain.   Skin: Negative for rash.   Allergic/Immunologic: Negative for environmental allergies.   Neurological: Negative for dizziness, syncope, speech difficulty, weakness, light-headedness, numbness and headaches.   Hematological: Does not bruise/bleed easily.   Psychiatric/Behavioral: Negative for confusion, dysphoric mood and sleep disturbance. The patient is not nervous/anxious.        Objective   Vitals  Vitals:    10/13/21 0954   BP: 120/72   Pulse: 68   Resp: 16   Temp: 36.9 °C (98.5 °F)   TempSrc: Oral   SpO2: 97%   Weight: 58.1 kg (128 lb)  Comment: with shoes   Height: 1.727 m (5' 8\")  Comment: with shoes     Body mass index is 19.46 kg/m².    Wt Readings from Last 3 Encounters:   10/13/21 58.1 kg (128 lb)   11/09/20 60.8 kg (134 lb)   07/08/20 61.8 kg (136 lb 3.2 oz)     Physical Exam  Constitutional:       General: She is not in acute distress.     Appearance: Normal appearance. She is well-developed. She is not ill-appearing, toxic-appearing or diaphoretic.   HENT:      Head: " Normocephalic and atraumatic.      Right Ear: Tympanic membrane, ear canal and external ear normal.      Left Ear: Tympanic membrane, ear canal and external ear normal.      Nose: No mucosal edema, congestion or rhinorrhea.      Right Sinus: No maxillary sinus tenderness or frontal sinus tenderness.      Left Sinus: No maxillary sinus tenderness or frontal sinus tenderness.      Mouth/Throat:      Mouth: No oral lesions.      Pharynx: Uvula midline. No oropharyngeal exudate or posterior oropharyngeal erythema.      Tonsils: No tonsillar exudate.   Eyes:      General: Lids are normal. No scleral icterus.        Right eye: No discharge.         Left eye: No discharge.      Conjunctiva/sclera:      Right eye: Right conjunctiva is not injected. No exudate.     Left eye: Left conjunctiva is not injected. No exudate.     Pupils: Pupils are equal, round, and reactive to light.   Neck:      Thyroid: No thyroid mass or thyromegaly.      Vascular: No carotid bruit.   Cardiovascular:      Rate and Rhythm: Normal rate and regular rhythm.      Pulses:           Popliteal pulses are 2+ on the right side and 2+ on the left side.        Dorsalis pedis pulses are 2+ on the right side and 2+ on the left side.      Heart sounds: Normal heart sounds. No murmur heard.  No gallop. No S3 or S4 sounds.    Pulmonary:      Effort: Pulmonary effort is normal. No respiratory distress.      Breath sounds: Normal breath sounds. No decreased breath sounds, wheezing, rhonchi or rales.   Chest:      Chest wall: No tenderness.   Abdominal:      General: Bowel sounds are normal. There is no distension.      Palpations: Abdomen is soft.      Tenderness: There is no abdominal tenderness. There is no guarding or rebound.      Hernia: No hernia is present.   Musculoskeletal:      Cervical back: Normal range of motion and neck supple.      Right lower leg: No edema.      Left lower leg: No edema.   Lymphadenopathy:      Cervical: No cervical adenopathy.    Skin:     General: Skin is warm and dry.      Findings: No rash.   Neurological:      Mental Status: She is alert.   Psychiatric:         Mood and Affect: Mood normal.         Speech: Speech normal.         Behavior: Behavior normal.         Thought Content: Thought content normal.         Judgment: Judgment normal.           Advanced Care Plan  Does patient have advance directive?: Yes       Patient has Advance Directive: Patient has Advance Directive, has not brought in                             PHQ  Will the patient answer the depression questions?: No                                                         Mini Cog  Completed: Yes  Score: 5  Result: Negative      Get Up and Go  Result: Pass    STEADI Falls Risk  One or more falls in the last year: No           Has trouble stepping up onto a curb: No   Advised to use a cane or walker to get around safely: No   Often has to rush to the toilet: No   Feels unsteady when walking: No   Has lost some feeling in feet: No   Often feels sad or depressed: No   Steadies self on furniture while walking at home: No   Takes medication that makes him/her feel lightheaded or more tired than usual: No   Worried about falling: No   Takes medicine to sleep or improve mood: No   Needs to push with hands when rising from a chair: No   Falls screen completed: Yes       Immunization History   Administered Date(s) Administered   • Hepatitis A 01/15/2015, 06/06/2015   • INFLUENZA VACCINE QUAD ADJUVANTED 65 and OLDER 09/15/2020   • Influenza Split High Dose Preservative Free IM 10/26/2015   • Influenza Vaccine High Dose 65 And Older 10/01/2019, 09/23/2021   • Influenza Vaccine Quadrivalent 3 Yr And Older 01/01/2009, 01/01/2010, 01/01/2011, 01/01/2012, 01/01/2013, 01/01/2014   • Influenza Vaccine Quadrivalent Preservative Free 6-35 Months 10/31/2016   • Influenza, Unspecified 11/20/2013, 11/01/2016, 10/01/2018   • Pneumococcal Conjugate 13-Valent 10/26/2015   • Pneumococcal  Polysaccharide 10/12/2011, 11/26/2019   • Sars-cov-2 (Covid-19) Vaccine, Pfizer 02/12/2021, 03/13/2021   • Tdap 10/10/2010, 04/12/2021   • Zoster 12/09/2015   • Zoster Vaccine Recombinant Adjuvanted (Shingrix) 04/12/2021, 08/10/2021       Health Maintenance Topics with due status: Overdue       Topic Date Due    DEXA Scan 10/17/2020     Health Maintenance Topics with due status: Not Due       Topic Last Completion Date    Colonoscopy 11/08/2016    DTaP, Tdap, and Td Vaccines 04/12/2021    Mammogram 10/05/2021    Medicare Annual Wellness Visit 10/13/2021     Health Maintenance Topics with due status: Completed       Topic Last Completion Date    Hepatitis C Screening 12/12/2018    Pneumococcal 11/26/2019    Pneumococcal (65 years and older) 11/26/2019    COVID-19 Vaccine 03/13/2021    Zoster Vaccine 08/10/2021    Influenza Vaccine 09/23/2021    Annual Falls Risk Screening 10/13/2021     Health Maintenance Topics with due status: Aged Out       Topic Date Due    Meningococcal ACWY Aged Out    HIB Vaccines Aged Out    IPV Vaccines Aged Out    HPV Vaccines Aged Out       Lab Results   Component Value Date    WBC 5.97 07/14/2020    WBC 5.77 08/19/2019    WBC 6.70 07/31/2019    HGB 13.5 07/14/2020    HGB 12.5 08/19/2019    HGB 12.4 07/31/2019    HCT 41.0 07/14/2020    HCT 38.9 08/19/2019    HCT 39.0 07/31/2019    MCV 97.9 07/14/2020    MCV 96.8 08/19/2019    MCV 98.5 (H) 07/31/2019     07/14/2020     08/19/2019     07/31/2019       Lab Results   Component Value Date    GLUCOSE 112 (H) 07/14/2020    GLUCOSE 111 (H) 08/19/2019    GLUCOSE 92 07/31/2019    CALCIUM 9.5 07/14/2020    CALCIUM 9.4 08/19/2019    CALCIUM 9.3 07/31/2019     07/14/2020     08/19/2019     07/31/2019    K 4.4 07/14/2020    K 4.3 08/19/2019    K 4.2 07/31/2019    CO2 27 07/14/2020    CO2 27 08/19/2019    CO2 27 07/31/2019     07/14/2020     08/19/2019     07/31/2019    BUN 14 11/04/2020    BUN 13  07/14/2020    BUN 9 08/19/2019    CREATININE 1.0 11/04/2020    CREATININE 0.9 07/14/2020    CREATININE 0.9 08/19/2019       Lab Results   Component Value Date    ALT 19 07/14/2020    ALT 20 12/09/2019    ALT 13 08/19/2019    AST 23 07/14/2020    AST 23 12/09/2019    AST 20 08/19/2019    GGT 10 06/03/2015    ALKPHOS 51 07/14/2020    ALKPHOS 53 12/09/2019    ALKPHOS 64 08/19/2019    BILITOT 0.7 07/14/2020    BILITOT 0.5 12/09/2019    BILITOT 0.5 08/19/2019       Lab Results   Component Value Date    CHOL 189 02/23/2021    CHOL 178 07/14/2020    CHOL 175 12/09/2019     Lab Results   Component Value Date    HDL 68 02/23/2021    HDL 53 (L) 07/14/2020    HDL 58 12/09/2019     Lab Results   Component Value Date    LDLCALC 110 (H) 02/23/2021    LDLCALC 113 (H) 07/14/2020    LDLCALC 106 (H) 12/09/2019     Lab Results   Component Value Date    TRIG 54 02/23/2021    TRIG 61 07/14/2020    TRIG 57 12/09/2019       Lab Results   Component Value Date    TSH 1.59 07/14/2020    TSH 1.55 07/31/2019    TSH 1.84 12/12/2018       Lab Results   Component Value Date    HEPCAB Nonreactive 12/12/2018       Hearing and Vision Screening  No exam data present  See HRA for relevant hearing screening response.    Assessment/Plan   Diagnoses and all orders for this visit:    Medicare annual wellness visit, subsequent (Primary)  Assessment & Plan:  · The patient is currently stable.   · Bloodwork was ordered including a complete blood count, complete metabolic profile, thyroid stimulating hormone, and a lipid profile.   · Further recommendations will be provided to the patient based on the results of the blood tests.   · The patient should  exercise for 2.5 hours per week.   · All the recommend vaccinations are up to date.   · The patient should be evaluated by the ophthalmologist every year and dentist every 6 months.   · The patient was advised to protect the skin by applying suntan lotion containing an SPF > 30 every 2 hours while in sun or  after swimming. Instructed to avoid prolonged direct sun exposure as much as possible.   · The patient's body mass index is normal.   · The patient's gynecological examination and mammogram are up to date.   · The patient's DEXA scan is due at this time.  · The patient's colonoscopy is up to date.   · Advised to consume 1000 mg of calcium daily through the diet. If the patient is unable to consume 1000 mg through the diet, then taking calcium supplements is an acceptable alternative. The patient was informed not to consume more than 500 mg of a calcium supplement at a time.         Mixed hyperlipidemia  -     CBC and Differential; Future  -     Comprehensive metabolic panel; Future  -     Lipid panel; Future  -     TSH 3rd Generation; Future    Osteopenia, unspecified location  -     DEXA BONE DENSITY; Future    Osteoporosis, unspecified osteoporosis type, unspecified pathological fracture presence   -     DEXA BONE DENSITY; Future    Malignant neoplasm of right female breast, unspecified estrogen receptor status, unspecified site of breast (CMS/[facility])  Assessment & Plan:  Follow up with Dr. Solorzano      Osteopenia of both hips  Assessment & Plan:  Check DEXA scan         See Patient Instructions (the written plan) which was given to the patient for PPPS and health risk factors with interventions.

## 2021-10-20 ENCOUNTER — DOCUMENTATION (OUTPATIENT)
Dept: PRIMARY CARE | Facility: CLINIC | Age: 71
End: 2021-10-20

## 2021-10-20 NOTE — PROGRESS NOTES
Coding Clarification ([facility]) - [facility]  Note       DATE: 10/20/2021    RE: Ashleigh Lundyyohannes  : 1950      Under the direction of Peter Solorzano MD, the following adjustments were made to this patients Problem List:      Specified Code: C50.911 Code Description: Malignant neoplasm of unspecified site of right female breast   Clarification Type EMR System Encounter Type Date of Service Provider   Code Specificity Epic Progress Notes 2020 PETER SOLORZANO   Rationale:  Claims data indicate Dr PETER SOLORZANO  ,C50.911 In association with clinical care rendered on DOS 2020. No Clinical data available for review by .Higher specificity for C50.919 from problem list is available to be C50.911       Original Code: C50.919

## 2021-10-27 ENCOUNTER — APPOINTMENT (OUTPATIENT)
Dept: LAB | Facility: CLINIC | Age: 71
End: 2021-10-27
Attending: FAMILY MEDICINE
Payer: MEDICARE

## 2021-10-27 DIAGNOSIS — E78.2 MIXED HYPERLIPIDEMIA: ICD-10-CM

## 2021-10-27 LAB
ALBUMIN SERPL-MCNC: 4.1 G/DL (ref 3.4–5)
ALP SERPL-CCNC: 53 IU/L (ref 35–126)
ALT SERPL-CCNC: 26 IU/L (ref 11–54)
ANION GAP SERPL CALC-SCNC: 14 MEQ/L (ref 3–15)
AST SERPL-CCNC: 26 IU/L (ref 15–41)
BASOPHILS # BLD: 0.05 K/UL (ref 0.01–0.1)
BASOPHILS NFR BLD: 0.8 %
BILIRUB SERPL-MCNC: 0.8 MG/DL (ref 0.3–1.2)
BUN SERPL-MCNC: 13 MG/DL (ref 8–20)
CALCIUM SERPL-MCNC: 9.7 MG/DL (ref 8.9–10.3)
CHLORIDE SERPL-SCNC: 104 MEQ/L (ref 98–109)
CHOLEST SERPL-MCNC: 193 MG/DL
CO2 SERPL-SCNC: 24 MEQ/L (ref 22–32)
CREAT SERPL-MCNC: 0.9 MG/DL (ref 0.6–1.1)
DIFFERENTIAL METHOD BLD: ABNORMAL
EOSINOPHIL # BLD: 0.17 K/UL (ref 0.04–0.36)
EOSINOPHIL NFR BLD: 2.6 %
ERYTHROCYTE [DISTWIDTH] IN BLOOD BY AUTOMATED COUNT: 13.1 % (ref 11.7–14.4)
GFR SERPL CREATININE-BSD FRML MDRD: >60 ML/MIN/1.73M*2
GLUCOSE SERPL-MCNC: 113 MG/DL (ref 70–99)
HCT VFR BLDCO AUTO: 43.4 % (ref 35–45)
HDLC SERPL-MCNC: 63 MG/DL
HDLC SERPL: 3.1 {RATIO}
HGB BLD-MCNC: 14.1 G/DL (ref 11.8–15.7)
IMM GRANULOCYTES # BLD AUTO: 0.01 K/UL (ref 0–0.08)
IMM GRANULOCYTES NFR BLD AUTO: 0.2 %
LDLC SERPL CALC-MCNC: 117 MG/DL
LYMPHOCYTES # BLD: 2.03 K/UL (ref 1.2–3.5)
LYMPHOCYTES NFR BLD: 31.1 %
MCH RBC QN AUTO: 32.5 PG (ref 28–33.2)
MCHC RBC AUTO-ENTMCNC: 32.5 G/DL (ref 32.2–35.5)
MCV RBC AUTO: 100 FL (ref 83–98)
MONOCYTES # BLD: 0.43 K/UL (ref 0.28–0.8)
MONOCYTES NFR BLD: 6.6 %
NEUTROPHILS # BLD: 3.83 K/UL (ref 1.7–7)
NEUTS SEG NFR BLD: 58.7 %
NONHDLC SERPL-MCNC: 130 MG/DL
NRBC BLD-RTO: 0 %
PDW BLD AUTO: 8.7 FL (ref 9.4–12.3)
PLATELET # BLD AUTO: 297 K/UL (ref 150–369)
POTASSIUM SERPL-SCNC: 4.4 MEQ/L (ref 3.6–5.1)
PROT SERPL-MCNC: 6.6 G/DL (ref 6–8.2)
RBC # BLD AUTO: 4.34 M/UL (ref 3.93–5.22)
SODIUM SERPL-SCNC: 142 MEQ/L (ref 136–144)
TRIGL SERPL-MCNC: 65 MG/DL (ref 30–149)
TSH SERPL DL<=0.05 MIU/L-ACNC: 1.74 MIU/L (ref 0.34–5.6)
WBC # BLD AUTO: 6.52 K/UL (ref 3.8–10.5)

## 2021-10-27 PROCEDURE — 84443 ASSAY THYROID STIM HORMONE: CPT

## 2021-10-27 PROCEDURE — 36415 COLL VENOUS BLD VENIPUNCTURE: CPT

## 2021-10-27 PROCEDURE — 80053 COMPREHEN METABOLIC PANEL: CPT

## 2021-10-27 PROCEDURE — 85025 COMPLETE CBC W/AUTO DIFF WBC: CPT

## 2021-10-27 PROCEDURE — 80061 LIPID PANEL: CPT

## 2021-10-28 ENCOUNTER — DOCUMENTATION (OUTPATIENT)
Dept: PRIMARY CARE | Facility: CLINIC | Age: 71
End: 2021-10-28

## 2021-10-28 NOTE — PROGRESS NOTES
Patient notified of blood work results.  Has impaired fasting glucose.  Patient advised to follow a low carbohydrate diet and low concentrated sweet diet.  Advised to exercise 2.5 hours per week.  Advised to reduce body mass index to less than 25.  Requires periodic monitoring of A1C.  Lipids are stable.  Continue same dose of lipid-lowering medications.  We will periodically monitor.  No other new changes made at this time.

## 2021-12-08 ENCOUNTER — HOSPITAL ENCOUNTER (OUTPATIENT)
Dept: RADIOLOGY | Facility: CLINIC | Age: 71
Discharge: HOME | End: 2021-12-08
Attending: FAMILY MEDICINE
Payer: MEDICARE

## 2021-12-08 ENCOUNTER — DOCUMENTATION (OUTPATIENT)
Dept: PRIMARY CARE | Facility: CLINIC | Age: 71
End: 2021-12-08
Payer: MEDICARE

## 2021-12-08 DIAGNOSIS — M81.0 OSTEOPOROSIS, UNSPECIFIED OSTEOPOROSIS TYPE, UNSPECIFIED PATHOLOGICAL FRACTURE PRESENCE: ICD-10-CM

## 2021-12-08 DIAGNOSIS — M85.80 OSTEOPENIA, UNSPECIFIED LOCATION: ICD-10-CM

## 2021-12-08 PROCEDURE — 77080 DXA BONE DENSITY AXIAL: CPT

## 2021-12-08 NOTE — RESULT ENCOUNTER NOTE
Patient notified of DEXA scan results.  Slightly worse levels in the right and left hip.  Still does not qualify for medication at this time.  Recommended weightbearing exercises, calcium and vitamin D.  We will recheck DEXA scan in 2 years

## 2021-12-17 NOTE — PROGRESS NOTES
"   [facility]  [place] Office       Patient ID: Ashleigh Peng 71 y.o. female 1950  PCP: Peter Solorzano MD      HPI     Ashleigh Peng is a 71 y.o. female I the pleasure seen in the office today for routine cardiovascular follow-up.    She has a background of hyperlipidemia, mild mitral valve prolapse and regurgitation, tobacco abuse and prior breast cancer.    I last saw her in July at which time she was doing well although she was still smoking about 10 cigarettes a day.  I asked her to increase her Crestor to 40 mg daily, she was due for fasting lipids in October, her LDL actually increased from 110->117    She tells me that since her last visit there have not been any particular changes in her medical issues. She is still smoking the same amount per day. She tells me that over the summer she was not great about remembering to take her medications all the time and was not eating all that well so she thinks that perhaps this could have had something to do with her worsened cholesterol levels. She would like to get her blood work rechecked and also requests hemoglobin A1c be checked along with it. She is not having any chest pain, shortness of breath, palpitations, syncope, lower extremity edema, or claudication.     Medications     Current Outpatient Medications   Medication Instructions   • aspirin 81 mg, oral, Daily   • cholecalciferol (vitamin D3) 2,000 Units, oral, Daily   • cyanocobalamin (VITAMIN B12) 1,000 mcg, oral, Daily   • ezetimibe (ZETIA) 10 mg, oral, Daily   • multivitamin-iron-folic acid  mg-mcg tablet 1 tablet, oral, Daily   • rosuvastatin (CRESTOR) 40 mg, oral, Daily   • ubidecarenone (COENZYME Q10 ORAL) 1 tablet, oral, Daily         Allergies     No known allergies     Vital Signs/Exam     Vitals:    12/21/21 0952   BP: 110/76   Pulse: 80   SpO2: 98%   Weight: 59 kg (130 lb)   Height: 1.727 m (5' 8\")     BP Readings from Last 3 Encounters:   12/21/21 110/76   10/13/21 120/72 "   07/21/21 119/80     Wt Readings from Last 3 Encounters:   12/21/21 59 kg (130 lb)   10/13/21 58.1 kg (128 lb)   11/09/20 60.8 kg (134 lb)        Gen: no distress  HEENT: no scleral icterus  Neck: no JVD, no carotid bruit  Lungs: clear bilaterally; no crackles, rhonchi, wheezing  Heart/Chest: regular rate and rhythm; very soft systolic ejection murmur heard at right upper sternal border  Extremities: no edema  Neuro: nonfocal, alert and oriented  Psych: normal mood and affect     Diagnostic Data   Diagnostic data personally reviewed. Available medical records were reviewed and updated where appropriate including laboratory data, imaging studies, and previous outpatient and inpatient records.  Counseling/education performed.      Labs:  Lab Results   Component Value Date    WBC 6.52 10/27/2021    HGB 14.1 10/27/2021     10/27/2021    ALT 26 10/27/2021    AST 26 10/27/2021     10/27/2021    K 4.4 10/27/2021     10/27/2021    CREATININE 0.9 10/27/2021    BUN 13 10/27/2021    CO2 24 10/27/2021    TSH 1.74 10/27/2021    HGBA1C 5.7 (H) 07/14/2020       Lab Results   Component Value Date    CHOL 193 10/27/2021    CHOL 189 02/23/2021    LDLCALC 117 (H) 10/27/2021    LDLCALC 110 (H) 02/23/2021    HDL 63 10/27/2021    HDL 68 02/23/2021    TRIG 65 10/27/2021    TRIG 54 02/23/2021       Echocardiogram:  1/22/2021  LV systolic function without segmental wall motion normalities, EF 60%  Abnormal diastolic function  Calcified leaflet aortic valve with decreased motion but no hemodynamically significant stenosis  Mildly thickened anterior mitral valve leaflet with minimal mitral valve prolapse and mild MR, trace TR, estimated PASP of 26    Stress Tests:   Stress Myoview January 2019  Negative for ischemia and scar next normal LV wall motion EF in size  Stress EKG negative for ischemia  Good exercise tolerance          Cardiac cath:      Vascular Studies:  No results found for this or any previous visit from the  past 365 days.    2016 carotid ultrasound normal      Peripheral:  No results found for this or any previous visit from the past 365 days.      Cardiac Monitor:     ECG independently reviewed: NSR, LAD     Assessment and Plan        Ashleigh Peng is a 71 y.o. female who presents today for office follow-up.    #1.  Hyperlipidemia  Her LDL remains higher than would be expected or ideal while on high intensity statin and Zetia. She is now consistently taking Crestor 40 mg daily as well as Zetia. She would like to get her blood work rechecked before you make any additional decisions and I think that this is reasonable. She does not have any clearly documented ASCVD but certainly I would want her LDL at least under 100 while on such intensive therapy. I did discuss with her that additional lipid lowering options would include a PCSK9 inhibitor or the new oral medication Nexletol. I think that Nexletol would be a reasonable next step but I told her I am concerned that insurance may not approve it without clearly documented ASCVD.    #2.  Valvular heart disease  The patient has mitral valve prolapse which is minimal along with mild mitral regurgitation.  She also has a calcified trileaflet aortic valve with decreased motion but no hemodynamic aortic stenosis.  She has a very mild outflow murmur from her aortic valve.     #3.  Tobacco abuse   Cessation encouraged         I will plan to see her again in about 6 months.     Available medical records were reviewed and updated where appropriate including laboratory data, imaging studies, and previous outpatient and inpatient records.  Counseling/education performed.       Thank you for allowing me to participate in the care of this patient.    Kun Guzman MD     [facility] Office: [phone]  [facility] [place]: [facility]   12/21/2021

## 2021-12-21 ENCOUNTER — OFFICE VISIT (OUTPATIENT)
Dept: CARDIOLOGY | Facility: CLINIC | Age: 71
End: 2021-12-21
Payer: MEDICARE

## 2021-12-21 VITALS
HEART RATE: 80 BPM | SYSTOLIC BLOOD PRESSURE: 110 MMHG | OXYGEN SATURATION: 98 % | HEIGHT: 68 IN | BODY MASS INDEX: 19.7 KG/M2 | WEIGHT: 130 LBS | DIASTOLIC BLOOD PRESSURE: 76 MMHG

## 2021-12-21 DIAGNOSIS — E78.2 MIXED HYPERLIPIDEMIA: ICD-10-CM

## 2021-12-21 DIAGNOSIS — I25.10 CORONARY ARTERY DISEASE, UNSPECIFIED VESSEL OR LESION TYPE, UNSPECIFIED WHETHER ANGINA PRESENT, UNSPECIFIED WHETHER NATIVE OR TRANSPLANTED HEART: Primary | ICD-10-CM

## 2021-12-21 DIAGNOSIS — R73.9 HYPERGLYCEMIA, UNSPECIFIED: ICD-10-CM

## 2021-12-21 DIAGNOSIS — R01.1 MURMUR: ICD-10-CM

## 2021-12-21 DIAGNOSIS — I34.0 NONRHEUMATIC MITRAL VALVE REGURGITATION: ICD-10-CM

## 2021-12-21 PROBLEM — H91.20 SUDDEN HEARING LOSS: Status: ACTIVE | Noted: 2021-12-21

## 2021-12-21 PROCEDURE — 93000 ELECTROCARDIOGRAM COMPLETE: CPT | Performed by: INTERNAL MEDICINE

## 2021-12-21 PROCEDURE — 99214 OFFICE O/P EST MOD 30 MIN: CPT | Performed by: INTERNAL MEDICINE

## 2021-12-21 NOTE — LETTER
December 21, 2021     Peter Solorzano MD  [address]  [address]  [facility] [address]    Patient: Ashleigh Peng  YOB: 1950  Date of Visit: 12/21/2021      Dear Dr. Solorzano:    Thank you for referring Ashleigh Peng to me for evaluation. Below are my notes for this consultation.    If you have questions, please do not hesitate to call me. I look forward to following your patient along with you.         Sincerely,        Kun Guzman MD        CC: No Recipients  Kun Guzman MD  12/21/2021 10:31 AM  Signed     [facility] Cardiology  [place] Office       Patient ID: Ashleigh Peng 71 y.o. female 1950  PCP: Peter Solorzano MD      HPI     Ashleigh Peng is a 71 y.o. female I the pleasure seen in the office today for routine cardiovascular follow-up.    She has a background of hyperlipidemia, mild mitral valve prolapse and regurgitation, tobacco abuse and prior breast cancer.    I last saw her in July at which time she was doing well although she was still smoking about 10 cigarettes a day.  I asked her to increase her Crestor to 40 mg daily, she was due for fasting lipids in October, her LDL actually increased from 110->117    She tells me that since her last visit there have not been any particular changes in her medical issues. She is still smoking the same amount per day. She tells me that over the summer she was not great about remembering to take her medications all the time and was not eating all that well so she thinks that perhaps this could have had something to do with her worsened cholesterol levels. She would like to get her blood work rechecked and also requests hemoglobin A1c be checked along with it. She is not having any chest pain, shortness of breath, palpitations, syncope, lower extremity edema, or claudication.     Medications     Current Outpatient Medications   Medication Instructions   • aspirin 81 mg, oral, Daily   • cholecalciferol (vitamin D3) 2,000 Units,  "oral, Daily   • cyanocobalamin (VITAMIN B12) 1,000 mcg, oral, Daily   • ezetimibe (ZETIA) 10 mg, oral, Daily   • multivitamin-iron-folic acid  mg-mcg tablet 1 tablet, oral, Daily   • rosuvastatin (CRESTOR) 40 mg, oral, Daily   • ubidecarenone (COENZYME Q10 ORAL) 1 tablet, oral, Daily         Allergies     No known allergies     Vital Signs/Exam     Vitals:    12/21/21 0952   BP: 110/76   Pulse: 80   SpO2: 98%   Weight: 59 kg (130 lb)   Height: 1.727 m (5' 8\")     BP Readings from Last 3 Encounters:   12/21/21 110/76   10/13/21 120/72   07/21/21 119/80     Wt Readings from Last 3 Encounters:   12/21/21 59 kg (130 lb)   10/13/21 58.1 kg (128 lb)   11/09/20 60.8 kg (134 lb)        Gen: no distress  HEENT: no scleral icterus  Neck: no JVD, no carotid bruit  Lungs: clear bilaterally; no crackles, rhonchi, wheezing  Heart/Chest: regular rate and rhythm; very soft systolic ejection murmur heard at right upper sternal border  Extremities: no edema  Neuro: nonfocal, alert and oriented  Psych: normal mood and affect     Diagnostic Data   Diagnostic data personally reviewed. Available medical records were reviewed and updated where appropriate including laboratory data, imaging studies, and previous outpatient and inpatient records.  Counseling/education performed.      Labs:  Lab Results   Component Value Date    WBC 6.52 10/27/2021    HGB 14.1 10/27/2021     10/27/2021    ALT 26 10/27/2021    AST 26 10/27/2021     10/27/2021    K 4.4 10/27/2021     10/27/2021    CREATININE 0.9 10/27/2021    BUN 13 10/27/2021    CO2 24 10/27/2021    TSH 1.74 10/27/2021    HGBA1C 5.7 (H) 07/14/2020       Lab Results   Component Value Date    CHOL 193 10/27/2021    CHOL 189 02/23/2021    LDLCALC 117 (H) 10/27/2021    LDLCALC 110 (H) 02/23/2021    HDL 63 10/27/2021    HDL 68 02/23/2021    TRIG 65 10/27/2021    TRIG 54 02/23/2021       Echocardiogram:  1/22/2021  LV systolic function without segmental wall motion " normalities, EF 60%  Abnormal diastolic function  Calcified leaflet aortic valve with decreased motion but no hemodynamically significant stenosis  Mildly thickened anterior mitral valve leaflet with minimal mitral valve prolapse and mild MR, trace TR, estimated PASP of 26    Stress Tests:   Stress Myoview January 2019  Negative for ischemia and scar next normal LV wall motion EF in size  Stress EKG negative for ischemia  Good exercise tolerance          Cardiac cath:      Vascular Studies:  No results found for this or any previous visit from the past 365 days.    2016 carotid ultrasound normal      Peripheral:  No results found for this or any previous visit from the past 365 days.      Cardiac Monitor:     ECG independently reviewed: NSR, LAD     Assessment and Plan        Ashleigh Peng is a 71 y.o. female who presents today for office follow-up.    #1.  Hyperlipidemia  Her LDL remains higher than would be expected or ideal while on high intensity statin and Zetia. She is now consistently taking Crestor 40 mg daily as well as Zetia. She would like to get her blood work rechecked before you make any additional decisions and I think that this is reasonable. She does not have any clearly documented ASCVD but certainly I would want her LDL at least under 100 while on such intensive therapy. I did discuss with her that additional lipid lowering options would include a PCSK9 inhibitor or the new oral medication Nexletol. I think that Nexletol would be a reasonable next step but I told her I am concerned that insurance may not approve it without clearly documented ASCVD.    #2.  Valvular heart disease  The patient has mitral valve prolapse which is minimal along with mild mitral regurgitation.  She also has a calcified trileaflet aortic valve with decreased motion but no hemodynamic aortic stenosis.  She has a very mild outflow murmur from her aortic valve.     #3.  Tobacco abuse   Cessation encouraged         I will  plan to see her again in about 6 months.     Available medical records were reviewed and updated where appropriate including laboratory data, imaging studies, and previous outpatient and inpatient records.  Counseling/education performed.       Thank you for allowing me to participate in the care of this patient.    Kun Guzman MD     [facility] [place] Office: [phone]  [facility]: [facility]   12/21/2021

## 2021-12-31 ENCOUNTER — APPOINTMENT (OUTPATIENT)
Dept: LAB | Facility: CLINIC | Age: 71
End: 2021-12-31
Attending: INTERNAL MEDICINE
Payer: MEDICARE

## 2021-12-31 DIAGNOSIS — E78.2 MIXED HYPERLIPIDEMIA: ICD-10-CM

## 2021-12-31 DIAGNOSIS — R73.9 HYPERGLYCEMIA, UNSPECIFIED: ICD-10-CM

## 2021-12-31 LAB
CHOLEST SERPL-MCNC: 168 MG/DL
EST. AVERAGE GLUCOSE BLD GHB EST-MCNC: 117 MG/DL
HBA1C MFR BLD HPLC: 5.7 %
HDLC SERPL-MCNC: 61 MG/DL
HDLC SERPL: 2.8 {RATIO}
LDLC SERPL CALC-MCNC: 97 MG/DL
NONHDLC SERPL-MCNC: 107 MG/DL
TRIGL SERPL-MCNC: 48 MG/DL (ref 30–149)

## 2021-12-31 PROCEDURE — 36415 COLL VENOUS BLD VENIPUNCTURE: CPT

## 2021-12-31 PROCEDURE — 80061 LIPID PANEL: CPT

## 2021-12-31 PROCEDURE — 83036 HEMOGLOBIN GLYCOSYLATED A1C: CPT

## 2022-01-06 ENCOUNTER — TELEPHONE (OUTPATIENT)
Dept: CARDIOLOGY | Facility: CLINIC | Age: 72
End: 2022-01-06
Payer: MEDICARE

## 2022-01-06 NOTE — TELEPHONE ENCOUNTER
Lipids do look improved. Let's leave meds as they are and follow up as planned Spoke with patient.

## 2022-01-06 NOTE — TELEPHONE ENCOUNTER
----- Message from Kun Guzman MD sent at 1/5/2022  5:50 PM EST -----  Lipids do look improved. Let's leave meds as they are and follow up as planned.

## 2022-06-21 ENCOUNTER — OFFICE VISIT (OUTPATIENT)
Dept: CARDIOLOGY | Facility: CLINIC | Age: 72
End: 2022-06-21
Payer: MEDICARE

## 2022-06-21 VITALS
OXYGEN SATURATION: 97 % | SYSTOLIC BLOOD PRESSURE: 102 MMHG | HEIGHT: 68 IN | BODY MASS INDEX: 19.85 KG/M2 | WEIGHT: 131 LBS | DIASTOLIC BLOOD PRESSURE: 70 MMHG | HEART RATE: 78 BPM

## 2022-06-21 DIAGNOSIS — E78.2 MIXED HYPERLIPIDEMIA: Primary | ICD-10-CM

## 2022-06-21 PROCEDURE — 93000 ELECTROCARDIOGRAM COMPLETE: CPT | Performed by: INTERNAL MEDICINE

## 2022-06-21 PROCEDURE — 99214 OFFICE O/P EST MOD 30 MIN: CPT | Performed by: INTERNAL MEDICINE

## 2022-06-21 NOTE — LETTER
June 21, 2022     Peter Solorzano MD  [address]  [address]  [facility] [address]    Patient: Ashleigh Peng  YOB: 1950  Date of Visit: 6/21/2022      Dear Dr. Solorzano:    Thank you for referring Ashleigh Peng to me for evaluation. Below are my notes for this consultation.    If you have questions, please do not hesitate to call me. I look forward to following your patient along with you.         Sincerely,        uKn Guzman MD        CC: No Recipients  Kun Guzman MD  6/21/2022 10:24 AM  Signed     [facility] Cardiology  [place] Office       Patient ID: Ashleigh Peng 71 y.o. female 1950  PCP: Peter Solorzano MD      HPI     Ashleigh Peng is a 71 y.o. female I the pleasure seen in the office today for routine cardiovascular follow-up.    She has a background of hyperlipidemia, mild mitral valve prolapse and regurgitation, tobacco abuse and prior breast cancer.    I last saw her about 6 months ago.  She has been on Crestor and Zetia and got follow-up lipid panel shortly after her last visit showing improvement in LDL, now under 100.  She otherwise has been doing well.  She does not have any other changes to her medications or significant new issues.  She denies chest pain, shortness of breath, palpitations, syncope, lower extremity edema, or claudication.  She is unfortunately still smoking.  She does not exercise formally but she is quite active and feels well with activity.     Medications     Current Outpatient Medications   Medication Instructions   • aspirin 81 mg, oral, Daily   • cholecalciferol (vitamin D3) 2,000 Units, oral, Daily   • cyanocobalamin (VITAMIN B12) 1,000 mcg, oral, Daily   • ezetimibe (ZETIA) 10 mg, oral, Daily   • multivitamin-iron-folic acid  mg-mcg tablet 1 tablet, oral, Daily   • rosuvastatin (CRESTOR) 40 mg, oral, Daily   • ubidecarenone (COENZYME Q10 ORAL) 1 tablet, oral, Daily         Allergies     No known allergies     Vital Signs/Exam  "    Vitals:    06/21/22 1001   BP: 102/70   BP Location: Left upper arm   Patient Position: Sitting   Pulse: 78   SpO2: 97%   Weight: 59.4 kg (131 lb)   Height: 1.727 m (5' 8\")     BP Readings from Last 3 Encounters:   06/21/22 102/70   12/21/21 110/76   10/13/21 120/72     Wt Readings from Last 3 Encounters:   06/21/22 59.4 kg (131 lb)   12/21/21 59 kg (130 lb)   10/13/21 58.1 kg (128 lb)        Gen: no distress  HEENT: no scleral icterus  Neck: no JVD, no carotid bruit  Lungs: clear bilaterally; no crackles, rhonchi, wheezing  Heart/Chest: regular rate and rhythm; very soft systolic ejection murmur heard at right upper sternal border  Extremities: no edema  Neuro: nonfocal, alert and oriented  Psych: normal mood and affect     Diagnostic Data   Diagnostic data personally reviewed. Available medical records were reviewed and updated where appropriate including laboratory data, imaging studies, and previous outpatient and inpatient records.  Counseling/education performed.      Labs:  Lab Results   Component Value Date    WBC 6.52 10/27/2021    HGB 14.1 10/27/2021     10/27/2021    ALT 26 10/27/2021    AST 26 10/27/2021     10/27/2021    K 4.4 10/27/2021     10/27/2021    CREATININE 0.9 10/27/2021    BUN 13 10/27/2021    CO2 24 10/27/2021    TSH 1.74 10/27/2021    HGBA1C 5.7 (H) 12/31/2021       Lab Results   Component Value Date    CHOL 168 12/31/2021    CHOL 193 10/27/2021    LDLCALC 97 12/31/2021    LDLCALC 117 (H) 10/27/2021    HDL 61 12/31/2021    HDL 63 10/27/2021    TRIG 48 12/31/2021    TRIG 65 10/27/2021       Echocardiogram:  1/22/2021  LV systolic function without segmental wall motion normalities, EF 60%  Abnormal diastolic function  Calcified leaflet aortic valve with decreased motion but no hemodynamically significant stenosis  Mildly thickened anterior mitral valve leaflet with minimal mitral valve prolapse and mild MR, trace TR, estimated PASP of 26    Stress Tests:   Stress " Myoview January 2019  Negative for ischemia and scar next normal LV wall motion EF in size  Stress EKG negative for ischemia  Good exercise tolerance          Cardiac cath:      Vascular Studies:  No results found for this or any previous visit from the past 365 days.    2016 carotid ultrasound normal      Peripheral:  No results found for this or any previous visit from the past 365 days.      Cardiac Monitor:     ECG independently reviewed: NSR, LAD     Assessment and Plan        Ashleigh Peng is a 71 y.o. female who presents today for office follow-up.    #1.  Hyperlipidemia  Her LDL remains higher than would be expected or ideal while on high intensity statin and Zetia. She is now consistently taking Crestor 40 mg daily as well as Zetia.  -LDL now 97.  For now this seems to be reasonably well at goal and I would like it to at least be under 100.  Next steps would be addition of PCSK9 inhibitor or Nexletol.  She does not have clearly documented ASCVD so I think that insurance approval on either of these medications might be challenging.  For now I think it is fine to continue on her current doses and we will plan to recheck a lipid panel prior to her upcoming visit in 6 months.     #2.  Valvular heart disease   The patient has mitral valve prolapse which is minimal along with mild mitral regurgitation.  She also has a calcified trileaflet aortic valve with decreased motion but no hemodynamic aortic stenosis.  She has a very mild outflow murmur from her aortic valve.     #3.  Tobacco abuse   Cessation encouraged         I will plan to see her again in about 6 months.     Available medical records were reviewed and updated where appropriate including laboratory data, imaging studies, and previous outpatient and inpatient records.  Counseling/education performed.       Thank you for allowing me to participate in the care of this patient.    Kun Guzman MD     [facility] [place] Office: [phone]  Primary  [facility] [place]: [facility]   6/21/2022

## 2022-06-21 NOTE — PROGRESS NOTES
"   [facility] Cardiology  [place] Office       Patient ID: Ashleigh Peng 71 y.o. female 1950  PCP: Peter Solorzano MD      HPI     Ashleigh Peng is a 71 y.o. female I the pleasure seen in the office today for routine cardiovascular follow-up.    She has a background of hyperlipidemia, mild mitral valve prolapse and regurgitation, tobacco abuse and prior breast cancer.    I last saw her about 6 months ago.  She has been on Crestor and Zetia and got follow-up lipid panel shortly after her last visit showing improvement in LDL, now under 100.  She otherwise has been doing well.  She does not have any other changes to her medications or significant new issues.  She denies chest pain, shortness of breath, palpitations, syncope, lower extremity edema, or claudication.  She is unfortunately still smoking.  She does not exercise formally but she is quite active and feels well with activity.     Medications     Current Outpatient Medications   Medication Instructions   • aspirin 81 mg, oral, Daily   • cholecalciferol (vitamin D3) 2,000 Units, oral, Daily   • cyanocobalamin (VITAMIN B12) 1,000 mcg, oral, Daily   • ezetimibe (ZETIA) 10 mg, oral, Daily   • multivitamin-iron-folic acid  mg-mcg tablet 1 tablet, oral, Daily   • rosuvastatin (CRESTOR) 40 mg, oral, Daily   • ubidecarenone (COENZYME Q10 ORAL) 1 tablet, oral, Daily         Allergies     No known allergies     Vital Signs/Exam     Vitals:    06/21/22 1001   BP: 102/70   BP Location: Left upper arm   Patient Position: Sitting   Pulse: 78   SpO2: 97%   Weight: 59.4 kg (131 lb)   Height: 1.727 m (5' 8\")     BP Readings from Last 3 Encounters:   06/21/22 102/70   12/21/21 110/76   10/13/21 120/72     Wt Readings from Last 3 Encounters:   06/21/22 59.4 kg (131 lb)   12/21/21 59 kg (130 lb)   10/13/21 58.1 kg (128 lb)        Gen: no distress  HEENT: no scleral icterus  Neck: no JVD, no carotid bruit  Lungs: clear bilaterally; no crackles, rhonchi, " wheezing  Heart/Chest: regular rate and rhythm; very soft systolic ejection murmur heard at right upper sternal border  Extremities: no edema  Neuro: nonfocal, alert and oriented  Psych: normal mood and affect     Diagnostic Data   Diagnostic data personally reviewed. Available medical records were reviewed and updated where appropriate including laboratory data, imaging studies, and previous outpatient and inpatient records.  Counseling/education performed.      Labs:  Lab Results   Component Value Date    WBC 6.52 10/27/2021    HGB 14.1 10/27/2021     10/27/2021    ALT 26 10/27/2021    AST 26 10/27/2021     10/27/2021    K 4.4 10/27/2021     10/27/2021    CREATININE 0.9 10/27/2021    BUN 13 10/27/2021    CO2 24 10/27/2021    TSH 1.74 10/27/2021    HGBA1C 5.7 (H) 12/31/2021       Lab Results   Component Value Date    CHOL 168 12/31/2021    CHOL 193 10/27/2021    LDLCALC 97 12/31/2021    LDLCALC 117 (H) 10/27/2021    HDL 61 12/31/2021    HDL 63 10/27/2021    TRIG 48 12/31/2021    TRIG 65 10/27/2021       Echocardiogram:  1/22/2021  LV systolic function without segmental wall motion normalities, EF 60%  Abnormal diastolic function  Calcified leaflet aortic valve with decreased motion but no hemodynamically significant stenosis  Mildly thickened anterior mitral valve leaflet with minimal mitral valve prolapse and mild MR, trace TR, estimated PASP of 26    Stress Tests:   Stress Myoview January 2019  Negative for ischemia and scar next normal LV wall motion EF in size  Stress EKG negative for ischemia  Good exercise tolerance          Cardiac cath:      Vascular Studies:  No results found for this or any previous visit from the past 365 days.    2016 carotid ultrasound normal      Peripheral:  No results found for this or any previous visit from the past 365 days.      Cardiac Monitor:     ECG independently reviewed: NSR, LAD     Assessment and Plan        Ashleigh Peng is a 71 y.o. female who  presents today for office follow-up.    #1.  Hyperlipidemia  Her LDL remains higher than would be expected or ideal while on high intensity statin and Zetia. She is now consistently taking Crestor 40 mg daily as well as Zetia.  -LDL now 97.  For now this seems to be reasonably well at goal and I would like it to at least be under 100.  Next steps would be addition of PCSK9 inhibitor or Nexletol.  She does not have clearly documented ASCVD so I think that insurance approval on either of these medications might be challenging.  For now I think it is fine to continue on her current doses and we will plan to recheck a lipid panel prior to her upcoming visit in 6 months.     #2.  Valvular heart disease   The patient has mitral valve prolapse which is minimal along with mild mitral regurgitation.  She also has a calcified trileaflet aortic valve with decreased motion but no hemodynamic aortic stenosis.  She has a very mild outflow murmur from her aortic valve.     #3.  Tobacco abuse   Cessation encouraged         I will plan to see her again in about 6 months.     Available medical records were reviewed and updated where appropriate including laboratory data, imaging studies, and previous outpatient and inpatient records.  Counseling/education performed.       Thank you for allowing me to participate in the care of this patient.    Kun Guzman MD     [facility] Office: [phone]  [place]: [facility]   6/21/2022

## 2022-10-05 RX ORDER — ROSUVASTATIN CALCIUM 40 MG/1
40 TABLET, COATED ORAL NIGHTLY
Qty: 90 TABLET | Refills: 3 | Status: SHIPPED | OUTPATIENT
Start: 2022-10-05 | End: 2023-09-11

## 2022-10-05 RX ORDER — EZETIMIBE 10 MG/1
10 TABLET ORAL DAILY
Qty: 90 TABLET | Refills: 3 | Status: SHIPPED | OUTPATIENT
Start: 2022-10-05 | End: 2023-09-11

## 2022-10-05 NOTE — TELEPHONE ENCOUNTER
Medicine Refill Request    Last Office: Visit date not found   Last Consult Visit: Visit date not found  Last Telemedicine Visit: Visit date not found    Next Appointment: Visit date not found  ezetimibe (ZETIA) 10 mg tablet  rosuvastatin (CRESTOR) 40 mg tablet    Current Outpatient Medications:     aspirin 81 mg enteric coated tablet, Take 81 mg by mouth daily. , Disp: , Rfl:     cholecalciferol, vitamin D3, 2,000 unit tablet, Take 2,000 Units by mouth daily., Disp: , Rfl:     cyanocobalamin (VITAMIN B12) 1,000 mcg tablet, Take 1,000 mcg by mouth daily., Disp: , Rfl:     ezetimibe (ZETIA) 10 mg tablet, Take 1 tablet (10 mg total) by mouth daily., Disp: 90 tablet, Rfl: 3    multivitamin-iron-folic acid  mg-mcg tablet, Take 1 tablet by mouth daily., Disp: , Rfl:     rosuvastatin (CRESTOR) 40 mg tablet, Take 1 tablet (40 mg total) by mouth daily., Disp: 90 tablet, Rfl: 3    ubidecarenone (COENZYME Q10 ORAL), Take 1 tablet by mouth daily., Disp: , Rfl:       BP Readings from Last 3 Encounters:   06/21/22 102/70   12/21/21 110/76   10/13/21 120/72       Recent Lab results:  Lab Results   Component Value Date    CHOL 168 12/31/2021   ,   Lab Results   Component Value Date    HDL 61 12/31/2021   ,   Lab Results   Component Value Date    LDLCALC 97 12/31/2021   ,   Lab Results   Component Value Date    TRIG 48 12/31/2021        Lab Results   Component Value Date    GLUCOSE 113 (H) 10/27/2021   ,   Lab Results   Component Value Date    HGBA1C 5.7 (H) 12/31/2021         Lab Results   Component Value Date    CREATININE 0.9 10/27/2021       Lab Results   Component Value Date    TSH 1.74 10/27/2021

## 2022-12-05 ENCOUNTER — OFFICE VISIT (OUTPATIENT)
Dept: PRIMARY CARE | Facility: CLINIC | Age: 72
End: 2022-12-05
Payer: MEDICARE

## 2022-12-05 VITALS
OXYGEN SATURATION: 97 % | RESPIRATION RATE: 16 BRPM | SYSTOLIC BLOOD PRESSURE: 132 MMHG | HEIGHT: 68 IN | WEIGHT: 137.6 LBS | DIASTOLIC BLOOD PRESSURE: 84 MMHG | TEMPERATURE: 98.5 F | BODY MASS INDEX: 20.85 KG/M2 | HEART RATE: 87 BPM

## 2022-12-05 DIAGNOSIS — E78.2 MIXED HYPERLIPIDEMIA: Chronic | ICD-10-CM

## 2022-12-05 DIAGNOSIS — R73.9 HYPERGLYCEMIA: ICD-10-CM

## 2022-12-05 DIAGNOSIS — Z00.00 MEDICARE ANNUAL WELLNESS VISIT, SUBSEQUENT: Primary | ICD-10-CM

## 2022-12-05 DIAGNOSIS — Z87.891 PERSONAL HISTORY OF NICOTINE DEPENDENCE: ICD-10-CM

## 2022-12-05 DIAGNOSIS — C50.911 MALIGNANT NEOPLASM OF RIGHT FEMALE BREAST, UNSPECIFIED ESTROGEN RECEPTOR STATUS, UNSPECIFIED SITE OF BREAST (CMS/HCC): ICD-10-CM

## 2022-12-05 DIAGNOSIS — Z12.2 SCREENING FOR LUNG CANCER: ICD-10-CM

## 2022-12-05 PROCEDURE — G0439 PPPS, SUBSEQ VISIT: HCPCS | Performed by: FAMILY MEDICINE

## 2022-12-05 SDOH — HEALTH STABILITY: PHYSICAL HEALTH: ON AVERAGE, HOW MANY MINUTES DO YOU ENGAGE IN EXERCISE AT THIS LEVEL?: 0 MIN

## 2022-12-05 SDOH — HEALTH STABILITY: PHYSICAL HEALTH: ON AVERAGE, HOW MANY DAYS PER WEEK DO YOU ENGAGE IN MODERATE TO STRENUOUS EXERCISE (LIKE A BRISK WALK)?: 0 DAYS

## 2022-12-05 ASSESSMENT — ENCOUNTER SYMPTOMS
COUGH: 0
WHEEZING: 0
CHILLS: 0
DIZZINESS: 0
SPEECH DIFFICULTY: 0
DYSURIA: 0
RHINORRHEA: 0
BACK PAIN: 0
VOMITING: 0
CONSTIPATION: 0
DYSPHORIC MOOD: 0
ROS GI COMMENTS: INTERMITTENT HEARTBURN
DIARRHEA: 0
EYE ITCHING: 0
NERVOUS/ANXIOUS: 0
LIGHT-HEADEDNESS: 0
FATIGUE: 0
EYE REDNESS: 0
TROUBLE SWALLOWING: 0
MYALGIAS: 0
DIFFICULTY URINATING: 0
NAUSEA: 0
SINUS PRESSURE: 0
SORE THROAT: 0
HEMATURIA: 0
FREQUENCY: 0
ARTHRALGIAS: 0
FEVER: 0
SHORTNESS OF BREATH: 0
CONFUSION: 0
SLEEP DISTURBANCE: 0
APPETITE CHANGE: 0
HEADACHES: 0
PALPITATIONS: 0
EYE DISCHARGE: 0
WEAKNESS: 0
EYE PAIN: 0
ABDOMINAL PAIN: 0
CHEST TIGHTNESS: 0
NECK PAIN: 0
BLOOD IN STOOL: 0
UNEXPECTED WEIGHT CHANGE: 0
NUMBNESS: 0
BRUISES/BLEEDS EASILY: 0
JOINT SWELLING: 0

## 2022-12-05 ASSESSMENT — LIFESTYLE VARIABLES
HOW OFTEN DO YOU HAVE SIX OR MORE DRINKS ON ONE OCCASION: NEVER
HOW OFTEN DO YOU HAVE A DRINK CONTAINING ALCOHOL: 2-3 TIMES A WEEK
HOW MANY STANDARD DRINKS CONTAINING ALCOHOL DO YOU HAVE ON A TYPICAL DAY: 1 OR 2

## 2022-12-05 ASSESSMENT — MINI COG
TOTAL SCORE: 5
COMPLETED: YES

## 2022-12-05 ASSESSMENT — PATIENT HEALTH QUESTIONNAIRE - PHQ9: SUM OF ALL RESPONSES TO PHQ9 QUESTIONS 1 & 2: 0

## 2022-12-05 NOTE — ASSESSMENT & PLAN NOTE
· The patient is currently stable.   · Bloodwork was ordered including a complete blood count, complete metabolic profile, thyroid stimulating hormone, and a lipid profile.   · Further recommendations will be provided to the patient based on the results of the blood tests.   · The patient should exercise for 2.5 hours per week.   · All the recommend vaccinations are up to date.   · The patient should be evaluated by the ophthalmologist every year and dentist every 6 months.   · The patient was advised to protect the skin by applying suntan lotion containing an SPF > 30 every 2 hours while in sun or after swimming. Instructed to avoid prolonged direct sun exposure as much as possible.   · The patient's body mass index is normal.  · The patient's gynecological examination and mammogram are up to date.   · The patient's DEXA scan is up to date.   · The patient's colonoscopy is up to date.   · Advised to consume 1000 mg of calcium daily through the diet. If the patient is unable to consume 1000 mg through the diet, then taking calcium supplements is an acceptable alternative. The patient was informed not to consume more than 500 mg of a calcium supplement at a time.

## 2022-12-05 NOTE — PROGRESS NOTES
Subjective     Ashleigh Peng is a 72 y.o. female who presents for a subsequent annual wellness visit.     Patient Care Team:  Peter Solorzano MD as PCP - General (Family Medicine)  Pepe Jasmine MD as Consulting Physician (Obstetrics and Gynecology)  Kun Guzman MD as Consulting Physician (Cardiology)  Donis Patten MD as Consulting Physician (Ophthalmology)  Jon Solorzano MD as Consulting Physician (Oncology)  Matt Bacon MD as Consulting Physician (Dermatology)  Brady Schwab MD as Consulting Physician (Otolaryngology)    Comprehensive Medical and Social History  Patient Active Problem List   Diagnosis   • Hyperlipidemia   • Malignant neoplasm of unspecified site of right female breast (CMS/HCC)   • Hyperglycemia   • Medicare annual wellness visit, subsequent   • Diastolic dysfunction   • Malignant neoplasm of female breast (CMS/HCC)   • Mitral regurgitation   • Murmur   • Osteopenia of both hips   • Sudden hearing loss     Past Medical History:   Diagnosis Date   • Diastolic dysfunction 12/11/2019    Seen on echocardiogram in 12/2019. Has EF of 60%, mild mitral regurgitation and mild tricuspid regurgitation. Has minimally elevated transaortic gradient. Echocardiogram on 1/22/2021: Normal left ventricular systolic function without segmental wall motion abnormalities.  Ejection fraction 60%.  Abnormal diastolic function.  Calcified trileaflet aortic valve with decreased motion but no significant   • Hyperglycemia 12/12/2018   • Hyperlipidemia 10/26/2015    Cardiologist: Dr. Guzman. Managed with Rosuvastatin, Ezetimibe, and Lovaza.  Requires periodic monitoring of lipids and liver function tests.  Advised to follow low fat diet and to keep BMI < 25.  Advised to exercise for 2.5 hours per week.     • Malignant neoplasm of female breast (CMS/HCC) 3/1/2007    Oncologist: Dr. Jon Solorzano Surgeon: Dr. Bragg Right lumpectomy. Dx  BREAST CANCER, Dx Date 3/2007, Histology  , Stage @ Dx  T1,N0, ER/AK+  HERII+, Disease Status  SIN Treatment was radiation and Arimidex x 5 years Managed with yearly mammograms.   • Osteopenia of both hips 10/13/2021    DEXA scan done in 10/2018. LS -0.1, LH -1.7, RH -1.5.  DEXA Scan 12/2021 with LS -0.2, LH -1.5, RH -1.4.  Recheck DEXA Scan in 12/2023 Managed with vitamin D3 and weight bearing exercise.      Past Surgical History:   Procedure Laterality Date   • BREAST LUMPECTOMY Right 2007    Dr. Bragg   • WISDOM TOOTH EXTRACTION       Allergies   Allergen Reactions   • No Known Allergies      Current Outpatient Medications   Medication Sig Dispense Refill   • aspirin 81 mg enteric coated tablet Take 81 mg by mouth daily.      • cholecalciferol, vitamin D3, 2,000 unit tablet Take 2,000 Units by mouth daily.     • cyanocobalamin (VITAMIN B12) 1,000 mcg tablet Take 1,000 mcg by mouth daily.     • ezetimibe (ZETIA) 10 mg tablet Take 1 tablet (10 mg total) by mouth daily. 90 tablet 3   • multivitamin-iron-folic acid  mg-mcg tablet Take 1 tablet by mouth daily.     • rosuvastatin (CRESTOR) 40 mg tablet Take 1 tablet (40 mg total) by mouth nightly. 90 tablet 3   • ubidecarenone (COENZYME Q10 ORAL) Take 1 tablet by mouth daily.       No current facility-administered medications for this visit.     Social History     Tobacco Use   • Smoking status: Every Day     Packs/day: 0.50     Years: 52.00     Pack years: 26.00     Types: Cigarettes   • Smokeless tobacco: Current   Vaping Use   • Vaping Use: Never used   Substance Use Topics   • Alcohol use: Yes     Alcohol/week: 2.0 - 5.0 standard drinks     Types: 2 - 5 Standard drinks or equivalent per week   • Drug use: Never     Family History   Problem Relation Age of Onset   • Kidney disease Biological Mother    • Heart disease Biological Mother    • Hyperlipidemia Biological Mother    • COPD Biological Father    • Other Biological Sister         Meningioma   • Hyperlipidemia Biological Brother    • Throat cancer Biological Brother    •  "Brain cancer Paternal Grandmother    • Lung cancer Paternal Grandfather    • No Known Problems Biological Brother      Review of Systems   Constitutional: Negative for appetite change, chills, fatigue, fever and unexpected weight change.   HENT: Negative for congestion, ear pain, hearing loss, nosebleeds, postnasal drip, rhinorrhea, sinus pressure, sneezing, sore throat and trouble swallowing.    Eyes: Negative for pain, discharge, redness, itching and visual disturbance.   Respiratory: Negative for cough, chest tightness, shortness of breath and wheezing.    Cardiovascular: Negative for chest pain, palpitations and leg swelling.   Gastrointestinal: Negative for abdominal pain, blood in stool, constipation, diarrhea, nausea and vomiting.        Intermittent heartburn   Endocrine: Negative for cold intolerance and heat intolerance.   Genitourinary: Negative for difficulty urinating, dysuria, frequency, hematuria, urgency, vaginal bleeding and vaginal discharge.   Musculoskeletal: Negative for arthralgias, back pain, gait problem, joint swelling, myalgias and neck pain.   Skin: Negative for rash.   Allergic/Immunologic: Negative for environmental allergies.   Neurological: Negative for dizziness, syncope, speech difficulty, weakness, light-headedness, numbness and headaches.   Hematological: Does not bruise/bleed easily.   Psychiatric/Behavioral: Negative for confusion, dysphoric mood and sleep disturbance. The patient is not nervous/anxious.        Objective   Vitals  Vitals:    12/05/22 1627   BP: 132/84   Pulse: 87   Resp: 16   Temp: 36.9 °C (98.5 °F)   TempSrc: Oral   SpO2: 97%   Weight: 62.4 kg (137 lb 9.6 oz)  Comment: without shoes   Height: 1.727 m (5' 8\")  Comment: without shoes     Body mass index is 20.92 kg/m².    Wt Readings from Last 3 Encounters:   12/05/22 62.4 kg (137 lb 9.6 oz)   06/21/22 59.4 kg (131 lb)   12/21/21 59 kg (130 lb)     Physical Exam  Constitutional:       General: She is not in acute " distress.     Appearance: Normal appearance. She is well-developed. She is not ill-appearing, toxic-appearing or diaphoretic.   HENT:      Head: Normocephalic and atraumatic.      Right Ear: Tympanic membrane, ear canal and external ear normal.      Left Ear: Tympanic membrane, ear canal and external ear normal.      Nose: No mucosal edema, congestion or rhinorrhea.      Right Sinus: No maxillary sinus tenderness or frontal sinus tenderness.      Left Sinus: No maxillary sinus tenderness or frontal sinus tenderness.      Mouth/Throat:      Mouth: No oral lesions.      Pharynx: Uvula midline. No oropharyngeal exudate or posterior oropharyngeal erythema.      Tonsils: No tonsillar exudate.   Eyes:      General: Lids are normal. No scleral icterus.        Right eye: No discharge.         Left eye: No discharge.      Conjunctiva/sclera:      Right eye: Right conjunctiva is not injected. No exudate.     Left eye: Left conjunctiva is not injected. No exudate.     Pupils: Pupils are equal, round, and reactive to light.   Neck:      Thyroid: No thyroid mass or thyromegaly.      Vascular: No carotid bruit.   Cardiovascular:      Rate and Rhythm: Normal rate and regular rhythm.      Pulses:           Popliteal pulses are 2+ on the right side and 2+ on the left side.        Dorsalis pedis pulses are 2+ on the right side and 2+ on the left side.      Heart sounds: Normal heart sounds. No murmur heard.    No gallop. No S3 or S4 sounds.   Pulmonary:      Effort: Pulmonary effort is normal. No respiratory distress.      Breath sounds: Normal breath sounds. No decreased breath sounds, wheezing, rhonchi or rales.   Chest:      Chest wall: No tenderness.   Abdominal:      General: Bowel sounds are normal. There is no distension.      Palpations: Abdomen is soft.      Tenderness: There is no abdominal tenderness. There is no guarding or rebound.      Hernia: No hernia is present.   Musculoskeletal:      Cervical back: Normal range of  motion and neck supple.      Right lower leg: No edema.      Left lower leg: No edema.   Lymphadenopathy:      Cervical: No cervical adenopathy.   Skin:     General: Skin is warm and dry.      Findings: No rash.   Neurological:      Mental Status: She is alert.   Psychiatric:         Mood and Affect: Mood normal.         Speech: Speech normal.         Behavior: Behavior normal.         Thought Content: Thought content normal.         Judgment: Judgment normal.           Advanced Care Plan  Does patient have advance directive?: No   Patient does not have Advance Directive: Patient/Family declines further information                                 PHQ  Will the patient answer the depression questions?: Yes   Little interest or pleasure in doing things: Not at all   Feeling down, depressed, or hopeless: Not at all   Depression Risk: 0                                             Mini Cog  Completed: Yes  Score: 5  Result: Negative      Get Up and Go  Result: Pass    STEADI Falls Risk  One or more falls in the last year: No           Has trouble stepping up onto a curb: No   Advised to use a cane or walker to get around safely: No   Often has to rush to the toilet: No   Feels unsteady when walking: No   Has lost some feeling in feet: No   Often feels sad or depressed: No   Steadies self on furniture while walking at home: No   Takes medication that makes him/her feel lightheaded or more tired than usual: No   Worried about falling: No   Takes medicine to sleep or improve mood: No   Needs to push with hands when rising from a chair: No   Falls screen completed: Yes     Immunization History   Administered Date(s) Administered   • Hepatitis A 01/15/2015, 06/06/2015   • INFLUENZA VACCINE QUAD ADJUVANTED 65 and OLDER 09/15/2020   • Influenza Split High Dose Preservative Free IM 10/26/2015   • Influenza Vaccine High Dose 65 And Older 10/01/2019, 09/23/2021, 10/06/2022   • Influenza Vaccine Quadrivalent 3 Yr And Older  01/01/2009, 01/01/2010, 01/01/2011, 01/01/2012, 01/01/2013, 01/01/2014   • Influenza Vaccine Quadrivalent Preservative Free 6-35 Months 10/31/2016   • Influenza, Unspecified 11/20/2013, 11/01/2016, 10/01/2018   • Pneumococcal Conjugate 13-Valent 10/26/2015   • Pneumococcal Polysaccharide 10/12/2011, 11/26/2019   • SARS-COV-2 (COVID-19) VACCINE PFIZER BIVALENT BOOSTER 12 years and older ([employer]) 10/24/2022   • SARS-COV-2 (COVID19) VACCINE, PFIZER 02/12/2021, 03/13/2021, 10/14/2021, 04/21/2022   • Tdap 10/10/2010, 04/12/2021   • Zoster 12/09/2015   • Zoster Vaccine Recombinant Adjuvanted (Shingrix) 04/12/2021, 08/10/2021       Health Maintenance Topics with due status: Not Due       Topic Last Completion Date    Colorectal Cancer Screening 11/08/2016    DTaP, Tdap, and Td Vaccines 04/12/2021    DEXA Scan 12/08/2021    Breast Cancer Screening 10/13/2022    Medicare Annual Wellness Visit 12/05/2022    Depression Screening 12/05/2022     Health Maintenance Topics with due status: Completed       Topic Last Completion Date    Hepatitis C Screening 12/12/2018    Pneumococcal (65 years and older) 11/26/2019    Zoster Vaccine 08/10/2021    Influenza Vaccine 10/06/2022    COVID-19 Vaccine 10/24/2022    Annual Falls Risk Screening 12/05/2022     Health Maintenance Topics with due status: Aged Out       Topic Date Due    Meningococcal ACWY Aged Out    HIB Vaccines Aged Out    Hepatitis B Vaccines Aged Out    IPV Vaccines Aged Out    HPV Vaccines Aged Out       Lab Results   Component Value Date    WBC 6.52 10/27/2021    WBC 5.97 07/14/2020    WBC 5.77 08/19/2019    HGB 14.1 10/27/2021    HGB 13.5 07/14/2020    HGB 12.5 08/19/2019    HCT 43.4 10/27/2021    HCT 41.0 07/14/2020    HCT 38.9 08/19/2019    .0 (H) 10/27/2021    MCV 97.9 07/14/2020    MCV 96.8 08/19/2019     10/27/2021     07/14/2020     08/19/2019       Lab Results   Component Value Date    GLUCOSE 113 (H) 10/27/2021    GLUCOSE 112 (H)  07/14/2020    GLUCOSE 111 (H) 08/19/2019    CALCIUM 9.7 10/27/2021    CALCIUM 9.5 07/14/2020    CALCIUM 9.4 08/19/2019     10/27/2021     07/14/2020     08/19/2019    K 4.4 10/27/2021    K 4.4 07/14/2020    K 4.3 08/19/2019    CO2 24 10/27/2021    CO2 27 07/14/2020    CO2 27 08/19/2019     10/27/2021     07/14/2020     08/19/2019    BUN 13 10/27/2021    BUN 14 11/04/2020    BUN 13 07/14/2020    CREATININE 0.9 10/27/2021    CREATININE 1.0 11/04/2020    CREATININE 0.9 07/14/2020       Lab Results   Component Value Date    ALT 26 10/27/2021    ALT 19 07/14/2020    ALT 20 12/09/2019    AST 26 10/27/2021    AST 23 07/14/2020    AST 23 12/09/2019    GGT 10 06/03/2015    ALKPHOS 53 10/27/2021    ALKPHOS 51 07/14/2020    ALKPHOS 53 12/09/2019    BILITOT 0.8 10/27/2021    BILITOT 0.7 07/14/2020    BILITOT 0.5 12/09/2019       Lab Results   Component Value Date    CHOL 168 12/31/2021    CHOL 193 10/27/2021    CHOL 189 02/23/2021     Lab Results   Component Value Date    HDL 61 12/31/2021    HDL 63 10/27/2021    HDL 68 02/23/2021     Lab Results   Component Value Date    LDLCALC 97 12/31/2021    LDLCALC 117 (H) 10/27/2021    LDLCALC 110 (H) 02/23/2021     Lab Results   Component Value Date    TRIG 48 12/31/2021    TRIG 65 10/27/2021    TRIG 54 02/23/2021       Lab Results   Component Value Date    TSH 1.74 10/27/2021    TSH 1.59 07/14/2020    TSH 1.55 07/31/2019       Lab Results   Component Value Date    HEPCAB Nonreactive 12/12/2018         Hearing and Vision Screening  No results found.  See HRA for relevant hearing screening response.    Assessment/Plan   Diagnoses and all orders for this visit:    Medicare annual wellness visit, subsequent (Primary)  Assessment & Plan:  · The patient is currently stable.   · Bloodwork was ordered including a complete blood count, complete metabolic profile, thyroid stimulating hormone, and a lipid profile.   · Further recommendations will be provided to  the patient based on the results of the blood tests.   · The patient should exercise for 2.5 hours per week.   · All the recommend vaccinations are up to date.   · The patient should be evaluated by the ophthalmologist every year and dentist every 6 months.   · The patient was advised to protect the skin by applying suntan lotion containing an SPF > 30 every 2 hours while in sun or after swimming. Instructed to avoid prolonged direct sun exposure as much as possible.   · The patient's body mass index is normal.  · The patient's gynecological examination and mammogram are up to date.   · The patient's DEXA scan is up to date.   · The patient's colonoscopy is up to date.   · Advised to consume 1000 mg of calcium daily through the diet. If the patient is unable to consume 1000 mg through the diet, then taking calcium supplements is an acceptable alternative. The patient was informed not to consume more than 500 mg of a calcium supplement at a time.         Malignant neoplasm of right female breast, unspecified estrogen receptor status, unspecified site of breast (CMS/HCC)    Mixed hyperlipidemia  -     CBC and Differential; Future  -     Comprehensive metabolic panel; Future  -     TSH 3rd Generation; Future    Hyperglycemia  -     Hemoglobin A1c; Future    Screening for lung cancer  -     CT LUNG SCREENING WITHOUT IV CONTRAST; Future    Personal history of nicotine dependence  -     CT LUNG SCREENING WITHOUT IV CONTRAST; Future      See Patient Instructions (the written plan) which was given to the patient for PPPS and health risk factors with interventions.

## 2022-12-05 NOTE — PATIENT INSTRUCTIONS
Women's Personalized Prevention Plan Services (PPPS)       Preventive Services Checklist (Assumes Average Risk Unless Otherwise Noted)  Preventive Service Target Population and Frequency Last Done Date Due   Abd Aortic Aneurysm Screening Family history of AAA (covered once)  Not needed    Alcohol Misuse Screening As necessary for those at risk (covered annually) 12/05/2022 12/2023   Breast Cancer Screening Mammogram every 1-2yrs 50-69yo; every 1-2yrs 35-48yo if patient desires after weighing potential harms and benefits (covered once 35-40yo, annually >=39yo) 10/05/2021 Ordered    Cholesterol Screening Both risk factors: 1) >=21yo and 2)  increased risk coronary artery disease (covered every 5 years) 12/31/2021 Ordered    Colorectal Cancer Screening >=49yo: Colonoscopy every 10 years, FIT annually or Cologuard every 3 years (up to 84yo for Cologuard) 11/08/2016 11/2026   Diabetes Screening Any 1 risk factor: hypertension, dyslipidemia, obesity, high glucose; or Any 2 risk factors: >=66 yo, overweight, family history diabetes, history gestational diabetes or delivery of infant >9lbs (covered every 6 months) 10/27/2021 Ordered    Glaucoma Screening Any 1 risk factor: 1) diabetes, 2) family history glaucoma, 3)  >=49yo, 4)  American >=64yo (covered annually) Spring 2021 Due at this time    Hepatitis C Screening Any 1 risk factor: 1) born between 5107-4567, 2) blood transfusion before 1992, 3) current or past injection drug use (covered once for average risk, annually for high risk) 12/12/2018 Done    Lung Cancer Screening Low-dose chest CT if all 3 risk factors: 1) 55-76yo, 2) smoker or quit within last 15y, 3) >=30 pack years (covered annually)   Not needed    Osteoporosis Screening >=64yo (covered every 24 months)  <64yo if any 1 risk factor: 1) estrogen deficient and at risk for osteoporosis; 2) established osteoporosis on treatment; 3) x-rays show possible osteoporosis, osteopenia or  "vertebral fractures; 4) on steroid or planning to start; 5) primary hyperparathyroidism (covered as medically necessary) 12/08/2021 12/2023   Sexually Transmitted Diseases (STDs) As necessary chlamydia, gonorrhea, syphilis, hepatitis B (covered annually if not pregnant, more often in pregnancy)  HIV if any 1 risk factor present: 1) <14yo or >64yo and at increased risk, 2) 15-64yo and ask for it, or 3) pregnant (covered annually if not pregnant, up to 3x in pregnancy)   Low Risk    Vaccine: Hepatitis B As necessary if medium or high risk (series covered once)   Not needed     Vaccine: Influenza All patients without contraindications (covered annually.) 10/06/2022 Fall 2023   Vaccine: Pneumococcal Pneumovax + Prevnar (both covered, >=11 months apart) Prevnar   10/26/2015  Pneumovax  11/26/2019   Done    Vaccine: Shingrix (Shingles) >= 49yo without contraindications             (2 doses, 2 months apart) Zostavax  12/09/2015  Shingrix   04/12/2021  08/10/2021 Done    TDAP Every 10 years   04/12/2021 04/2031                                    Women's Personalized Prevention Plan Services (PPPS)                                                          Prints to S                                          Health Risk Factors and Interventions  \"x\" Indicates risk is associated with this patient Risk Factor Recommended Interventions    N/A   Overweight     N/A  Hypertension     N/A  Diabetes     N/A  Fall Risk     N/A  Tobacco Use                Problem List Items Addressed This Visit       Hyperlipidemia (Chronic)    Relevant Orders    CBC and Differential    Comprehensive metabolic panel    TSH 3rd Generation    Hyperglycemia    Relevant Orders    Hemoglobin A1c    Malignant neoplasm of female breast (CMS/HCC)    Medicare annual wellness visit, subsequent - Primary     The patient is currently stable.   Bloodwork was ordered including a complete blood count, complete metabolic profile, thyroid stimulating hormone, and a " lipid profile.   Further recommendations will be provided to the patient based on the results of the blood tests.   The patient should exercise for 2.5 hours per week.   All the recommend vaccinations are up to date.   The patient should be evaluated by the ophthalmologist every year and dentist every 6 months.   The patient was advised to protect the skin by applying suntan lotion containing an SPF > 30 every 2 hours while in sun or after swimming. Instructed to avoid prolonged direct sun exposure as much as possible.   The patient's body mass index is normal.  The patient's gynecological examination and mammogram are up to date.   The patient's DEXA scan is up to date.   The patient's colonoscopy is up to date.   Advised to consume 1000 mg of calcium daily through the diet. If the patient is unable to consume 1000 mg through the diet, then taking calcium supplements is an acceptable alternative. The patient was informed not to consume more than 500 mg of a calcium supplement at a time.             Other Visit Diagnoses       Screening for lung cancer        Relevant Orders    CT LUNG SCREENING WITHOUT IV CONTRAST    Personal history of nicotine dependence        Relevant Orders    CT LUNG SCREENING WITHOUT IV CONTRAST                  Health Risk Factors with Personalized Education:  ----------------------------------------------------------------------------------------------------------------------  Controlling Your Cholesterol  Reduce the amount of saturated and trans fat in your diet.  Limit intake of red meat.  Consume only low-fat or non-fat/skim dairy.  Limit fried food.  Cook with vegetable oils.  Reduce your intake of sugary foods, sugary drinks and alcohol.  Eat a diet high in fruit, vegetables and whole grains.  Get protein from fish, poultry and a small portion of nuts.  Stay active.  Try to get at least 90 to 150 minutes of exercise per week.  Try brisk walking, swimming, bicycling or  dancing.  Maintain a healthy weight by balancing your diet and exercise.  If you have been prescribed medication, take it regularly and exactly as prescribed. Let your PCP know if you have any problems or questions about your medication.  It’s important to know your cholesterol numbers.  When recommended by your PCP, get the cholesterol blood test.  ---------------------------------------------------------------------------------------------------------------------   Controlling Your Osteoporosis, Strengthening Your Bones  Try to get at least 90 to 150 minutes of weight-bearing exercise per week.  Ensure intake of at least 1200mg of calcium per day.  Eat foods high in calcium like milk and other dairy, green vegetables, fruit, canned fish with soft and edible bones, nuts, calcium-set tofu.  Some foods are calcium-fortified, like bread, cereal, fruit juices and mineral water.  Help your body make vitamin D by getting 10-15 minutes per day of sunlight.    Ensure intake of at least 600IU of vitamin D per day.  Eat foods high in vitamin D like oily fish (salmon, sardines, mackerel) and eggs.  Some foods are fortified with vitamin D, like dairy and cereals.  Avoid high amounts of caffeine and salt, since they can cause the body to loose calcium.  Limit alcohol intake, since it is associated with weaker bones and is associated with falls and fractures.  Limit intake of fizzy drinks.  If you have been prescribed medication, take it regularly and exactly as prescribed.  Let your PCP know if you have any problems or questions about your medication  ----------------------------------------------------------------------------------------------------------------------  Controlling Your Osteopenia, Strengthening Your Bones  Try to get at least 90 to 150 minutes of weight-bearing exercise per week.  Ensure intake of at least 1200mg of calcium per day.  Eat foods high in calcium like milk and other dairy, green vegetables, fruit,  canned fish with soft and edible bones, nuts, calcium-set tofu.  Some foods are calcium-fortified, like bread, cereal, fruit juices and mineral water.  Help your body make vitamin D by getting 10-15 minutes per day of sunlight.    Ensure intake of at least 600IU of vitamin D per day.  Eat foods high in vitamin D like oily fish (salmon, sardines, mackerel) and eggs.  Some foods are fortified with vitamin D, like dairy and cereals.  Avoid high amounts of caffeine and salt, since they can cause the body to loose calcium.  Limit alcohol intake, since it is associated with weaker bones and is associated with falls and fractures.  Limit intake of fizzy drinks.  If you have been prescribed medication, take it regularly and exactly as prescribed.  Let your PCP know if you have any problems or questions about your medication  ----------------------------------------------------------------------------------------------------------------------  Reducing Your Risk of Falls  Tell your PCP if any of your medications make you feel tired, dizzy, lightheaded or off-balance.  Maintain coordination, flexibility and balance by ensuring regular physical activity.  Limit alcohol intake to 1 drink per day.  Consider avoiding all alcohol intake.  Ensure good vision.  Visit an ophthalmologist or optometrist regularly for vision screening or to make sure your glasses / contact lens prescription is correct.  If you need glasses or contacts, wear them.  When you get new glasses or contacts, take time to get used to them.  Do not wear sunglasses or tinted lenses when indoors.  Ensure good hearing.  Have your hearing checked if you are having trouble hearing, or family and friends think you cannot hear them.  If you need a hearing aid, be sure it fits well and wear it.  Get enough rest.  Ensure about 7-9 hours of sleep every day.  Get up slowly from your bed or chairs.  Do not start walking until you are sure you feel steady.  Wear non-skid,  rubber-soled, low-heeled shoes.  Do not walk in socks, or in shoes and slippers with smooth soles.  If your PCP or therapist recommends using a cane or walker, use it regularly.  Make your home safer.  Increase lighting throughout the house, especially at the top and bottom of stairs.  Ensure lighting is easily turned on when getting up in the middle of the night.  Make sure there are two secure rails on all stairs.  Install grab bars in the bathtub / shower and near the toilet.  Consider using a shower chair and / or a hand-held shower.  Spread sand or salt on icy surfaces.  Beware of wet surfaces, which can be icy.  Tell your PCP if you have fallen.

## 2022-12-16 ENCOUNTER — APPOINTMENT (OUTPATIENT)
Dept: LAB | Facility: CLINIC | Age: 72
End: 2022-12-16
Attending: FAMILY MEDICINE
Payer: MEDICARE

## 2022-12-16 DIAGNOSIS — E78.2 MIXED HYPERLIPIDEMIA: Chronic | ICD-10-CM

## 2022-12-16 DIAGNOSIS — R73.9 HYPERGLYCEMIA: ICD-10-CM

## 2022-12-16 LAB
ALBUMIN SERPL-MCNC: 4.3 G/DL (ref 3.4–5)
ALP SERPL-CCNC: 56 IU/L (ref 35–126)
ALT SERPL-CCNC: 27 IU/L (ref 11–54)
ANION GAP SERPL CALC-SCNC: 10 MEQ/L (ref 3–15)
AST SERPL-CCNC: 28 IU/L (ref 15–41)
BASOPHILS # BLD: 0.03 K/UL (ref 0.01–0.1)
BASOPHILS NFR BLD: 0.5 %
BILIRUB SERPL-MCNC: 0.9 MG/DL (ref 0.3–1.2)
BUN SERPL-MCNC: 14 MG/DL (ref 8–20)
CALCIUM SERPL-MCNC: 9.8 MG/DL (ref 8.9–10.3)
CHLORIDE SERPL-SCNC: 105 MEQ/L (ref 98–109)
CHOLEST SERPL-MCNC: 170 MG/DL
CO2 SERPL-SCNC: 27 MEQ/L (ref 22–32)
CREAT SERPL-MCNC: 0.9 MG/DL (ref 0.6–1.1)
DIFFERENTIAL METHOD BLD: ABNORMAL
EOSINOPHIL # BLD: 0.17 K/UL (ref 0.04–0.36)
EOSINOPHIL NFR BLD: 3.1 %
ERYTHROCYTE [DISTWIDTH] IN BLOOD BY AUTOMATED COUNT: 12.4 % (ref 11.7–14.4)
EST. AVERAGE GLUCOSE BLD GHB EST-MCNC: 120 MG/DL
GFR SERPL CREATININE-BSD FRML MDRD: >60 ML/MIN/1.73M*2
GLUCOSE SERPL-MCNC: 103 MG/DL (ref 70–99)
HBA1C MFR BLD HPLC: 5.8 %
HCT VFR BLDCO AUTO: 41.9 % (ref 35–45)
HDLC SERPL-MCNC: 64 MG/DL
HDLC SERPL: 2.7 {RATIO}
HGB BLD-MCNC: 13.9 G/DL (ref 11.8–15.7)
IMM GRANULOCYTES # BLD AUTO: 0.01 K/UL (ref 0–0.08)
IMM GRANULOCYTES NFR BLD AUTO: 0.2 %
LDLC SERPL CALC-MCNC: 94 MG/DL
LYMPHOCYTES # BLD: 2.15 K/UL (ref 1.2–3.5)
LYMPHOCYTES NFR BLD: 38.9 %
MCH RBC QN AUTO: 31.9 PG (ref 28–33.2)
MCHC RBC AUTO-ENTMCNC: 33.2 G/DL (ref 32.2–35.5)
MCV RBC AUTO: 96.1 FL (ref 83–98)
MONOCYTES # BLD: 0.4 K/UL (ref 0.28–0.8)
MONOCYTES NFR BLD: 7.2 %
NEUTROPHILS # BLD: 2.76 K/UL (ref 1.7–7)
NEUTS SEG NFR BLD: 50.1 %
NONHDLC SERPL-MCNC: 106 MG/DL
NRBC BLD-RTO: 0 %
PDW BLD AUTO: 8.8 FL (ref 9.4–12.3)
PLATELET # BLD AUTO: 273 K/UL (ref 150–369)
POTASSIUM SERPL-SCNC: 4.3 MEQ/L (ref 3.6–5.1)
PROT SERPL-MCNC: 6.6 G/DL (ref 6–8.2)
RBC # BLD AUTO: 4.36 M/UL (ref 3.93–5.22)
SODIUM SERPL-SCNC: 142 MEQ/L (ref 136–144)
TRIGL SERPL-MCNC: 61 MG/DL (ref 30–149)
TSH SERPL DL<=0.05 MIU/L-ACNC: 2.19 MIU/L (ref 0.34–5.6)
WBC # BLD AUTO: 5.52 K/UL (ref 3.8–10.5)

## 2022-12-16 PROCEDURE — 85025 COMPLETE CBC W/AUTO DIFF WBC: CPT

## 2022-12-16 PROCEDURE — 36415 COLL VENOUS BLD VENIPUNCTURE: CPT

## 2022-12-16 PROCEDURE — 84443 ASSAY THYROID STIM HORMONE: CPT

## 2022-12-16 PROCEDURE — 80061 LIPID PANEL: CPT

## 2022-12-16 PROCEDURE — 83036 HEMOGLOBIN GLYCOSYLATED A1C: CPT

## 2022-12-16 PROCEDURE — 80053 COMPREHEN METABOLIC PANEL: CPT

## 2022-12-19 ENCOUNTER — DOCUMENTATION (OUTPATIENT)
Dept: PRIMARY CARE | Facility: CLINIC | Age: 72
End: 2022-12-19
Payer: MEDICARE

## 2022-12-19 ENCOUNTER — TELEPHONE (OUTPATIENT)
Dept: CARDIOLOGY | Facility: CLINIC | Age: 72
End: 2022-12-19
Payer: MEDICARE

## 2022-12-19 NOTE — PROGRESS NOTES
Patient notified of blood test results.  Only finding is elevated A1c of 5.8 which is stable over the past 3 years.  Patient advised to follow a low carbohydrate diet and low concentrated sweet diet.  Advised to exercise 2.5 hours per week.  Advised to reduce body mass index to less than 25.  Requires periodic monitoring of A1C.  No other new changes recommended at this time.

## 2022-12-19 NOTE — TELEPHONE ENCOUNTER
----- Message from Kun Guzman MD sent at 12/16/2022  4:52 PM EST -----  Cholesterol is under reasonable control.  No change to medications for now.

## 2022-12-21 ENCOUNTER — OFFICE VISIT (OUTPATIENT)
Dept: CARDIOLOGY | Facility: CLINIC | Age: 72
End: 2022-12-21
Payer: MEDICARE

## 2022-12-21 VITALS
BODY MASS INDEX: 20.46 KG/M2 | DIASTOLIC BLOOD PRESSURE: 78 MMHG | HEIGHT: 68 IN | HEART RATE: 77 BPM | SYSTOLIC BLOOD PRESSURE: 104 MMHG | OXYGEN SATURATION: 98 % | WEIGHT: 135 LBS

## 2022-12-21 DIAGNOSIS — I25.10 CORONARY ARTERY DISEASE, UNSPECIFIED VESSEL OR LESION TYPE, UNSPECIFIED WHETHER ANGINA PRESENT, UNSPECIFIED WHETHER NATIVE OR TRANSPLANTED HEART: Primary | ICD-10-CM

## 2022-12-21 DIAGNOSIS — R01.1 MURMUR: ICD-10-CM

## 2022-12-21 DIAGNOSIS — R73.9 HYPERGLYCEMIA, UNSPECIFIED: ICD-10-CM

## 2022-12-21 DIAGNOSIS — E78.2 MIXED HYPERLIPIDEMIA: ICD-10-CM

## 2022-12-21 PROBLEM — H91.8X3 ASYMMETRICAL HEARING LOSS: Status: ACTIVE | Noted: 2021-12-21

## 2022-12-21 PROCEDURE — 93000 ELECTROCARDIOGRAM COMPLETE: CPT | Performed by: INTERNAL MEDICINE

## 2022-12-21 PROCEDURE — 99214 OFFICE O/P EST MOD 30 MIN: CPT | Performed by: INTERNAL MEDICINE

## 2022-12-21 RX ORDER — PSYLLIUM HUSK 0.4 G
1 CAPSULE ORAL
COMMUNITY
End: 2023-10-26 | Stop reason: ALTCHOICE

## 2022-12-21 NOTE — PROGRESS NOTES
"   [facility] Cardiology  [place] Office       Patient ID: Ashleigh Peng 72 y.o. female 1950  PCP: Peter Solorzano MD      HPI     Ashleigh Peng is a 72 y.o. female I the pleasure seen in the office today for routine cardiovascular follow-up.    She has a background of hyperlipidemia, mild mitral valve prolapse and regurgitation, tobacco abuse and prior breast cancer.    I last saw her about 6 months ago.  She has been on Crestor and Zetia and just had follow-up blood work showing cholesterol in a stable range.  She is feeling well and does not have any other new complaints.  She denies chest pain, shortness of breath, palpitations, syncope, lower extremity edema, or claudication.  She is still smoking.  She got a Fitbit and is planning to monitor her steps while she is in [place].  She has no other specific new complaints at this point.       Medications     Current Outpatient Medications   Medication Instructions   • aspirin 81 mg, oral, Daily   • calcium citrate-vitamin D3 (CITRACAL) 200 mg-6.25 mcg (250 unit) tablet 1 tablet, oral, Once in a while   • cholecalciferol (vitamin D3) 2,000 Units, oral, Daily   • cyanocobalamin (VITAMIN B12) 1,000 mcg, oral, Daily   • ezetimibe (ZETIA) 10 mg, oral, Daily   • multivitamin-iron-folic acid  mg-mcg tablet 1 tablet, oral, Daily   • rosuvastatin (CRESTOR) 40 mg, oral, Nightly   • ubidecarenone (COENZYME Q10 ORAL) 1 tablet, oral, Daily         Allergies     No known allergies     Vital Signs/Exam     Vitals:    12/21/22 0953   BP: 104/78   BP Location: Left upper arm   Patient Position: Sitting   Pulse: 77   SpO2: 98%   Weight: 61.2 kg (135 lb)   Height: 1.727 m (5' 8\")     BP Readings from Last 3 Encounters:   12/21/22 104/78   12/05/22 132/84   06/21/22 102/70     Wt Readings from Last 3 Encounters:   12/21/22 61.2 kg (135 lb)   12/05/22 62.4 kg (137 lb 9.6 oz)   06/21/22 59.4 kg (131 lb)        Gen: no distress  HEENT: no scleral icterus  Neck: no JVD, no " carotid bruit  Lungs: clear bilaterally; no crackles, rhonchi, wheezing  Heart/Chest: regular rate and rhythm; very soft systolic ejection murmur heard at right upper sternal border  Extremities: no edema  Neuro: nonfocal, alert and oriented  Psych: normal mood and affect     Diagnostic Data   Diagnostic data personally reviewed. Available medical records were reviewed and updated where appropriate including laboratory data, imaging studies, and previous outpatient and inpatient records.  Counseling/education performed.      Labs:  Lab Results   Component Value Date    WBC 5.52 12/16/2022    HGB 13.9 12/16/2022     12/16/2022    ALT 27 12/16/2022    AST 28 12/16/2022     12/16/2022    K 4.3 12/16/2022     12/16/2022    CREATININE 0.9 12/16/2022    BUN 14 12/16/2022    CO2 27 12/16/2022    TSH 2.19 12/16/2022    HGBA1C 5.8 (H) 12/16/2022       Lab Results   Component Value Date    CHOL 170 12/16/2022    CHOL 168 12/31/2021    LDLCALC 94 12/16/2022    LDLCALC 97 12/31/2021    HDL 64 12/16/2022    HDL 61 12/31/2021    TRIG 61 12/16/2022    TRIG 48 12/31/2021       Echocardiogram:  1/22/2021  LV systolic function without segmental wall motion normalities, EF 60%  Abnormal diastolic function  Calcified leaflet aortic valve with decreased motion but no hemodynamically significant stenosis  Mildly thickened anterior mitral valve leaflet with minimal mitral valve prolapse and mild MR, trace TR, estimated PASP of 26    Stress Tests:   Stress Myoview January 2019  Negative for ischemia and scar next normal LV wall motion EF in size  Stress EKG negative for ischemia  Good exercise tolerance          Cardiac cath:      Vascular Studies:  No results found for this or any previous visit from the past 365 days.    2016 carotid ultrasound normal      Peripheral:  No results found for this or any previous visit from the past 365 days.      Cardiac Monitor:     ECG independently reviewed: ASIA SEYMOUR     Assessment  and Plan        Ashleigh Peng is a 72 y.o. female who presents today for office follow-up.    #1.  Hyperlipidemia  Her LDL remains higher than would be expected or ideal while on high intensity statin and Zetia. She is now consistently taking Crestor 40 mg daily as well as Zetia.  -LDL remained stable in the 90s.  For now this seems to be reasonably well at goal and I would like it to at least be under 100.  Next steps would be addition of PCSK9 inhibitor or Nexletol.  She does not have clearly documented ASCVD so I think that insurance approval on either of these medications might be challenging.  For now I think it is fine to continue on her current doses and I will plan to see her again in about 6 months.     #2.  Valvular heart disease   The patient has mitral valve prolapse which is minimal along with mild mitral regurgitation.  She also has a calcified trileaflet aortic valve with decreased motion but no hemodynamic aortic stenosis.  She has a very mild outflow murmur from her aortic valve.     #3.  Tobacco abuse   Cessation encouraged         I will plan to see her again in about 6 months.     Available medical records were reviewed and updated where appropriate including laboratory data, imaging studies, and previous outpatient and inpatient records.  Counseling/education performed.       Thank you for allowing me to participate in the care of this patient.    Kun Guzman MD     [facility] [place] Office: [phone]  [facility]: [facility]   12/21/2022

## 2022-12-21 NOTE — LETTER
December 21, 2022     Peter Solorzano MD  [address]  [address]  [facility] [address]    Patient: Ashleigh Peng  YOB: 1950  Date of Visit: 12/21/2022      Dear Dr. Solorzano:    Thank you for referring Ashleigh Peng to me for evaluation. Below are my notes for this consultation.    If you have questions, please do not hesitate to call me. I look forward to following your patient along with you.         Sincerely,        Kun Guzman MD        CC: No Recipients  Kun Guzman MD  12/21/2022 10:39 AM  Signed     [facility] Cardiology  [place] Office       Patient ID: Ashleigh Peng 72 y.o. female 1950  PCP: Peter Solorzano MD      HPI     Ashleigh Peng is a 72 y.o. female I the pleasure seen in the office today for routine cardiovascular follow-up.    She has a background of hyperlipidemia, mild mitral valve prolapse and regurgitation, tobacco abuse and prior breast cancer.    I last saw her about 6 months ago.  She has been on Crestor and Zetia and just had follow-up blood work showing cholesterol in a stable range.  She is feeling well and does not have any other new complaints.  She denies chest pain, shortness of breath, palpitations, syncope, lower extremity edema, or claudication.  She is still smoking.  She got a Fitbit and is planning to monitor her steps while she is in [place].  She has no other specific new complaints at this point.       Medications     Current Outpatient Medications   Medication Instructions   • aspirin 81 mg, oral, Daily   • calcium citrate-vitamin D3 (CITRACAL) 200 mg-6.25 mcg (250 unit) tablet 1 tablet, oral, Once in a while   • cholecalciferol (vitamin D3) 2,000 Units, oral, Daily   • cyanocobalamin (VITAMIN B12) 1,000 mcg, oral, Daily   • ezetimibe (ZETIA) 10 mg, oral, Daily   • multivitamin-iron-folic acid  mg-mcg tablet 1 tablet, oral, Daily   • rosuvastatin (CRESTOR) 40 mg, oral, Nightly   • ubidecarenone (COENZYME Q10 ORAL) 1 tablet, oral,  "Daily         Allergies     No known allergies     Vital Signs/Exam     Vitals:    12/21/22 0953   BP: 104/78   BP Location: Left upper arm   Patient Position: Sitting   Pulse: 77   SpO2: 98%   Weight: 61.2 kg (135 lb)   Height: 1.727 m (5' 8\")     BP Readings from Last 3 Encounters:   12/21/22 104/78   12/05/22 132/84   06/21/22 102/70     Wt Readings from Last 3 Encounters:   12/21/22 61.2 kg (135 lb)   12/05/22 62.4 kg (137 lb 9.6 oz)   06/21/22 59.4 kg (131 lb)        Gen: no distress  HEENT: no scleral icterus  Neck: no JVD, no carotid bruit  Lungs: clear bilaterally; no crackles, rhonchi, wheezing  Heart/Chest: regular rate and rhythm; very soft systolic ejection murmur heard at right upper sternal border  Extremities: no edema  Neuro: nonfocal, alert and oriented  Psych: normal mood and affect     Diagnostic Data   Diagnostic data personally reviewed. Available medical records were reviewed and updated where appropriate including laboratory data, imaging studies, and previous outpatient and inpatient records.  Counseling/education performed.      Labs:  Lab Results   Component Value Date    WBC 5.52 12/16/2022    HGB 13.9 12/16/2022     12/16/2022    ALT 27 12/16/2022    AST 28 12/16/2022     12/16/2022    K 4.3 12/16/2022     12/16/2022    CREATININE 0.9 12/16/2022    BUN 14 12/16/2022    CO2 27 12/16/2022    TSH 2.19 12/16/2022    HGBA1C 5.8 (H) 12/16/2022       Lab Results   Component Value Date    CHOL 170 12/16/2022    CHOL 168 12/31/2021    LDLCALC 94 12/16/2022    LDLCALC 97 12/31/2021    HDL 64 12/16/2022    HDL 61 12/31/2021    TRIG 61 12/16/2022    TRIG 48 12/31/2021       Echocardiogram:  1/22/2021  LV systolic function without segmental wall motion normalities, EF 60%  Abnormal diastolic function  Calcified leaflet aortic valve with decreased motion but no hemodynamically significant stenosis  Mildly thickened anterior mitral valve leaflet with minimal mitral valve prolapse and " mild MR, trace TR, estimated PASP of 26    Stress Tests:   Stress Myoview January 2019  Negative for ischemia and scar next normal LV wall motion EF in size  Stress EKG negative for ischemia  Good exercise tolerance          Cardiac cath:      Vascular Studies:  No results found for this or any previous visit from the past 365 days.    2016 carotid ultrasound normal      Peripheral:  No results found for this or any previous visit from the past 365 days.      Cardiac Monitor:     ECG independently reviewed: NSR, LAD     Assessment and Plan        Ashleigh Peng is a 72 y.o. female who presents today for office follow-up.    #1.  Hyperlipidemia  Her LDL remains higher than would be expected or ideal while on high intensity statin and Zetia. She is now consistently taking Crestor 40 mg daily as well as Zetia.  -LDL remained stable in the 90s.  For now this seems to be reasonably well at goal and I would like it to at least be under 100.  Next steps would be addition of PCSK9 inhibitor or Nexletol.  She does not have clearly documented ASCVD so I think that insurance approval on either of these medications might be challenging.  For now I think it is fine to continue on her current doses and I will plan to see her again in about 6 months.     #2.  Valvular heart disease   The patient has mitral valve prolapse which is minimal along with mild mitral regurgitation.  She also has a calcified trileaflet aortic valve with decreased motion but no hemodynamic aortic stenosis.  She has a very mild outflow murmur from her aortic valve.     #3.  Tobacco abuse   Cessation encouraged         I will plan to see her again in about 6 months.     Available medical records were reviewed and updated where appropriate including laboratory data, imaging studies, and previous outpatient and inpatient records.  Counseling/education performed.       Thank you for allowing me to participate in the care of this patient.    Kun Guzman MD      [facility] [place] Office: [phone]  [facility]: [facility]   12/21/2022

## 2023-03-17 ENCOUNTER — TELEPHONE (OUTPATIENT)
Dept: PULMONOLOGY | Facility: HOSPITAL | Age: 73
End: 2023-03-17
Payer: MEDICARE

## 2023-03-17 VITALS — WEIGHT: 135 LBS | BODY MASS INDEX: 20.46 KG/M2 | HEIGHT: 68 IN

## 2023-05-09 ENCOUNTER — HOSPITAL ENCOUNTER (OUTPATIENT)
Dept: RADIOLOGY | Age: 73
Discharge: HOME | End: 2023-05-09
Attending: FAMILY MEDICINE
Payer: MEDICARE

## 2023-05-09 DIAGNOSIS — Z87.891 PERSONAL HISTORY OF NICOTINE DEPENDENCE: ICD-10-CM

## 2023-05-09 DIAGNOSIS — Z12.2 SCREENING FOR LUNG CANCER: ICD-10-CM

## 2023-05-09 PROCEDURE — 71271 CT THORAX LUNG CANCER SCR C-: CPT

## 2023-05-10 NOTE — RESULT ENCOUNTER NOTE
Patient notified of low-dose CT scanning of the lungs.  It was benign.  Recommend repeat LDCT in 12 months.  There is mild to moderate coronary calcifications.  Patient already on aspirin and rosuvastatin.  No other treatment needed.  There are mild fibrotic changes along the periphery of the upper lobes of the lungs bilaterally.  Can [place] to her chronic history of smoking.  No further intervention required for this finding.  [Ankle Swelling (On Exam)] : present [Ankle Swelling Bilaterally] : bilaterally  [Varicose Veins Of Lower Extremities] : bilaterally [] : bilaterally [Purpura] : no purpura  [Petechiae] : no petechiae [Skin Ulcer] : ulcer [Skin Induration] : no induration [Alert] : alert [Oriented to Person] : oriented to person [Oriented to Place] : oriented to place [Oriented to Time] : oriented to time [Calm] : calm [de-identified] : NAD [FreeTextEntry1] : Wound with healthy pink granulation tissue, no erythema or drainage

## 2023-06-28 ENCOUNTER — TELEPHONE (OUTPATIENT)
Dept: SCHEDULING | Facility: CLINIC | Age: 73
End: 2023-06-28
Payer: MEDICARE

## 2023-06-28 ENCOUNTER — OFFICE VISIT (OUTPATIENT)
Dept: CARDIOLOGY | Facility: CLINIC | Age: 73
End: 2023-06-28
Payer: MEDICARE

## 2023-06-28 VITALS
OXYGEN SATURATION: 97 % | SYSTOLIC BLOOD PRESSURE: 100 MMHG | HEIGHT: 68 IN | DIASTOLIC BLOOD PRESSURE: 62 MMHG | BODY MASS INDEX: 20.76 KG/M2 | WEIGHT: 137 LBS | HEART RATE: 91 BPM

## 2023-06-28 DIAGNOSIS — R73.9 HYPERGLYCEMIA, UNSPECIFIED: ICD-10-CM

## 2023-06-28 DIAGNOSIS — I25.10 CORONARY ARTERY DISEASE, UNSPECIFIED VESSEL OR LESION TYPE, UNSPECIFIED WHETHER ANGINA PRESENT, UNSPECIFIED WHETHER NATIVE OR TRANSPLANTED HEART: Primary | ICD-10-CM

## 2023-06-28 DIAGNOSIS — R01.1 MURMUR: ICD-10-CM

## 2023-06-28 DIAGNOSIS — E78.2 MIXED HYPERLIPIDEMIA: ICD-10-CM

## 2023-06-28 PROCEDURE — 93000 ELECTROCARDIOGRAM COMPLETE: CPT | Performed by: INTERNAL MEDICINE

## 2023-06-28 PROCEDURE — 99214 OFFICE O/P EST MOD 30 MIN: CPT | Performed by: INTERNAL MEDICINE

## 2023-06-28 NOTE — LETTER
June 28, 2023     Peter Solorzano MD  [address]  [address]  [facility] [address]    Patient: Ashleigh Peng  YOB: 1950  Date of Visit: 6/28/2023      Dear Dr. Solorzano:    Thank you for referring Ashleigh Peng to me for evaluation. Below are my notes for this consultation.    If you have questions, please do not hesitate to call me. I look forward to following your patient along with you.         Sincerely,        Kun Guzman MD        CC: No Recipients    Kun Guzman MD  6/28/2023  4:22 PM  Signed     [facility]  [place] Office       Patient ID: Ashleigh Peng 72 y.o. female 1950  PCP: Peter Solorzano MD      HPI     Ashleigh Peng is a 72 y.o. female I the pleasure seen in the office today for routine cardiovascular follow-up.    She has a background of hyperlipidemia, coronary calcifications on CT scan, mild mitral valve prolapse and regurgitation, tobacco abuse and prior breast cancer.    I last saw her about 6 months ago.  She has been on Crestor and Zetia with LDL remaining in the 90s.  She tells me interestingly that her sons have high cholesterol and that one of them had some advanced lipid testing or genetic screening done with some abnormality and is on an injectable cholesterol medication.  She does not have the specifics of this information available to her right now but will be planning to get it for me.    Since her last visit with me she had a screening lung CT scan which incidentally showed mild to moderate coronary calcifications.  She is still smoking.  She has absolutely no symptoms and denies any chest pain, shortness of breath, palpitations, syncope, lower extremity edema, or claudication.         Medications     Current Outpatient Medications   Medication Instructions   • aspirin 81 mg, oral, Daily   • calcium carbonate (CALCIUM 500 ORAL) oral, Take one tablet 3 times a week.    • calcium citrate-vitamin D3 (CITRACAL) 200 mg-6.25 mcg (250 unit)  "tablet 1 tablet, oral, Once in a while   • cholecalciferol (vitamin D3) 2,000 Units, oral, Daily   • cyanocobalamin (VITAMIN B12) 1,000 mcg, oral, Daily   • ezetimibe (ZETIA) 10 mg, oral, Daily   • multivitamin-iron-folic acid  mg-mcg tablet 1 tablet, oral, Daily   • rosuvastatin (CRESTOR) 40 mg, oral, Nightly   • ubidecarenone (COENZYME Q10 ORAL) 1 tablet, oral, Daily         Allergies     No known allergies     Vital Signs/Exam     Vitals:    06/28/23 1327   BP: 100/62   BP Location: Left upper arm   Patient Position: Sitting   Pulse: 91   SpO2: 97%   Weight: 62.1 kg (137 lb)   Height: 1.727 m (5' 8\")     BP Readings from Last 3 Encounters:   06/28/23 100/62   12/21/22 104/78   12/05/22 132/84     Wt Readings from Last 3 Encounters:   06/28/23 62.1 kg (137 lb)   03/17/23 61.2 kg (135 lb)   12/21/22 61.2 kg (135 lb)        Gen: no distress  HEENT: no scleral icterus  Neck: no JVD, no carotid bruit  Lungs: clear bilaterally; no crackles, rhonchi, wheezing  Heart/Chest: regular rate and rhythm; very soft systolic ejection murmur heard at right upper sternal border  Extremities: no edema  Neuro: nonfocal, alert and oriented  Psych: normal mood and affect     Diagnostic Data   Diagnostic data personally reviewed. Available medical records were reviewed and updated where appropriate including laboratory data, imaging studies, and previous outpatient and inpatient records.  Counseling/education performed.      Labs:  Lab Results   Component Value Date    WBC 5.52 12/16/2022    HGB 13.9 12/16/2022     12/16/2022    ALT 27 12/16/2022    AST 28 12/16/2022     12/16/2022    K 4.3 12/16/2022     12/16/2022    CREATININE 0.9 12/16/2022    BUN 14 12/16/2022    CO2 27 12/16/2022    TSH 2.19 12/16/2022    HGBA1C 5.8 (H) 12/16/2022       Lab Results   Component Value Date    CHOL 170 12/16/2022    CHOL 168 12/31/2021    LDLCALC 94 12/16/2022    LDLCALC 97 12/31/2021    HDL 64 12/16/2022    HDL 61 " 12/31/2021    TRIG 61 12/16/2022    TRIG 48 12/31/2021       Echocardiogram:  1/22/2021  LV systolic function without segmental wall motion normalities, EF 60%  Abnormal diastolic function  Calcified leaflet aortic valve with decreased motion but no hemodynamically significant stenosis  Mildly thickened anterior mitral valve leaflet with minimal mitral valve prolapse and mild MR, trace TR, estimated PASP of 26    Stress Tests:   Stress Myoview January 2019  Negative for ischemia and scar next normal LV wall motion EF in size  Stress EKG negative for ischemia  Good exercise tolerance          Cardiac cath:      Vascular Studies:  No results found for this or any previous visit from the past 365 days.    2016 carotid ultrasound normal      Peripheral:  No results found for this or any previous visit from the past 365 days.      Cardiac Monitor:     ECG independently reviewed: NSR, LAD     Assessment and Plan        Ashleigh Peng is a 72 y.o. female who presents today for office follow-up.    #1.  Hyperlipidemia  Her LDL remains higher than would be expected or ideal while on high intensity statin and Zetia. She is now consistently taking Crestor 40 mg daily as well as Zetia.  -She tells me now over the son who has very elevated cholesterol and had some kind of advanced lipid testing showing significant abnormalities.  Given the family history that she is telling me about she perhaps could have a form of familial hypercholesterolemia.  She will work on getting this information from her son.  -LDL remained stable in the 90s.  Up until the screening lung CT scan we have never found evidence of clear ASCVD and she has no symptoms.  In the context of perhaps a genetic component of hyperlipidemia as well as coronary calcification seen on CT scan I have asked her to quantify the degree of coronary calcifications with coronary calcium scan.  In the event that she does have a particularly high burden of coronary calcium or if  there is evidence of Yeimy hypercholesterolemia and her family we would consider PCSK9 inhibitor on top of her current therapy.  She likely would be open to this.    #2.  Valvular heart disease   The patient has mitral valve prolapse which is minimal along with mild mitral regurgitation.  She also has a calcified trileaflet aortic valve with decreased motion but no hemodynamic aortic stenosis.  She has a very mild outflow murmur from her aortic valve.     #3.  Tobacco abuse   Cessation encouraged         I will plan to see her again in about 6 months.     Available medical records were reviewed and updated where appropriate including laboratory data, imaging studies, and previous outpatient and inpatient records.  Counseling/education performed.       Thank you for allowing me to participate in the care of this patient.    Kun Guzman MD     [facility] Office: [phone]  [facility]: [facility]   6/28/2023

## 2023-06-28 NOTE — PROGRESS NOTES
[facility]  [place] Office       Patient ID: Ashleigh Peng 72 y.o. female 1950  PCP: Peter Solorzano MD      HPI     Ashleigh Peng is a 72 y.o. female I the pleasure seen in the office today for routine cardiovascular follow-up.    She has a background of hyperlipidemia, coronary calcifications on CT scan, mild mitral valve prolapse and regurgitation, tobacco abuse and prior breast cancer.    I last saw her about 6 months ago.  She has been on Crestor and Zetia with LDL remaining in the 90s.  She tells me interestingly that her sons have high cholesterol and that one of them had some advanced lipid testing or genetic screening done with some abnormality and is on an injectable cholesterol medication.  She does not have the specifics of this information available to her right now but will be planning to get it for me.    Since her last visit with me she had a screening lung CT scan which incidentally showed mild to moderate coronary calcifications.  She is still smoking.  She has absolutely no symptoms and denies any chest pain, shortness of breath, palpitations, syncope, lower extremity edema, or claudication.         Medications     Current Outpatient Medications   Medication Instructions   • aspirin 81 mg, oral, Daily   • calcium carbonate (CALCIUM 500 ORAL) oral, Take one tablet 3 times a week.    • calcium citrate-vitamin D3 (CITRACAL) 200 mg-6.25 mcg (250 unit) tablet 1 tablet, oral, Once in a while   • cholecalciferol (vitamin D3) 2,000 Units, oral, Daily   • cyanocobalamin (VITAMIN B12) 1,000 mcg, oral, Daily   • ezetimibe (ZETIA) 10 mg, oral, Daily   • multivitamin-iron-folic acid  mg-mcg tablet 1 tablet, oral, Daily   • rosuvastatin (CRESTOR) 40 mg, oral, Nightly   • ubidecarenone (COENZYME Q10 ORAL) 1 tablet, oral, Daily         Allergies     No known allergies     Vital Signs/Exam     Vitals:    06/28/23 1327   BP: 100/62   BP Location: Left upper arm   Patient Position: Sitting  "  Pulse: 91   SpO2: 97%   Weight: 62.1 kg (137 lb)   Height: 1.727 m (5' 8\")     BP Readings from Last 3 Encounters:   06/28/23 100/62   12/21/22 104/78   12/05/22 132/84     Wt Readings from Last 3 Encounters:   06/28/23 62.1 kg (137 lb)   03/17/23 61.2 kg (135 lb)   12/21/22 61.2 kg (135 lb)        Gen: no distress  HEENT: no scleral icterus  Neck: no JVD, no carotid bruit  Lungs: clear bilaterally; no crackles, rhonchi, wheezing  Heart/Chest: regular rate and rhythm; very soft systolic ejection murmur heard at right upper sternal border  Extremities: no edema  Neuro: nonfocal, alert and oriented  Psych: normal mood and affect     Diagnostic Data   Diagnostic data personally reviewed. Available medical records were reviewed and updated where appropriate including laboratory data, imaging studies, and previous outpatient and inpatient records.  Counseling/education performed.      Labs:  Lab Results   Component Value Date    WBC 5.52 12/16/2022    HGB 13.9 12/16/2022     12/16/2022    ALT 27 12/16/2022    AST 28 12/16/2022     12/16/2022    K 4.3 12/16/2022     12/16/2022    CREATININE 0.9 12/16/2022    BUN 14 12/16/2022    CO2 27 12/16/2022    TSH 2.19 12/16/2022    HGBA1C 5.8 (H) 12/16/2022       Lab Results   Component Value Date    CHOL 170 12/16/2022    CHOL 168 12/31/2021    LDLCALC 94 12/16/2022    LDLCALC 97 12/31/2021    HDL 64 12/16/2022    HDL 61 12/31/2021    TRIG 61 12/16/2022    TRIG 48 12/31/2021       Echocardiogram:  1/22/2021  LV systolic function without segmental wall motion normalities, EF 60%  Abnormal diastolic function  Calcified leaflet aortic valve with decreased motion but no hemodynamically significant stenosis  Mildly thickened anterior mitral valve leaflet with minimal mitral valve prolapse and mild MR, trace TR, estimated PASP of 26    Stress Tests:   Stress Myoview January 2019  Negative for ischemia and scar next normal LV wall motion EF in size  Stress EKG " negative for ischemia  Good exercise tolerance          Cardiac cath:      Vascular Studies:  No results found for this or any previous visit from the past 365 days.    2016 carotid ultrasound normal      Peripheral:  No results found for this or any previous visit from the past 365 days.      Cardiac Monitor:     ECG independently reviewed: NSR, LAD     Assessment and Plan        Ashleigh Peng is a 72 y.o. female who presents today for office follow-up.    #1.  Hyperlipidemia  Her LDL remains higher than would be expected or ideal while on high intensity statin and Zetia. She is now consistently taking Crestor 40 mg daily as well as Zetia.  -She tells me now over the son who has very elevated cholesterol and had some kind of advanced lipid testing showing significant abnormalities.  Given the family history that she is telling me about she perhaps could have a form of familial hypercholesterolemia.  She will work on getting this information from her son.  -LDL remained stable in the 90s.  Up until the screening lung CT scan we have never found evidence of clear ASCVD and she has no symptoms.  In the context of perhaps a genetic component of hyperlipidemia as well as coronary calcification seen on CT scan I have asked her to quantify the degree of coronary calcifications with coronary calcium scan.  In the event that she does have a particularly high burden of coronary calcium or if there is evidence of Yeimy hypercholesterolemia and her family we would consider PCSK9 inhibitor on top of her current therapy.  She likely would be open to this.    #2.  Valvular heart disease   The patient has mitral valve prolapse which is minimal along with mild mitral regurgitation.  She also has a calcified trileaflet aortic valve with decreased motion but no hemodynamic aortic stenosis.  She has a very mild outflow murmur from her aortic valve.     #3.  Tobacco abuse   Cessation encouraged         I will plan to see her again in  about 6 months.     Available medical records were reviewed and updated where appropriate including laboratory data, imaging studies, and previous outpatient and inpatient records.  Counseling/education performed.       Thank you for allowing me to participate in the care of this patient.    Kun Guzman MD     [facility] Office: [phone]  [facility]: [facility]   6/28/2023

## 2023-06-28 NOTE — TELEPHONE ENCOUNTER
Pt calling requesting Dr. Guzman's e-mail. States at they last OV the were discussing something regarding her grandson's test and was told to e-mail the Dr. Guzman with the information.     Stated if he would like to email her that would be find as well.  [email or web address]       Pt can be reached at [phone] or [phone]

## 2023-06-29 NOTE — TELEPHONE ENCOUNTER
Called patient, unable to send through portal. Stated was having it forwarded to MA who will forward to Dr. Guzman.

## 2023-06-30 NOTE — TELEPHONE ENCOUNTER
Followed up with patient via [employer] to let her know I never received the test results via e-mail.

## 2023-08-23 ENCOUNTER — OFFICE VISIT (OUTPATIENT)
Dept: PRIMARY CARE | Facility: CLINIC | Age: 73
End: 2023-08-23
Payer: MEDICARE

## 2023-08-23 ENCOUNTER — TELEPHONE (OUTPATIENT)
Dept: PRIMARY CARE | Facility: CLINIC | Age: 73
End: 2023-08-23

## 2023-08-23 VITALS
HEART RATE: 100 BPM | HEIGHT: 68 IN | BODY MASS INDEX: 20.43 KG/M2 | WEIGHT: 134.8 LBS | DIASTOLIC BLOOD PRESSURE: 68 MMHG | SYSTOLIC BLOOD PRESSURE: 112 MMHG | OXYGEN SATURATION: 95 % | RESPIRATION RATE: 16 BRPM

## 2023-08-23 DIAGNOSIS — H01.004 BLEPHARITIS OF LEFT UPPER EYELID, UNSPECIFIED TYPE: Primary | ICD-10-CM

## 2023-08-23 PROCEDURE — 99213 OFFICE O/P EST LOW 20 MIN: CPT

## 2023-08-23 RX ORDER — ERYTHROMYCIN 5 MG/G
OINTMENT OPHTHALMIC NIGHTLY
Qty: 305 G | Refills: 0 | Status: SHIPPED | OUTPATIENT
Start: 2023-08-23 | End: 2023-08-30

## 2023-08-23 NOTE — TELEPHONE ENCOUNTER
[facility] Appointment Request   Provider: DR Solorzano  Appointment Type: SDS  Reason for Visit: Possible Pink eye is both eyes weeping   Available Day and Time: any, ( offered DR Mejia and declined   Best Contact Number:[phone]    The practice will reach out to schedule your appointment within the next 2 business days.

## 2023-09-11 RX ORDER — ROSUVASTATIN CALCIUM 40 MG/1
40 TABLET, COATED ORAL NIGHTLY
Qty: 90 TABLET | Refills: 3 | Status: SHIPPED | OUTPATIENT
Start: 2023-09-11 | End: 2024-10-03 | Stop reason: SDUPTHER

## 2023-09-11 RX ORDER — EZETIMIBE 10 MG/1
10 TABLET ORAL DAILY
Qty: 90 TABLET | Refills: 3 | Status: SHIPPED | OUTPATIENT
Start: 2023-09-11 | End: 2024-10-03 | Stop reason: SDUPTHER

## 2023-09-11 NOTE — TELEPHONE ENCOUNTER
Medicine Refill Request    Last Office Visit: Visit date not found   Last Consult Visit: Visit date not found  Last Telemedicine Visit: Visit date not found    Next Appointment: Visit date not found      Current Outpatient Medications:     aspirin 81 mg enteric coated tablet, Take 81 mg by mouth daily. , Disp: , Rfl:     calcium carbonate (CALCIUM 500 ORAL), Take by mouth. Take one tablet 3 times a week., Disp: , Rfl:     calcium citrate-vitamin D3 (CITRACAL) 200 mg-6.25 mcg (250 unit) tablet, Take 1 tablet by mouth. Once in a while, Disp: , Rfl:     cholecalciferol, vitamin D3, 2,000 unit tablet, Take 2,000 Units by mouth daily., Disp: , Rfl:     cyanocobalamin (VITAMIN B12) 1,000 mcg tablet, Take 1,000 mcg by mouth daily., Disp: , Rfl:     ezetimibe (ZETIA) 10 mg tablet, Take 1 tablet (10 mg total) by mouth daily., Disp: 90 tablet, Rfl: 3    multivitamin-iron-folic acid  mg-mcg tablet, Take 1 tablet by mouth daily., Disp: , Rfl:     rosuvastatin (CRESTOR) 40 mg tablet, Take 1 tablet (40 mg total) by mouth nightly., Disp: 90 tablet, Rfl: 3    ubidecarenone (COENZYME Q10 ORAL), Take 1 tablet by mouth daily., Disp: , Rfl:       BP Readings from Last 3 Encounters:   08/23/23 112/68   06/28/23 100/62   12/21/22 104/78       Recent Lab results:  Lab Results   Component Value Date    CHOL 170 12/16/2022   ,   Lab Results   Component Value Date    HDL 64 12/16/2022   ,   Lab Results   Component Value Date    LDLCALC 94 12/16/2022   ,   Lab Results   Component Value Date    TRIG 61 12/16/2022        Lab Results   Component Value Date    GLUCOSE 103 (H) 12/16/2022   ,   Lab Results   Component Value Date    HGBA1C 5.8 (H) 12/16/2022         Lab Results   Component Value Date    CREATININE 0.9 12/16/2022       Lab Results   Component Value Date    TSH 2.19 12/16/2022           Lab Results   Component Value Date    HGBA1C 5.8 (H) 12/16/2022

## 2023-10-26 ENCOUNTER — OFFICE VISIT (OUTPATIENT)
Dept: PRIMARY CARE | Facility: CLINIC | Age: 73
End: 2023-10-26
Payer: MEDICARE

## 2023-10-26 VITALS
SYSTOLIC BLOOD PRESSURE: 118 MMHG | BODY MASS INDEX: 20 KG/M2 | DIASTOLIC BLOOD PRESSURE: 78 MMHG | RESPIRATION RATE: 16 BRPM | HEIGHT: 68 IN | HEART RATE: 88 BPM | WEIGHT: 132 LBS | OXYGEN SATURATION: 98 %

## 2023-10-26 DIAGNOSIS — Z12.2 SCREENING FOR LUNG CANCER: ICD-10-CM

## 2023-10-26 DIAGNOSIS — M85.851 OSTEOPENIA OF BOTH HIPS: Chronic | ICD-10-CM

## 2023-10-26 DIAGNOSIS — C50.911 MALIGNANT NEOPLASM OF RIGHT FEMALE BREAST, UNSPECIFIED ESTROGEN RECEPTOR STATUS, UNSPECIFIED SITE OF BREAST (CMS/HCC): ICD-10-CM

## 2023-10-26 DIAGNOSIS — F17.210 NICOTINE DEPENDENCE, CIGARETTES, UNCOMPLICATED: ICD-10-CM

## 2023-10-26 DIAGNOSIS — Z00.00 MEDICARE ANNUAL WELLNESS VISIT, SUBSEQUENT: Primary | ICD-10-CM

## 2023-10-26 DIAGNOSIS — E78.2 MIXED HYPERLIPIDEMIA: Chronic | ICD-10-CM

## 2023-10-26 DIAGNOSIS — M85.852 OSTEOPENIA OF BOTH HIPS: Chronic | ICD-10-CM

## 2023-10-26 PROCEDURE — 99214 OFFICE O/P EST MOD 30 MIN: CPT | Performed by: FAMILY MEDICINE

## 2023-10-26 RX ORDER — NEOMYCIN SULFATE, POLYMYXIN B SULFATE, AND DEXAMETHASONE 3.5; 10000; 1 MG/G; [USP'U]/G; MG/G
OINTMENT OPHTHALMIC
COMMUNITY
Start: 2023-10-06 | End: 2023-10-26 | Stop reason: ALTCHOICE

## 2023-10-26 RX ORDER — ERYTHROMYCIN 5 MG/G
OINTMENT OPHTHALMIC
COMMUNITY
Start: 2023-08-23 | End: 2023-10-26 | Stop reason: ALTCHOICE

## 2023-10-26 SDOH — HEALTH STABILITY: PHYSICAL HEALTH: ON AVERAGE, HOW MANY DAYS PER WEEK DO YOU ENGAGE IN MODERATE TO STRENUOUS EXERCISE (LIKE A BRISK WALK)?: 0 DAYS

## 2023-10-26 SDOH — HEALTH STABILITY: PHYSICAL HEALTH: ON AVERAGE, HOW MANY MINUTES DO YOU ENGAGE IN EXERCISE AT THIS LEVEL?: 0 MIN

## 2023-10-26 SDOH — ECONOMIC STABILITY: FOOD INSECURITY: WITHIN THE PAST 12 MONTHS, THE FOOD YOU BOUGHT JUST DIDN'T LAST AND YOU DIDN'T HAVE MONEY TO GET MORE.: NEVER TRUE

## 2023-10-26 SDOH — ECONOMIC STABILITY: FOOD INSECURITY: WITHIN THE PAST 12 MONTHS, YOU WORRIED THAT YOUR FOOD WOULD RUN OUT BEFORE YOU GOT MONEY TO BUY MORE.: NEVER TRUE

## 2023-10-26 SDOH — ECONOMIC STABILITY: TRANSPORTATION INSECURITY
IN THE PAST 12 MONTHS, HAS LACK OF TRANSPORTATION KEPT YOU FROM MEETINGS, WORK, OR FROM GETTING THINGS NEEDED FOR DAILY LIVING?: NO

## 2023-10-26 SDOH — ECONOMIC STABILITY: TRANSPORTATION INSECURITY
IN THE PAST 12 MONTHS, HAS THE LACK OF TRANSPORTATION KEPT YOU FROM MEDICAL APPOINTMENTS OR FROM GETTING MEDICATIONS?: NO

## 2023-10-26 ASSESSMENT — ENCOUNTER SYMPTOMS
RHINORRHEA: 0
SLEEP DISTURBANCE: 0
TROUBLE SWALLOWING: 0
DYSPHORIC MOOD: 0
LIGHT-HEADEDNESS: 0
VOMITING: 0
FATIGUE: 0
FREQUENCY: 0
CHEST TIGHTNESS: 0
JOINT SWELLING: 0
WEAKNESS: 0
HEADACHES: 0
NAUSEA: 0
CHILLS: 0
CONFUSION: 0
APPETITE CHANGE: 0
EYE DISCHARGE: 0
COUGH: 0
ABDOMINAL PAIN: 0
DIZZINESS: 0
NECK PAIN: 0
SORE THROAT: 0
BACK PAIN: 0
SINUS PRESSURE: 0
BLOOD IN STOOL: 0
MYALGIAS: 0
HEMATURIA: 0
ARTHRALGIAS: 0
EYE REDNESS: 0
DIFFICULTY URINATING: 0
EYE PAIN: 0
SPEECH DIFFICULTY: 0
DIARRHEA: 0
EYE ITCHING: 0
UNEXPECTED WEIGHT CHANGE: 0
FEVER: 0
SHORTNESS OF BREATH: 0
PALPITATIONS: 0
NERVOUS/ANXIOUS: 0
CONSTIPATION: 0
NUMBNESS: 0
WHEEZING: 0
DYSURIA: 0
BRUISES/BLEEDS EASILY: 0

## 2023-10-26 ASSESSMENT — LIFESTYLE VARIABLES
HOW OFTEN DO YOU HAVE SIX OR MORE DRINKS ON ONE OCCASION: NEVER
HOW MANY STANDARD DRINKS CONTAINING ALCOHOL DO YOU HAVE ON A TYPICAL DAY: 1 OR 2
HOW OFTEN DO YOU HAVE A DRINK CONTAINING ALCOHOL: 2-3 TIMES A WEEK

## 2023-10-26 ASSESSMENT — MINI COG
TOTAL SCORE: 4
COMPLETED: YES

## 2023-10-26 ASSESSMENT — SOCIAL DETERMINANTS OF HEALTH (SDOH): HOW HARD IS IT FOR YOU TO PAY FOR THE VERY BASICS LIKE FOOD, HOUSING, MEDICAL CARE, AND HEATING?: NOT HARD AT ALL

## 2023-10-26 ASSESSMENT — PATIENT HEALTH QUESTIONNAIRE - PHQ9: SUM OF ALL RESPONSES TO PHQ9 QUESTIONS 1 & 2: 0

## 2023-10-26 NOTE — ASSESSMENT & PLAN NOTE
· The patient is currently stable.   · Bloodwork was ordered including a complete blood count, complete metabolic profile, thyroid stimulating hormone, and a lipid profile.   · Further recommendations will be provided to the patient based on the results of the blood tests.   · The patient should exercise at 70 percent of her maximal heart rate for 2.5 hours per week.   · All the recommend vaccinations are up to date.   · The patient should be evaluated by the ophthalmologist every year and dentist every 6 months.   · The patient was advised to protect the skin by applying suntan lotion containing an SPF > 30 every 2 hours while in sun or after swimming. Instructed to avoid prolonged direct sun exposure as much as possible.   · The patient's body mass index is normal.  · The patient's  mammogram is up to date.   · The patient's DEXA scan is due at this time   · The patient's colonoscopy is up to date.   · Advised to consume 1000 mg of calcium daily through the diet. If the patient is unable to consume 1000 mg through the diet, then taking calcium supplements is an acceptable alternative. The patient was informed not to consume more than 500 mg of a calcium supplement at a time.

## 2023-10-26 NOTE — PATIENT INSTRUCTIONS
Women's Personalized Prevention Plan Services (PPPS)       Preventive Services Checklist (Assumes Average Risk Unless Otherwise Noted)  Preventive Service Target Population and Frequency Last Done Date Due   Abd Aortic Aneurysm Screening Family history of AAA (covered once)   Not needed    Alcohol Misuse Screening As necessary for those at risk (covered annually) 10/26/2023 10/2024   Breast Cancer Screening Mammogram every 1-2yrs 50-69yo; every 1-2yrs 35-48yo if patient desires after weighing potential harms and benefits (covered once 35-40yo, annually >=39yo) 10/17/2023 10/2024   Cholesterol Screening Both risk factors: 1) >=19yo and 2)  increased risk coronary artery disease (covered every 5 years) 12/16/2022 Ordered    Colorectal Cancer Screening >=51yo: Colonoscopy every 10 years, FIT annually or Cologuard every 3 years (up to 84yo for Cologuard) 11/08/2016 11/2026   Diabetes Screening Any 1 risk factor: hypertension, dyslipidemia, obesity, high glucose; or Any 2 risk factors: >=64 yo, overweight, family history diabetes, history gestational diabetes or delivery of infant >9lbs (covered every 6 months) 12/16/2022 Ordered    Glaucoma Screening Any 1 risk factor: 1) diabetes, 2) family history glaucoma, 3)  >=51yo, 4)  American >=64yo (covered annually) 05/2023 05/2024   Hepatitis C Screening Any 1 risk factor: 1) born between [id number], 2) blood transfusion before 1992, 3) current or past injection drug use (covered once for average risk, annually for high risk) 12/12/2018 Done    Lung Cancer Screening Low-dose chest CT if all 3 risk factors: 1) 55-76yo, 2) smoker or quit within last 15y, 3) >=30 pack years (covered annually)  05/09/2023 05/2024   Osteoporosis Screening >=64yo (covered every 24 months)  <64yo if any 1 risk factor: 1) estrogen deficient and at risk for osteoporosis; 2) established osteoporosis on treatment; 3) x-rays show possible osteoporosis, osteopenia or vertebral  "fractures; 4) on steroid or planning to start; 5) primary hyperparathyroidism (covered as medically necessary) 12/08/2021 Ordered    Sexually Transmitted Diseases (STDs) As necessary chlamydia, gonorrhea, syphilis, hepatitis B (covered annually if not pregnant, more often in pregnancy)  HIV if any 1 risk factor present: 1) <16yo or >64yo and at increased risk, 2) 15-64yo and ask for it, or 3) pregnant (covered annually if not pregnant, up to 3x in pregnancy)   Low Risk    Vaccine: Hepatitis B As necessary if medium or high risk (series covered once)   Not needed     Vaccine: Influenza All patients without contraindications (covered annually.) 10/18/2023 Fall 2024   Vaccine: Pneumococcal Pneumovax + Prevnar (both covered, >=11 months apart) Prevnar   10/26/2015  Pneumovax  11/26/2019   Done    Vaccine: Shingrix (Shingles) >= 51yo without contraindications             (2 doses, 2 months apart) Zostavax  12/09/2015  Shingrix   04/12/2021  08/10/2021 Done    TDAP Every 10 years   04/12/2021 04/2031                                    Women's Personalized Prevention Plan Services (PPPS)                                                          Prints to AVS                                          Health Risk Factors and Interventions  \"x\" Indicates risk is associated with this patient Risk Factor Recommended Interventions    N/A   Overweight     N/A  Hypertension     N/A  Diabetes     N/A  Fall Risk     N/A  Tobacco Use                Problem List Items Addressed This Visit       Hyperlipidemia (Chronic)    Relevant Orders    CBC and Differential    Comprehensive metabolic panel    Lipid panel    TSH 3rd Generation    Osteopenia of both hips (Chronic)     Check DEXA scan          Relevant Orders    DEXA BONE DENSITY    Malignant neoplasm of female breast (CMS/formerly [facility])    Medicare annual wellness visit, subsequent - Primary     The patient is currently stable.   Bloodwork was ordered including a complete blood count, complete " metabolic profile, thyroid stimulating hormone, and a lipid profile.   Further recommendations will be provided to the patient based on the results of the blood tests.   The patient should exercise at 70 percent of her maximal heart rate for 2.5 hours per week.   All the recommend vaccinations are up to date.   The patient should be evaluated by the ophthalmologist every year and dentist every 6 months.   The patient was advised to protect the skin by applying suntan lotion containing an SPF > 30 every 2 hours while in sun or after swimming. Instructed to avoid prolonged direct sun exposure as much as possible.   The patient's body mass index is normal.  The patient's  mammogram is up to date.   The patient's DEXA scan is due at this time   The patient's colonoscopy is up to date.   Advised to consume 1000 mg of calcium daily through the diet. If the patient is unable to consume 1000 mg through the diet, then taking calcium supplements is an acceptable alternative. The patient was informed not to consume more than 500 mg of a calcium supplement at a time.             Other Visit Diagnoses       Screening for lung cancer        Relevant Orders    CT LUNG SCREENING WITHOUT IV CONTRAST    Nicotine dependence, cigarettes, uncomplicated        Relevant Orders    CT LUNG SCREENING WITHOUT IV CONTRAST               Health Risk Factors with Personalized Education:  ----------------------------------------------------------------------------------------------------------------------  Controlling Your Cholesterol  Reduce the amount of saturated and trans fat in your diet.  Limit intake of red meat.  Consume only low-fat or non-fat/skim dairy.  Limit fried food.  Cook with vegetable oils.  Reduce your intake of sugary foods, sugary drinks and alcohol.  Eat a diet high in fruit, vegetables and whole grains.  Get protein from fish, poultry and a small portion of nuts.  Stay active.  Try to get at least 90 to 150 minutes of  exercise per week.  Try brisk walking, swimming, bicycling or dancing.  Maintain a healthy weight by balancing your diet and exercise.  If you have been prescribed medication, take it regularly and exactly as prescribed. Let your PCP know if you have any problems or questions about your medication.  Its important to know your cholesterol numbers.  When recommended by your PCP, get the cholesterol blood test.  ---------------------------------------------------------------------------------------------------------------------   Controlling Your Osteoporosis, Strengthening Your Bones  Try to get at least 90 to 150 minutes of weight-bearing exercise per week.  Ensure intake of at least 1200mg of calcium per day.  Eat foods high in calcium like milk and other dairy, green vegetables, fruit, canned fish with soft and edible bones, nuts, calcium-set tofu.  Some foods are calcium-fortified, like bread, cereal, fruit juices and mineral water.  Help your body make vitamin D by getting 10-15 minutes per day of sunlight.    Ensure intake of at least 600IU of vitamin D per day.  Eat foods high in vitamin D like oily fish (salmon, sardines, mackerel) and eggs.  Some foods are fortified with vitamin D, like dairy and cereals.  Avoid high amounts of caffeine and salt, since they can cause the body to loose calcium.  Limit alcohol intake, since it is associated with weaker bones and is associated with falls and fractures.  Limit intake of fizzy drinks.  If you have been prescribed medication, take it regularly and exactly as prescribed.  Let your PCP know if you have any problems or questions about your medication  ----------------------------------------------------------------------------------------------------------------------  Controlling Your Osteopenia, Strengthening Your Bones  Try to get at least 90 to 150 minutes of weight-bearing exercise per week.  Ensure intake of at least 1200mg of calcium per day.  Eat foods high  in calcium like milk and other dairy, green vegetables, fruit, canned fish with soft and edible bones, nuts, calcium-set tofu.  Some foods are calcium-fortified, like bread, cereal, fruit juices and mineral water.  Help your body make vitamin D by getting 10-15 minutes per day of sunlight.    Ensure intake of at least 600IU of vitamin D per day.  Eat foods high in vitamin D like oily fish (salmon, sardines, mackerel) and eggs.  Some foods are fortified with vitamin D, like dairy and cereals.  Avoid high amounts of caffeine and salt, since they can cause the body to loose calcium.  Limit alcohol intake, since it is associated with weaker bones and is associated with falls and fractures.  Limit intake of fizzy drinks.  If you have been prescribed medication, take it regularly and exactly as prescribed.  Let your PCP know if you have any problems or questions about your medication  ----------------------------------------------------------------------------------------------------------------------  Reducing Your Risk of Falls  Tell your PCP if any of your medications make you feel tired, dizzy, lightheaded or off-balance.  Maintain coordination, flexibility and balance by ensuring regular physical activity.  Limit alcohol intake to 1 drink per day.  Consider avoiding all alcohol intake.  Ensure good vision.  Visit an ophthalmologist or optometrist regularly for vision screening or to make sure your glasses / contact lens prescription is correct.  If you need glasses or contacts, wear them.  When you get new glasses or contacts, take time to get used to them.  Do not wear sunglasses or tinted lenses when indoors.  Ensure good hearing.  Have your hearing checked if you are having trouble hearing, or family and friends think you cannot hear them.  If you need a hearing aid, be sure it fits well and wear it.  Get enough rest.  Ensure about 7-9 hours of sleep every day.  Get up slowly from your bed or chairs.  Do not start  walking until you are sure you feel steady.  Wear non-skid, rubber-soled, low-heeled shoes.  Do not walk in socks, or in shoes and slippers with smooth soles.  If your PCP or therapist recommends using a cane or walker, use it regularly.  Make your home safer.  Increase lighting throughout the house, especially at the top and bottom of stairs.  Ensure lighting is easily turned on when getting up in the middle of the night.  Make sure there are two secure rails on all stairs.  Install grab bars in the bathtub / shower and near the toilet.  Consider using a shower chair and / or a hand-held shower.  Spread sand or salt on icy surfaces.  Beware of wet surfaces, which can be icy.  Tell your PCP if you have fallen.

## 2023-10-26 NOTE — PROGRESS NOTES
Subjective     Ashleigh Peng is a 73 y.o. female who presents for a subsequent annual wellness visit.     Patient Care Team:  Peter Solorzano MD as PCP - General (Family Medicine)  Pepe Jasmine MD as Consulting Physician (Obstetrics and Gynecology)  Kun Guzman MD as Consulting Physician (Cardiology)  Donis Patten MD as Consulting Physician (Ophthalmology)  Jon Solorzano MD as Consulting Physician (Oncology)  Mtat Bacon MD as Consulting Physician (Dermatology)  Brady Schwab MD as Consulting Physician (Otolaryngology)  Chelsea Dobbs RRT as Respiratory Therapist (Pulmonary)  Shriaz Kulkarni MD as Consulting Physician (Dermatology)    Comprehensive Medical and Social History  Patient Active Problem List   Diagnosis    Hyperlipidemia    Malignant neoplasm of unspecified site of right female breast (CMS/HCC)    Hyperglycemia    Medicare annual wellness visit, subsequent    Diastolic dysfunction    Malignant neoplasm of female breast (CMS/HCC)    Mitral regurgitation    Murmur    Osteopenia of both hips    Asymmetrical hearing loss    Blepharitis of left upper eyelid     Past Medical History:   Diagnosis Date    Diastolic dysfunction 12/11/2019    Seen on echocardiogram in 12/2019. Has EF of 60%, mild mitral regurgitation and mild tricuspid regurgitation. Has minimally elevated transaortic gradient. Echocardiogram on 1/22/2021: Normal left ventricular systolic function without segmental wall motion abnormalities.  Ejection fraction 60%.  Abnormal diastolic function.  Calcified trileaflet aortic valve with decreased motion but no significant    Hyperglycemia 12/12/2018    Hyperlipidemia 10/26/2015    Cardiologist: Dr. Guzman. Managed with Rosuvastatin, Ezetimibe, and Lovaza.  Requires periodic monitoring of lipids and liver function tests.  Advised to follow low fat diet and to keep BMI < 25.  Advised to exercise for 2.5 hours per week.      Malignant neoplasm of female  breast (CMS/[facility]) 3/1/2007    Oncologist: Dr. Jon Solorzano Surgeon: Dr. Bragg Right lumpectomy. Dx  BREAST CANCER, Dx Date 3/2007, Histology  , Stage @ Dx  T1,N0, ER/NV+ HERII+, Disease Status  SIN Treatment was radiation and Arimidex x 5 years Managed with yearly mammograms.    Osteopenia of both hips 10/13/2021    DEXA scan done in 10/2018. LS -0.1, LH -1.7, RH -1.5.  DEXA Scan 12/2021 with LS -0.2, LH -1.5, RH -1.4.  Recheck DEXA Scan in 12/2023 Managed with vitamin D3 and weight bearing exercise.      Past Surgical History:   Procedure Laterality Date    BREAST LUMPECTOMY Right 2007    Dr. Bragg    [facility] [place]    right leg    WISDOM TOOTH EXTRACTION       Allergies   Allergen Reactions    No Known Allergies      Current Outpatient Medications   Medication Sig Dispense Refill    aspirin 81 mg enteric coated tablet Take 81 mg by mouth daily.       cholecalciferol, vitamin D3, 2,000 unit tablet Take 2,000 Units by mouth daily.      cyanocobalamin (VITAMIN B12) 1,000 mcg tablet Take 1,000 mcg by mouth daily.      ezetimibe (ZETIA) 10 mg tablet TAKE 1 TABLET BY MOUTH  DAILY 90 tablet 3    multivitamin-iron-folic acid  mg-mcg tablet Take 1 tablet by mouth daily.      rosuvastatin (CRESTOR) 40 mg tablet TAKE 1 TABLET BY MOUTH AT  NIGHT 90 tablet 3    ubidecarenone (COENZYME Q10 ORAL) Take 1 tablet by mouth daily.       No current facility-administered medications for this visit.     Social History     Tobacco Use    Smoking status: Every Day     Packs/day: 1.00     Years: 55.00     Additional pack years: 0.00     Total pack years: 55.00     Types: Cigarettes     Start date: 1968     Passive exposure: Past    Smokeless tobacco: Current    Tobacco comments:     Overall ave=1ppd, currently about 1/2   Vaping Use    Vaping Use: Never used   Substance Use Topics    Alcohol use: Yes     Alcohol/week: 2.0 - 5.0 standard drinks of alcohol     Types: 2 - 5 Standard drinks or equivalent per week      Comment: soc    Drug use: Never     Family History   Problem Relation Age of Onset    Kidney disease Biological Mother     Heart disease Biological Mother     Hyperlipidemia Biological Mother     COPD Biological Father     Other Biological Sister         Meningioma    Hyperlipidemia Biological Brother     Throat cancer Biological Brother     No Known Problems Biological Brother     Brain cancer Paternal Grandmother     Lung cancer Paternal Grandfather      Review of Systems   Constitutional: Negative for appetite change, chills, fatigue, fever and unexpected weight change.   HENT: Negative for congestion, ear pain, hearing loss, nosebleeds, postnasal drip, rhinorrhea, sinus pressure, sneezing, sore throat and trouble swallowing.    Eyes: Negative for pain, discharge, redness, itching and visual disturbance.   Respiratory: Negative for cough, chest tightness, shortness of breath and wheezing.    Cardiovascular: Positive for chest pain. Negative for palpitations and leg swelling.   Gastrointestinal: Negative for abdominal pain, blood in stool, constipation, diarrhea, nausea and vomiting.   Endocrine: Negative for cold intolerance and heat intolerance.   Genitourinary: Negative for difficulty urinating, dysuria, frequency, hematuria, urgency, vaginal bleeding and vaginal discharge.        Mild urinary incontinence   Musculoskeletal: Negative for arthralgias, back pain, gait problem, joint swelling, myalgias and neck pain.   Skin: Negative for rash.   Allergic/Immunologic: Negative for environmental allergies.   Neurological: Negative for dizziness, syncope, speech difficulty, weakness, light-headedness, numbness and headaches.   Hematological: Does not bruise/bleed easily.   Psychiatric/Behavioral: Negative for confusion, dysphoric mood and sleep disturbance. The patient is not nervous/anxious.          Objective   Vitals  Vitals:    10/26/23 1109   BP: 118/78   BP Location: Left upper arm   Patient  "Position: Sitting   Pulse: 88   Resp: 16   SpO2: 98%   Weight: 59.9 kg (132 lb)   Height: 1.727 m (5' 8\")     Body mass index is 20.07 kg/m².    Wt Readings from Last 3 Encounters:   10/26/23 59.9 kg (132 lb)   08/23/23 61.1 kg (134 lb 12.8 oz)   06/28/23 62.1 kg (137 lb)     Physical Exam  Constitutional:       General: She is not in acute distress.     Appearance: Normal appearance. She is well-developed. She is not ill-appearing, toxic-appearing or diaphoretic.   HENT:      Head: Normocephalic and atraumatic.      Right Ear: Tympanic membrane, ear canal and external ear normal.      Left Ear: Tympanic membrane, ear canal and external ear normal.      Nose: No mucosal edema, congestion or rhinorrhea.      Right Sinus: No maxillary sinus tenderness or frontal sinus tenderness.      Left Sinus: No maxillary sinus tenderness or frontal sinus tenderness.      Mouth/Throat:      Mouth: No oral lesions.      Pharynx: Uvula midline. No oropharyngeal exudate or posterior oropharyngeal erythema.      Tonsils: No tonsillar exudate.   Eyes:      General: Lids are normal. No scleral icterus.        Right eye: No discharge.         Left eye: No discharge.      Conjunctiva/sclera:      Right eye: Right conjunctiva is not injected. No exudate.     Left eye: Left conjunctiva is not injected. No exudate.     Pupils: Pupils are equal, round, and reactive to light.   Neck:      Thyroid: No thyroid mass or thyromegaly.      Vascular: No carotid bruit.   Cardiovascular:      Rate and Rhythm: Normal rate and regular rhythm.      Pulses:           Popliteal pulses are 2+ on the right side and 2+ on the left side.        Dorsalis pedis pulses are 2+ on the right side and 2+ on the left side.      Heart sounds: Normal heart sounds. No murmur heard.     No gallop. No S3 or S4 sounds.   Pulmonary:      Effort: Pulmonary effort is normal. No respiratory distress.      Breath sounds: Normal breath sounds. No decreased breath sounds, wheezing, " rhonchi or rales.   Chest:      Chest wall: No tenderness.   Abdominal:      General: Bowel sounds are normal. There is no distension.      Palpations: Abdomen is soft.      Tenderness: There is no abdominal tenderness. There is no guarding or rebound.      Hernia: No hernia is present.   Musculoskeletal:      Cervical back: Normal range of motion and neck supple.      Right lower leg: No edema.      Left lower leg: No edema.   Lymphadenopathy:      Cervical: No cervical adenopathy.   Skin:     General: Skin is warm and dry.      Findings: No rash.   Neurological:      Mental Status: She is alert.   Psychiatric:         Mood and Affect: Mood normal.         Speech: Speech normal.         Behavior: Behavior normal.         Thought Content: Thought content normal.         Judgment: Judgment normal.           Advanced Care Plan                                        PHQ  Will the patient answer the depression questions?: Yes   Little interest or pleasure in doing things: Not at all   Feeling down, depressed, or hopeless: Not at all   Depression Risk: 0                                             Mini Cog  Completed: Yes  Score: 4  Result: Negative      Get Up and Go  Result: Pass    STEADI Falls Risk  One or more falls in the last year: No           Has trouble stepping up onto a curb: No   Advised to use a cane or walker to get around safely: No   Often has to rush to the toilet: Yes   Feels unsteady when walking: No   Has lost some feeling in feet: No   Often feels sad or depressed: No   Steadies self on furniture while walking at home: No   Takes medication that makes him/her feel lightheaded or more tired than usual: No   Worried about falling: No   Takes medicine to sleep or improve mood: No   Needs to push with hands when rising from a chair: No   Falls screen completed: Yes     Immunization History   Administered Date(s) Administered    Hepatitis A 01/15/2015, 06/06/2015    INFLUENZA VACCINE QUAD ADJUVANTED 65  and OLDER 09/15/2020    Influenza Split High Dose Preservative Free IM 10/26/2015    Influenza Vaccine High Dose 65 And Older 10/01/2019, 09/23/2021, 10/06/2022    Influenza Vaccine Quadrivalent 3 Yr And Older 01/01/2009, 01/01/2010, 01/01/2011, 01/01/2012, 01/01/2013, 01/01/2014    Influenza Vaccine Quadrivalent Preservative Free 6-35 Months 10/31/2016    Influenza, Unspecified 11/20/2013, 11/01/2016, 10/01/2018, 10/01/2019, 09/15/2020, 10/06/2022, 10/18/2023    Pneumococcal Conjugate 13-Valent 10/26/2015    Pneumococcal Polysaccharide 10/12/2011, 11/26/2019    Respiratory Syncytial Virus (RSV), Vaccine, Recomb, Arexvy 10/18/2023    SARS-COV-2 (COVID-19) VACCINE PFIZER BIVALENT 12 years and older (Campos Cap) 10/24/2022, 07/12/2023    SARS-COV-2 (COVID19) VACCINE, PFIZER MONOVALENT 02/12/2021, 03/13/2021, 10/14/2021, 04/21/2022    Tdap 10/10/2010, 04/12/2021    Javid-Sucrose (Covid-19) Campos Cap, Pfizer Monovalent 04/21/2022    Zoster 12/09/2015    Zoster Vaccine Recombinant Adjuvanted (Shingrix) 04/12/2021, 08/10/2021       Health Maintenance Topics with due status: Overdue       Topic Date Due    COVID-19 Vaccine 09/06/2023    Breast Cancer Screening 10/13/2023     Health Maintenance Topics with due status: Not Due       Topic Last Completion Date    Colorectal Cancer Screening 11/08/2016    DTaP, Tdap, and Td Vaccines 04/12/2021    DEXA Scan 12/08/2021    Depression Screening 12/05/2022    Falls Risk Screening 12/05/2022    Medicare Annual Wellness Visit 10/26/2023     Health Maintenance Topics with due status: Completed       Topic Last Completion Date    Hepatitis C Screening 12/12/2018    Pneumococcal (65 years and older) 11/26/2019    Zoster Vaccine 08/10/2021    Influenza Vaccine 10/18/2023     Health Maintenance Topics with due status: Aged Out       Topic Date Due    Meningococcal ACWY Aged Out    HIB Vaccines Aged Out    Hepatitis B Vaccines Aged Out    IPV Vaccines Aged Out    HPV Vaccines  Aged Out       Lab Results   Component Value Date    WBC 5.52 12/16/2022    WBC 6.52 10/27/2021    WBC 5.97 07/14/2020    HGB 13.9 12/16/2022    HGB 14.1 10/27/2021    HGB 13.5 07/14/2020    HCT 41.9 12/16/2022    HCT 43.4 10/27/2021    HCT 41.0 07/14/2020    MCV 96.1 12/16/2022    .0 (H) 10/27/2021    MCV 97.9 07/14/2020     12/16/2022     10/27/2021     07/14/2020       Lab Results   Component Value Date    GLUCOSE 103 (H) 12/16/2022    GLUCOSE 113 (H) 10/27/2021    GLUCOSE 112 (H) 07/14/2020    CALCIUM 9.8 12/16/2022    CALCIUM 9.7 10/27/2021    CALCIUM 9.5 07/14/2020     12/16/2022     10/27/2021     07/14/2020    K 4.3 12/16/2022    K 4.4 10/27/2021    K 4.4 07/14/2020    CO2 27 12/16/2022    CO2 24 10/27/2021    CO2 27 07/14/2020     12/16/2022     10/27/2021     07/14/2020    BUN 14 12/16/2022    BUN 13 10/27/2021    BUN 14 11/04/2020    CREATININE 0.9 12/16/2022    CREATININE 0.9 10/27/2021    CREATININE 1.0 11/04/2020       Lab Results   Component Value Date    ALT 27 12/16/2022    ALT 26 10/27/2021    ALT 19 07/14/2020    AST 28 12/16/2022    AST 26 10/27/2021    AST 23 07/14/2020    GGT 10 06/03/2015    ALKPHOS 56 12/16/2022    ALKPHOS 53 10/27/2021    ALKPHOS 51 07/14/2020    BILITOT 0.9 12/16/2022    BILITOT 0.8 10/27/2021    BILITOT 0.7 07/14/2020       Lab Results   Component Value Date    CHOL 170 12/16/2022    CHOL 168 12/31/2021    CHOL 193 10/27/2021     Lab Results   Component Value Date    HDL 64 12/16/2022    HDL 61 12/31/2021    HDL 63 10/27/2021     Lab Results   Component Value Date    LDLCALC 94 12/16/2022    LDLCALC 97 12/31/2021    LDLCALC 117 (H) 10/27/2021     Lab Results   Component Value Date    TRIG 61 12/16/2022    TRIG 48 12/31/2021    TRIG 65 10/27/2021       Lab Results   Component Value Date    TSH 2.19 12/16/2022    TSH 1.74 10/27/2021    TSH 1.59 07/14/2020       Lab Results   Component Value Date    HEPCAB  Nonreactive 12/12/2018       Hearing and Vision Screening  No results found.  See HRA for relevant hearing screening response.    Assessment/Plan   Diagnoses and all orders for this visit:    Medicare annual wellness visit, subsequent (Primary)  Assessment & Plan:  · The patient is currently stable.   · Bloodwork was ordered including a complete blood count, complete metabolic profile, thyroid stimulating hormone, and a lipid profile.   · Further recommendations will be provided to the patient based on the results of the blood tests.   · The patient should exercise at 70 percent of her maximal heart rate for 2.5 hours per week.   · All the recommend vaccinations are up to date.   · The patient should be evaluated by the ophthalmologist every year and dentist every 6 months.   · The patient was advised to protect the skin by applying suntan lotion containing an SPF > 30 every 2 hours while in sun or after swimming. Instructed to avoid prolonged direct sun exposure as much as possible.   · The patient's body mass index is normal.  · The patient's  mammogram is up to date.   · The patient's DEXA scan is due at this time   · The patient's colonoscopy is up to date.   · Advised to consume 1000 mg of calcium daily through the diet. If the patient is unable to consume 1000 mg through the diet, then taking calcium supplements is an acceptable alternative. The patient was informed not to consume more than 500 mg of a calcium supplement at a time.         Malignant neoplasm of right female breast, unspecified estrogen receptor status, unspecified site of breast (CMS/HCC)    Screening for lung cancer  -     CT LUNG SCREENING WITHOUT IV CONTRAST; Future    Osteopenia of both hips  Assessment & Plan:  Check DEXA scan     Orders:  -     DEXA BONE DENSITY; Future    Mixed hyperlipidemia  -     CBC and Differential; Future  -     Comprehensive metabolic panel; Future  -     Lipid panel; Future  -     TSH 3rd Generation;  Future    Nicotine dependence, cigarettes, uncomplicated  -     CT LUNG SCREENING WITHOUT IV CONTRAST; Future      See Patient Instructions (the written plan) which was given to the patient for PPPS and health risk factors with interventions.

## 2023-11-06 ENCOUNTER — TELEPHONE (OUTPATIENT)
Dept: PULMONOLOGY | Facility: HOSPITAL | Age: 73
End: 2023-11-06
Payer: MEDICARE

## 2023-11-06 VITALS — HEIGHT: 68 IN | WEIGHT: 132 LBS | BODY MASS INDEX: 20 KG/M2

## 2023-12-01 ENCOUNTER — DOCUMENTATION (OUTPATIENT)
Dept: PRIMARY CARE | Facility: CLINIC | Age: 73
End: 2023-12-01
Payer: MEDICARE

## 2023-12-01 ENCOUNTER — APPOINTMENT (OUTPATIENT)
Dept: LAB | Facility: CLINIC | Age: 73
End: 2023-12-01
Attending: FAMILY MEDICINE
Payer: MEDICARE

## 2023-12-01 DIAGNOSIS — E78.2 MIXED HYPERLIPIDEMIA: Chronic | ICD-10-CM

## 2023-12-01 LAB
ALBUMIN SERPL-MCNC: 4.2 G/DL (ref 3.5–5.7)
ALP SERPL-CCNC: 56 IU/L (ref 34–125)
ALT SERPL-CCNC: 18 IU/L (ref 7–52)
ANION GAP SERPL CALC-SCNC: 4 MEQ/L (ref 3–15)
AST SERPL-CCNC: 22 IU/L (ref 13–39)
BASOPHILS # BLD: 0.05 K/UL (ref 0.01–0.1)
BASOPHILS NFR BLD: 0.8 %
BILIRUB SERPL-MCNC: 0.4 MG/DL (ref 0.3–1.2)
BUN SERPL-MCNC: 20 MG/DL (ref 7–25)
CALCIUM SERPL-MCNC: 9.8 MG/DL (ref 8.6–10.3)
CHLORIDE SERPL-SCNC: 105 MEQ/L (ref 98–107)
CHOLEST SERPL-MCNC: 182 MG/DL
CO2 SERPL-SCNC: 32 MEQ/L (ref 21–31)
CREAT SERPL-MCNC: 0.9 MG/DL (ref 0.6–1.2)
DIFFERENTIAL METHOD BLD: ABNORMAL
EOSINOPHIL # BLD: 0.32 K/UL (ref 0.04–0.36)
EOSINOPHIL NFR BLD: 5.4 %
ERYTHROCYTE [DISTWIDTH] IN BLOOD BY AUTOMATED COUNT: 12.8 % (ref 11.7–14.4)
GFR SERPL CREATININE-BSD FRML MDRD: >60 ML/MIN/1.73M*2
GLUCOSE SERPL-MCNC: 100 MG/DL (ref 70–99)
HCT VFR BLDCO AUTO: 42.2 % (ref 35–45)
HDLC SERPL-MCNC: 62 MG/DL
HDLC SERPL: 2.9 {RATIO}
HGB BLD-MCNC: 13.4 G/DL (ref 11.8–15.7)
IMM GRANULOCYTES # BLD AUTO: 0.01 K/UL (ref 0–0.08)
IMM GRANULOCYTES NFR BLD AUTO: 0.2 %
LDLC SERPL CALC-MCNC: 108 MG/DL
LYMPHOCYTES # BLD: 2.41 K/UL (ref 1.2–3.5)
LYMPHOCYTES NFR BLD: 40.8 %
MCH RBC QN AUTO: 31.8 PG (ref 28–33.2)
MCHC RBC AUTO-ENTMCNC: 31.8 G/DL (ref 32.2–35.5)
MCV RBC AUTO: 100.2 FL (ref 83–98)
MONOCYTES # BLD: 0.44 K/UL (ref 0.28–0.8)
MONOCYTES NFR BLD: 7.5 %
NEUTROPHILS # BLD: 2.67 K/UL (ref 1.7–7)
NEUTS SEG NFR BLD: 45.3 %
NONHDLC SERPL-MCNC: 120 MG/DL
NRBC BLD-RTO: 0 %
PDW BLD AUTO: 8.9 FL (ref 9.4–12.3)
PLATELET # BLD AUTO: 258 K/UL (ref 150–369)
POTASSIUM SERPL-SCNC: 4.4 MEQ/L (ref 3.5–5.1)
PROT SERPL-MCNC: 6.5 G/DL (ref 6–8.2)
RBC # BLD AUTO: 4.21 M/UL (ref 3.93–5.22)
SODIUM SERPL-SCNC: 141 MEQ/L (ref 136–145)
TRIGL SERPL-MCNC: 58 MG/DL
TSH SERPL DL<=0.05 MIU/L-ACNC: 2.33 MIU/L (ref 0.34–5.6)
WBC # BLD AUTO: 5.9 K/UL (ref 3.8–10.5)

## 2023-12-01 PROCEDURE — 80061 LIPID PANEL: CPT

## 2023-12-01 PROCEDURE — 80053 COMPREHEN METABOLIC PANEL: CPT

## 2023-12-01 PROCEDURE — 85025 COMPLETE CBC W/AUTO DIFF WBC: CPT

## 2023-12-01 PROCEDURE — 84443 ASSAY THYROID STIM HORMONE: CPT

## 2023-12-01 PROCEDURE — 36415 COLL VENOUS BLD VENIPUNCTURE: CPT

## 2023-12-01 NOTE — RESULT ENCOUNTER NOTE
Patient notified that all lab tests were stable. Advised to make no changes in current medical regimen.

## 2023-12-11 ENCOUNTER — DOCUMENTATION (OUTPATIENT)
Dept: PRIMARY CARE | Facility: CLINIC | Age: 73
End: 2023-12-11
Payer: MEDICARE

## 2023-12-11 ENCOUNTER — HOSPITAL ENCOUNTER (OUTPATIENT)
Dept: RADIOLOGY | Facility: CLINIC | Age: 73
Discharge: HOME | End: 2023-12-11
Attending: FAMILY MEDICINE
Payer: MEDICARE

## 2023-12-11 DIAGNOSIS — M85.852 OSTEOPENIA OF BOTH HIPS: Chronic | ICD-10-CM

## 2023-12-11 DIAGNOSIS — M85.851 OSTEOPENIA OF BOTH HIPS: Chronic | ICD-10-CM

## 2023-12-11 PROCEDURE — 77080 DXA BONE DENSITY AXIAL: CPT

## 2023-12-11 NOTE — RESULT ENCOUNTER NOTE
Patient notified of bone density scan results.  It is stable compared to 2 years ago.  Repeat in 2 years.  Continue with vitamin D, calcium and weightbearing exercise.

## 2023-12-11 NOTE — PROGRESS NOTES
Patient notified of bone density scan results.  It is stable compared to 2 years ago.  Repeat in 2 years.  Continue with vitamin D, calcium and weightbearing exercis

## 2023-12-20 ENCOUNTER — TELEPHONE (OUTPATIENT)
Dept: CARDIOLOGY | Facility: CLINIC | Age: 73
End: 2023-12-20

## 2023-12-20 ENCOUNTER — OFFICE VISIT (OUTPATIENT)
Dept: CARDIOLOGY | Facility: CLINIC | Age: 73
End: 2023-12-20
Payer: MEDICARE

## 2023-12-20 VITALS
DIASTOLIC BLOOD PRESSURE: 76 MMHG | SYSTOLIC BLOOD PRESSURE: 108 MMHG | OXYGEN SATURATION: 99 % | WEIGHT: 131 LBS | HEIGHT: 68 IN | HEART RATE: 87 BPM | BODY MASS INDEX: 19.85 KG/M2

## 2023-12-20 DIAGNOSIS — E78.2 MIXED HYPERLIPIDEMIA: ICD-10-CM

## 2023-12-20 DIAGNOSIS — I25.10 CORONARY ARTERY DISEASE, UNSPECIFIED VESSEL OR LESION TYPE, UNSPECIFIED WHETHER ANGINA PRESENT, UNSPECIFIED WHETHER NATIVE OR TRANSPLANTED HEART: Primary | ICD-10-CM

## 2023-12-20 PROCEDURE — 93000 ELECTROCARDIOGRAM COMPLETE: CPT | Performed by: INTERNAL MEDICINE

## 2023-12-20 PROCEDURE — 99214 OFFICE O/P EST MOD 30 MIN: CPT | Performed by: INTERNAL MEDICINE

## 2023-12-20 RX ORDER — EVOLOCUMAB 140 MG/ML
140 INJECTION, SOLUTION SUBCUTANEOUS
Qty: 2 ML | Refills: 6 | Status: SHIPPED | OUTPATIENT
Start: 2023-12-20 | End: 2024-01-02 | Stop reason: SDUPTHER

## 2023-12-20 NOTE — LETTER
December 20, 2023     Peter Solorzano MD  [address]  [address]  [facility] [address]    Patient: Ashleigh Peng  YOB: 1950  Date of Visit: 12/20/2023      Dear Dr. Solorzano:    Thank you for referring Ashleigh Peng to me for evaluation. Below are my notes for this consultation.    If you have questions, please do not hesitate to call me. I look forward to following your patient along with you.         Sincerely,        Kun Guzman MD        CC: No Recipients    Kun Guzman MD  12/20/2023  5:11 PM  Signed     [facility]  [place] Office       Patient ID: Ashleigh Peng 73 y.o. female 1950  PCP: Peter Solorzano MD      HPI     Ashleigh Peng is a 73 y.o. female I the pleasure seen in the office today for routine cardiovascular follow-up.    She has a background of familial hypercholesterolemia, coronary calcifications on CT scan, mild mitral valve prolapse and regurgitation, tobacco abuse and prior breast cancer.  Her grandson tested positive for LDLR gene mutation consistent with autosomal dominant familial hypercholesterolemia.    I last saw her about 6 months ago.  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  This diagnosis had been suspected in her given longstanding history of severe hyperlipidemia and family history consistent with this diagnosis.    She has been on Crestor 40 mg daily and Zetia with LDL remaining elevated.  She otherwise is feeling completely fine.  She continues to have no anginal chest pain, shortness of breath, palpitations, syncope, lower extremity edema, or claudication.  She does continue to smoke.    We discussed initiation of PCSK9 inhibitor today and she is agreeable.           Medications     Current Outpatient Medications   Medication Instructions   • aspirin 81  "mg, oral, Daily   • cholecalciferol (vitamin D3) 2,000 Units, oral, Daily   • cyanocobalamin (VITAMIN B12) 1,000 mcg, oral, Daily   • ezetimibe (ZETIA) 10 mg, oral, Daily   • multivitamin-iron-folic acid  mg-mcg tablet 1 tablet, oral, Daily   • rosuvastatin (CRESTOR) 40 mg, oral, Nightly   • ubidecarenone (COENZYME Q10 ORAL) 1 tablet, oral, Daily         Allergies     No known allergies     Vital Signs/Exam     Vitals:    12/20/23 1342   BP: 108/76   BP Location: Left upper arm   Patient Position: Sitting   Pulse: 87   SpO2: 99%   Weight: 59.4 kg (131 lb)   Height: 1.727 m (5' 8\")     BP Readings from Last 3 Encounters:   12/20/23 108/76   10/26/23 118/78   08/23/23 112/68     Wt Readings from Last 3 Encounters:   12/20/23 59.4 kg (131 lb)   11/06/23 59.9 kg (132 lb)   10/26/23 59.9 kg (132 lb)        Gen: no distress  HEENT: no scleral icterus  Neck: no JVD, no carotid bruit  Lungs: clear bilaterally; no crackles, rhonchi, wheezing  Heart/Chest: regular rate and rhythm; very soft systolic ejection murmur heard at right upper sternal border  Extremities: no edema  Neuro: nonfocal, alert and oriented  Psych: normal mood and affect     Diagnostic Data   Diagnostic data personally reviewed. Available medical records were reviewed and updated where appropriate including laboratory data, imaging studies, and previous outpatient and inpatient records.  Counseling/education performed.      Labs:  Lab Results   Component Value Date    WBC 5.90 12/01/2023    HGB 13.4 12/01/2023     12/01/2023    ALT 18 12/01/2023    AST 22 12/01/2023     12/01/2023    K 4.4 12/01/2023     12/01/2023    CREATININE 0.9 12/01/2023    BUN 20 12/01/2023    CO2 32 (H) 12/01/2023    TSH 2.33 12/01/2023    HGBA1C 5.8 (H) 12/16/2022       Lab Results   Component Value Date    CHOL 182 12/01/2023    CHOL 170 12/16/2022    LDLCALC 108 (H) 12/01/2023    LDLCALC 94 12/16/2022    HDL 62 12/01/2023    HDL 64 12/16/2022    TRIG 58 " 12/01/2023    TRIG 61 12/16/2022       Echocardiogram:  1/22/2021  LV systolic function without segmental wall motion normalities, EF 60%  Abnormal diastolic function  Calcified leaflet aortic valve with decreased motion but no hemodynamically significant stenosis  Mildly thickened anterior mitral valve leaflet with minimal mitral valve prolapse and mild MR, trace TR, estimated PASP of 26    Stress Tests:   Stress Myoview January 2019  Negative for ischemia and scar next normal LV wall motion EF in size  Stress EKG negative for ischemia  Good exercise tolerance          Cardiac cath:      Vascular Studies:  No results found for this or any previous visit from the past 365 days.    2016 carotid ultrasound normal      Peripheral:  No results found for this or any previous visit from the past 365 days.      Cardiac Monitor:     ECG independently reviewed: NSR, LAD     Assessment and Plan        Ashleigh Peng is a 73 y.o. female who presents today for office follow-up.    #1.  Hyperlipidemia  Familial hypercholesterolemia with genetics and her grandson positive for autosomal dominant FH  -She remains on Crestor 40 mg daily and Zetia.  Given diagnosis of FH and persistently elevated LDL levels I have advised initiation of PCSK9 inhibitor with Repatha.  She is agreeable.  -She does have evidence of asymptomatic ASCVD with coronary calcifications on CT scan.  -Will plan to recheck lipid profile prior to her next visit with me after starting PCSK9 inhibitor.    #2.  Valvular heart disease   The patient has mitral valve prolapse which is minimal along with mild mitral regurgitation.  She also has a calcified trileaflet aortic valve with decreased motion but no hemodynamic aortic stenosis.  She has a very mild outflow murmur from her aortic valve.     #3.  Tobacco abuse   Cessation encouraged         I will plan to see her again in about 6 months.     Available medical records were reviewed and updated where appropriate  including laboratory data, imaging studies, and previous outpatient and inpatient records.  Counseling/education performed.       Thank you for allowing me to participate in the care of this patient.    Kun Guzman MD     [facility] Office: [phone]  [facility] [place]   12/20/2023

## 2023-12-20 NOTE — PROGRESS NOTES
[facility] Cardiology  [place] Office       Patient ID: Ashleigh Peng 73 y.o. female 1950  PCP: Peter Solorzano MD      HPI     Ashleigh Peng is a 73 y.o. female I the pleasure seen in the office today for routine cardiovascular follow-up.    She has a background of familial hypercholesterolemia, coronary calcifications on CT scan, mild mitral valve prolapse and regurgitation, tobacco abuse and prior breast cancer.  Her grandson tested positive for LDLR gene mutation consistent with autosomal dominant familial hypercholesterolemia.    I last saw her about 6 months ago.  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  This diagnosis had been suspected in her given longstanding history of severe hyperlipidemia and family history consistent with this diagnosis.    She has been on Crestor 40 mg daily and Zetia with LDL remaining elevated.  She otherwise is feeling completely fine.  She continues to have no anginal chest pain, shortness of breath, palpitations, syncope, lower extremity edema, or claudication.  She does continue to smoke.    We discussed initiation of PCSK9 inhibitor today and she is agreeable.           Medications     Current Outpatient Medications   Medication Instructions   • aspirin 81 mg, oral, Daily   • cholecalciferol (vitamin D3) 2,000 Units, oral, Daily   • cyanocobalamin (VITAMIN B12) 1,000 mcg, oral, Daily   • ezetimibe (ZETIA) 10 mg, oral, Daily   • multivitamin-iron-folic acid  mg-mcg tablet 1 tablet, oral, Daily   • rosuvastatin (CRESTOR) 40 mg, oral, Nightly   • ubidecarenone (COENZYME Q10 ORAL) 1 tablet, oral, Daily         Allergies     No known allergies     Vital Signs/Exam     Vitals:    12/20/23 1342   BP: 108/76   BP Location: Left upper arm   Patient Position: Sitting   Pulse: 87   SpO2: 99%  "  Weight: 59.4 kg (131 lb)   Height: 1.727 m (5' 8\")     BP Readings from Last 3 Encounters:   12/20/23 108/76   10/26/23 118/78   08/23/23 112/68     Wt Readings from Last 3 Encounters:   12/20/23 59.4 kg (131 lb)   11/06/23 59.9 kg (132 lb)   10/26/23 59.9 kg (132 lb)        Gen: no distress  HEENT: no scleral icterus  Neck: no JVD, no carotid bruit  Lungs: clear bilaterally; no crackles, rhonchi, wheezing  Heart/Chest: regular rate and rhythm; very soft systolic ejection murmur heard at right upper sternal border  Extremities: no edema  Neuro: nonfocal, alert and oriented  Psych: normal mood and affect     Diagnostic Data   Diagnostic data personally reviewed. Available medical records were reviewed and updated where appropriate including laboratory data, imaging studies, and previous outpatient and inpatient records.  Counseling/education performed.      Labs:  Lab Results   Component Value Date    WBC 5.90 12/01/2023    HGB 13.4 12/01/2023     12/01/2023    ALT 18 12/01/2023    AST 22 12/01/2023     12/01/2023    K 4.4 12/01/2023     12/01/2023    CREATININE 0.9 12/01/2023    BUN 20 12/01/2023    CO2 32 (H) 12/01/2023    TSH 2.33 12/01/2023    HGBA1C 5.8 (H) 12/16/2022       Lab Results   Component Value Date    CHOL 182 12/01/2023    CHOL 170 12/16/2022    LDLCALC 108 (H) 12/01/2023    LDLCALC 94 12/16/2022    HDL 62 12/01/2023    HDL 64 12/16/2022    TRIG 58 12/01/2023    TRIG 61 12/16/2022       Echocardiogram:  1/22/2021  LV systolic function without segmental wall motion normalities, EF 60%  Abnormal diastolic function  Calcified leaflet aortic valve with decreased motion but no hemodynamically significant stenosis  Mildly thickened anterior mitral valve leaflet with minimal mitral valve prolapse and mild MR, trace TR, estimated PASP of 26    Stress Tests:   Stress Myoview January 2019  Negative for ischemia and scar next normal LV wall motion EF in size  Stress EKG negative for " ischemia  Good exercise tolerance          Cardiac cath:      Vascular Studies:  No results found for this or any previous visit from the past 365 days.    2016 carotid ultrasound normal      Peripheral:  No results found for this or any previous visit from the past 365 days.      Cardiac Monitor:     ECG independently reviewed: NSR, LAD     Assessment and Plan        Ashleigh Peng is a 73 y.o. female who presents today for office follow-up.    #1.  Hyperlipidemia  Familial hypercholesterolemia with genetics and her grandson positive for autosomal dominant FH  -She remains on Crestor 40 mg daily and Zetia.  Given diagnosis of FH and persistently elevated LDL levels I have advised initiation of PCSK9 inhibitor with Repatha.  She is agreeable.  -She does have evidence of asymptomatic ASCVD with coronary calcifications on CT scan.  -Will plan to recheck lipid profile prior to her next visit with me after starting PCSK9 inhibitor.    #2.  Valvular heart disease   The patient has mitral valve prolapse which is minimal along with mild mitral regurgitation.  She also has a calcified trileaflet aortic valve with decreased motion but no hemodynamic aortic stenosis.  She has a very mild outflow murmur from her aortic valve.     #3.  Tobacco abuse   Cessation encouraged         I will plan to see her again in about 6 months.     Available medical records were reviewed and updated where appropriate including laboratory data, imaging studies, and previous outpatient and inpatient records.  Counseling/education performed.       Thank you for allowing me to participate in the care of this patient.    Kun Guzman MD     [facility] Office: [phone]  [facility]: [facility]   12/20/2023

## 2024-01-02 RX ORDER — EVOLOCUMAB 140 MG/ML
140 INJECTION, SOLUTION SUBCUTANEOUS
Qty: 6 ML | Refills: 11 | Status: SHIPPED | OUTPATIENT
Start: 2024-01-02 | End: 2025-01-14

## 2024-01-02 NOTE — TELEPHONE ENCOUNTER
Medicine Refill Request  evolocumab (REPATHA SURECLICK) 140 mg/mL pen 90#    [facility] 90#.  [place] address- [address]     Last Office Visit: Visit date not found   Last Consult Visit: Visit date not found  Last Telemedicine Visit: Visit date not found    Next Appointment: Visit date not found      Current Outpatient Medications:   •  aspirin 81 mg enteric coated tablet, Take 81 mg by mouth daily. , Disp: , Rfl:   •  cholecalciferol, vitamin D3, 2,000 unit tablet, Take 2,000 Units by mouth daily., Disp: , Rfl:   •  cyanocobalamin (VITAMIN B12) 1,000 mcg tablet, Take 1,000 mcg by mouth daily., Disp: , Rfl:   •  evolocumab (REPATHA SURECLICK) 140 mg/mL pen, Inject 1 mL (140 mg total) under the skin every 14 (fourteen) days., Disp: 2 mL, Rfl: 6  •  ezetimibe (ZETIA) 10 mg tablet, TAKE 1 TABLET BY MOUTH  DAILY, Disp: 90 tablet, Rfl: 3  •  multivitamin-iron-folic acid  mg-mcg tablet, Take 1 tablet by mouth daily., Disp: , Rfl:   •  rosuvastatin (CRESTOR) 40 mg tablet, TAKE 1 TABLET BY MOUTH AT  NIGHT, Disp: 90 tablet, Rfl: 3  •  ubidecarenone (COENZYME Q10 ORAL), Take 1 tablet by mouth daily., Disp: , Rfl:       BP Readings from Last 3 Encounters:   12/20/23 108/76   10/26/23 118/78   08/23/23 112/68       Recent Lab results:  Lab Results   Component Value Date    CHOL 182 12/01/2023   ,   Lab Results   Component Value Date    HDL 62 12/01/2023   ,   Lab Results   Component Value Date    LDLCALC 108 (H) 12/01/2023   ,   Lab Results   Component Value Date    TRIG 58 12/01/2023        Lab Results   Component Value Date    GLUCOSE 100 (H) 12/01/2023   ,   Lab Results   Component Value Date    HGBA1C 5.8 (H) 12/16/2022         Lab Results   Component Value Date    CREATININE 0.9 12/01/2023       Lab Results   Component Value Date    TSH 2.33 12/01/2023           Lab Results   Component Value Date    HGBA1C 5.8 (H) 12/16/2022

## 2024-05-15 ENCOUNTER — HOSPITAL ENCOUNTER (OUTPATIENT)
Dept: RADIOLOGY | Age: 74
Discharge: HOME | End: 2024-05-15
Attending: FAMILY MEDICINE
Payer: MEDICARE

## 2024-05-15 ENCOUNTER — DOCUMENTATION (OUTPATIENT)
Dept: PRIMARY CARE | Facility: CLINIC | Age: 74
End: 2024-05-15
Payer: MEDICARE

## 2024-05-15 DIAGNOSIS — F17.210 NICOTINE DEPENDENCE, CIGARETTES, UNCOMPLICATED: ICD-10-CM

## 2024-05-15 DIAGNOSIS — Z12.2 SCREENING FOR LUNG CANCER: ICD-10-CM

## 2024-05-15 PROCEDURE — 71271 CT THORAX LUNG CANCER SCR C-: CPT

## 2024-05-15 NOTE — PROGRESS NOTES
Let patient know that her low dose CT scan lung scan was benign. Nothing further to do except recheck the study in on year.

## 2024-05-16 ENCOUNTER — APPOINTMENT (OUTPATIENT)
Dept: LAB | Facility: CLINIC | Age: 74
End: 2024-05-16
Attending: INTERNAL MEDICINE
Payer: MEDICARE

## 2024-05-16 ENCOUNTER — TELEPHONE (OUTPATIENT)
Dept: PRIMARY CARE | Facility: CLINIC | Age: 74
End: 2024-05-16
Payer: MEDICARE

## 2024-05-16 DIAGNOSIS — E78.2 MIXED HYPERLIPIDEMIA: ICD-10-CM

## 2024-05-16 LAB
CHOLEST SERPL-MCNC: 261 MG/DL
HDLC SERPL-MCNC: 73 MG/DL
HDLC SERPL: 3.6 {RATIO}
LDLC SERPL CALC-MCNC: 170 MG/DL
NONHDLC SERPL-MCNC: 188 MG/DL
TRIGL SERPL-MCNC: 88 MG/DL

## 2024-05-16 PROCEDURE — 80061 LIPID PANEL: CPT

## 2024-05-16 PROCEDURE — 36415 COLL VENOUS BLD VENIPUNCTURE: CPT

## 2024-05-16 NOTE — TELEPHONE ENCOUNTER
----- Message from Peter Solorzano MD sent at 5/15/2024  3:27 PM EDT -----  Let patient know that her low dose CT scan lung scan was benign. Nothing further to do except recheck the study in on year.  I called the patient I left a message on her cell .

## 2024-05-29 ENCOUNTER — OFFICE VISIT (OUTPATIENT)
Dept: CARDIOLOGY | Facility: CLINIC | Age: 74
End: 2024-05-29
Payer: MEDICARE

## 2024-05-29 VITALS
BODY MASS INDEX: 21.21 KG/M2 | DIASTOLIC BLOOD PRESSURE: 80 MMHG | OXYGEN SATURATION: 94 % | WEIGHT: 132 LBS | HEART RATE: 97 BPM | HEIGHT: 66 IN | SYSTOLIC BLOOD PRESSURE: 120 MMHG

## 2024-05-29 DIAGNOSIS — I25.10 CORONARY ARTERY DISEASE, UNSPECIFIED VESSEL OR LESION TYPE, UNSPECIFIED WHETHER ANGINA PRESENT, UNSPECIFIED WHETHER NATIVE OR TRANSPLANTED HEART: Primary | ICD-10-CM

## 2024-05-29 DIAGNOSIS — E78.49 FAMILIAL HYPERLIPIDEMIA: ICD-10-CM

## 2024-05-29 DIAGNOSIS — E78.2 MIXED HYPERLIPIDEMIA: ICD-10-CM

## 2024-05-29 LAB
ATRIAL RATE: 97
P AXIS: 72
PR INTERVAL: 140
QRS DURATION: 78
QT INTERVAL: 354
QTC CALCULATION(BAZETT): 449
R AXIS: -55
T WAVE AXIS: 78
VENTRICULAR RATE: 97

## 2024-05-29 PROCEDURE — 99214 OFFICE O/P EST MOD 30 MIN: CPT | Performed by: INTERNAL MEDICINE

## 2024-05-29 PROCEDURE — 93000 ELECTROCARDIOGRAM COMPLETE: CPT | Performed by: INTERNAL MEDICINE

## 2024-05-29 NOTE — PROGRESS NOTES
"   [facility] Cardiology  [place] Office       Patient ID: Ashleigh Peng 73 y.o. female 1950  PCP: Peter Solorzano MD      HPI     Ashleigh Peng is a 73 y.o. female I the pleasure seen in the office today for routine cardiovascular follow-up.    She has a background of familial hypercholesterolemia, coronary calcifications on CT scan, mild mitral valve prolapse and regurgitation, tobacco abuse and prior breast cancer.  Her grandson tested positive for LDLR gene mutation consistent with autosomal dominant familial hypercholesterolemia.    She just had follow-up blood work done for the first time after starting her PCSK9 inhibitor but she did not realize she was supposed to be continuing on her Crestor and Zetia so she has been off of this and in that context her lipid profile actually worsened while on Repatha.  She continues to tolerate the medication well and she just restarted her statin and Zetia.    She continues to have no anginal chest pain, shortness of breath, palpitations, syncope, lower extremity edema, or claudication.  She does continue to smoke but is interesting in quitting and asks about Chantix.         Medications     Current Outpatient Medications   Medication Instructions    aspirin 81 mg, oral, Daily    cholecalciferol (vitamin D3) 2,000 Units, oral, Daily    cyanocobalamin (VITAMIN B12) 1,000 mcg, oral, Daily    ezetimibe (ZETIA) 10 mg, oral, Daily    multivitamin-iron-folic acid  mg-mcg tablet 1 tablet, oral, Daily    REPATHA SURECLICK 140 mg, subcutaneous, Every 14 days    rosuvastatin (CRESTOR) 40 mg, oral, Nightly    ubidecarenone (COENZYME Q10 ORAL) 1 tablet, oral, Daily         Allergies     No known allergies     Vital Signs/Exam     Vitals:    05/29/24 1427   BP: 120/80   BP Location: Left upper arm   Patient Position: Sitting   Pulse: 97   SpO2: 94%   Weight: 59.9 kg (132 lb)   Height: 1.676 m (5' 6\")     BP Readings from Last 3 Encounters:   05/29/24 120/80   12/20/23 " 108/76   10/26/23 118/78     Wt Readings from Last 3 Encounters:   05/29/24 59.9 kg (132 lb)   12/20/23 59.4 kg (131 lb)   11/06/23 59.9 kg (132 lb)        Gen: no distress  HEENT: no scleral icterus  Neck: no JVD, no carotid bruit  Lungs: clear bilaterally; no crackles, rhonchi, wheezing  Heart/Chest: regular rate and rhythm; very soft systolic ejection murmur heard at right upper sternal border  Extremities: no edema  Neuro: nonfocal, alert and oriented  Psych: normal mood and affect     Diagnostic Data   Diagnostic data personally reviewed. Available medical records were reviewed and updated where appropriate including laboratory data, imaging studies, and previous outpatient and inpatient records.  Counseling/education performed.      Labs:  Lab Results   Component Value Date    WBC 5.90 12/01/2023    HGB 13.4 12/01/2023     12/01/2023    ALT 18 12/01/2023    AST 22 12/01/2023     12/01/2023    K 4.4 12/01/2023     12/01/2023    CREATININE 0.9 12/01/2023    BUN 20 12/01/2023    CO2 32 (H) 12/01/2023    TSH 2.33 12/01/2023    HGBA1C 5.8 (H) 12/16/2022       Lab Results   Component Value Date    CHOL 261 (H) 05/16/2024    CHOL 182 12/01/2023    LDLCALC 170 (H) 05/16/2024    LDLCALC 108 (H) 12/01/2023    HDL 73 05/16/2024    HDL 62 12/01/2023    TRIG 88 05/16/2024    TRIG 58 12/01/2023       Echocardiogram:  1/22/2021  LV systolic function without segmental wall motion normalities, EF 60%  Abnormal diastolic function  Calcified leaflet aortic valve with decreased motion but no hemodynamically significant stenosis  Mildly thickened anterior mitral valve leaflet with minimal mitral valve prolapse and mild MR, trace TR, estimated PASP of 26    Stress Tests:   Stress Myoview January 2019  Negative for ischemia and scar next normal LV wall motion EF in size  Stress EKG negative for ischemia  Good exercise tolerance          Cardiac cath:      Vascular Studies:  No results found for this or any  previous visit from the past 365 days.    2016 carotid ultrasound normal      Peripheral:  No results found for this or any previous visit from the past 365 days.      Cardiac Monitor:     ECG independently reviewed: NSR, LAD     Assessment and Plan        Ashleigh Peng is a 73 y.o. female who presents today for office follow-up.    #1.  Hyperlipidemia  Familial hypercholesterolemia with genetics and her grandson positive for autosomal dominant FH  -She has started on Repatha but did not realize she was post continue on Crestor and Zetia.  She is going to be restarting these medications and have follow-up lipid panel again after 3 months.  -She does have evidence of asymptomatic ASCVD with coronary calcifications on CT scan.    #2.  Valvular heart disease   The patient has mitral valve prolapse which is minimal along with mild mitral regurgitation.  She also has a calcified trileaflet aortic valve with decreased motion but no hemodynamic aortic stenosis.  She has a very mild outflow murmur from her aortic valve.     #3.  Tobacco abuse   Cessation encouraged         I will plan to see her again in about 6 months with lipid panel prior to that.     Available medical records were reviewed and updated where appropriate including laboratory data, imaging studies, and previous outpatient and inpatient records.  Counseling/education performed.       Thank you for allowing me to participate in the care of this patient.    Kun Guzman MD     [facility] Office: [phone]  [facility]: [facility]   5/29/2024

## 2024-05-29 NOTE — LETTER
May 29, 2024     Peter Solorzano MD  [address]  [address]  [facility] [address]    Patient: Ashleigh Peng  YOB: 1950  Date of Visit: 5/29/2024      Dear Dr. Solorzano:    Thank you for referring Ashleigh Peng to me for evaluation. Below are my notes for this consultation.    If you have questions, please do not hesitate to call me. I look forward to following your patient along with you.         Sincerely,        Kun Guzman MD        CC: No Recipients    Kun Guzman MD  5/29/2024  4:04 PM  Sign when Signing Visit     [facility] Cardiology  [place] Office       Patient ID: Ashleigh Peng 73 y.o. female 1950  PCP: Peter Solorzano MD      HPI     Ashleigh Peng is a 73 y.o. female I the pleasure seen in the office today for routine cardiovascular follow-up.    She has a background of familial hypercholesterolemia, coronary calcifications on CT scan, mild mitral valve prolapse and regurgitation, tobacco abuse and prior breast cancer.  Her grandson tested positive for LDLR gene mutation consistent with autosomal dominant familial hypercholesterolemia.    She just had follow-up blood work done for the first time after starting her PCSK9 inhibitor but she did not realize she was supposed to be continuing on her Crestor and Zetia so she has been off of this and in that context her lipid profile actually worsened while on Repatha.  She continues to tolerate the medication well and she just restarted her statin and Zetia.    She continues to have no anginal chest pain, shortness of breath, palpitations, syncope, lower extremity edema, or claudication.  She does continue to smoke but is interesting in quitting and asks about Chantix.         Medications     Current Outpatient Medications   Medication Instructions   • aspirin 81 mg, oral, Daily   • cholecalciferol (vitamin D3) 2,000 Units, oral, Daily   • cyanocobalamin (VITAMIN B12) 1,000 mcg, oral, Daily   • ezetimibe (ZETIA) 10 mg, oral,  "Daily   • multivitamin-iron-folic acid  mg-mcg tablet 1 tablet, oral, Daily   • REPATHA SURECLICK 140 mg, subcutaneous, Every 14 days   • rosuvastatin (CRESTOR) 40 mg, oral, Nightly   • ubidecarenone (COENZYME Q10 ORAL) 1 tablet, oral, Daily         Allergies     No known allergies     Vital Signs/Exam     Vitals:    05/29/24 1427   BP: 120/80   BP Location: Left upper arm   Patient Position: Sitting   Pulse: 97   SpO2: 94%   Weight: 59.9 kg (132 lb)   Height: 1.676 m (5' 6\")     BP Readings from Last 3 Encounters:   05/29/24 120/80   12/20/23 108/76   10/26/23 118/78     Wt Readings from Last 3 Encounters:   05/29/24 59.9 kg (132 lb)   12/20/23 59.4 kg (131 lb)   11/06/23 59.9 kg (132 lb)        Gen: no distress  HEENT: no scleral icterus  Neck: no JVD, no carotid bruit  Lungs: clear bilaterally; no crackles, rhonchi, wheezing  Heart/Chest: regular rate and rhythm; very soft systolic ejection murmur heard at right upper sternal border  Extremities: no edema  Neuro: nonfocal, alert and oriented  Psych: normal mood and affect     Diagnostic Data   Diagnostic data personally reviewed. Available medical records were reviewed and updated where appropriate including laboratory data, imaging studies, and previous outpatient and inpatient records.  Counseling/education performed.      Labs:  Lab Results   Component Value Date    WBC 5.90 12/01/2023    HGB 13.4 12/01/2023     12/01/2023    ALT 18 12/01/2023    AST 22 12/01/2023     12/01/2023    K 4.4 12/01/2023     12/01/2023    CREATININE 0.9 12/01/2023    BUN 20 12/01/2023    CO2 32 (H) 12/01/2023    TSH 2.33 12/01/2023    HGBA1C 5.8 (H) 12/16/2022       Lab Results   Component Value Date    CHOL 261 (H) 05/16/2024    CHOL 182 12/01/2023    LDLCALC 170 (H) 05/16/2024    LDLCALC 108 (H) 12/01/2023    HDL 73 05/16/2024    HDL 62 12/01/2023    TRIG 88 05/16/2024    TRIG 58 12/01/2023       Echocardiogram:  1/22/2021  LV systolic function without " segmental wall motion normalities, EF 60%  Abnormal diastolic function  Calcified leaflet aortic valve with decreased motion but no hemodynamically significant stenosis  Mildly thickened anterior mitral valve leaflet with minimal mitral valve prolapse and mild MR, trace TR, estimated PASP of 26    Stress Tests:   Stress Myoview January 2019  Negative for ischemia and scar next normal LV wall motion EF in size  Stress EKG negative for ischemia  Good exercise tolerance          Cardiac cath:      Vascular Studies:  No results found for this or any previous visit from the past 365 days.    2016 carotid ultrasound normal      Peripheral:  No results found for this or any previous visit from the past 365 days.      Cardiac Monitor:     ECG independently reviewed: NSR, LAD     Assessment and Plan        Ashleigh Peng is a 73 y.o. female who presents today for office follow-up.    #1.  Hyperlipidemia  Familial hypercholesterolemia with genetics and her grandson positive for autosomal dominant FH  -She has started on Repatha but did not realize she was post continue on Crestor and Zetia.  She is going to be restarting these medications and have follow-up lipid panel again after 3 months.  -She does have evidence of asymptomatic ASCVD with coronary calcifications on CT scan.    #2.  Valvular heart disease   The patient has mitral valve prolapse which is minimal along with mild mitral regurgitation.  She also has a calcified trileaflet aortic valve with decreased motion but no hemodynamic aortic stenosis.  She has a very mild outflow murmur from her aortic valve.     #3.  Tobacco abuse   Cessation encouraged         I will plan to see her again in about 6 months with lipid panel prior to that.     Available medical records were reviewed and updated where appropriate including laboratory data, imaging studies, and previous outpatient and inpatient records.  Counseling/education performed.       Thank you for allowing me to  participate in the care of this patient.    Kun Guzman MD     [facility] Office: [phone]  [facility]: [facility]   5/29/2024

## 2024-09-12 ENCOUNTER — APPOINTMENT (OUTPATIENT)
Dept: LAB | Facility: CLINIC | Age: 74
End: 2024-09-12
Attending: INTERNAL MEDICINE
Payer: MEDICARE

## 2024-09-12 DIAGNOSIS — E78.2 MIXED HYPERLIPIDEMIA: ICD-10-CM

## 2024-09-12 LAB
CHOLEST SERPL-MCNC: 97 MG/DL
HDLC SERPL-MCNC: 54 MG/DL
HDLC SERPL: 1.8 {RATIO}
LDLC SERPL CALC-MCNC: 33 MG/DL
NONHDLC SERPL-MCNC: 43 MG/DL
TRIGL SERPL-MCNC: 52 MG/DL

## 2024-09-12 PROCEDURE — 80061 LIPID PANEL: CPT

## 2024-09-12 PROCEDURE — 36415 COLL VENOUS BLD VENIPUNCTURE: CPT

## 2024-10-03 RX ORDER — EZETIMIBE 10 MG/1
10 TABLET ORAL DAILY
Qty: 90 TABLET | Refills: 3 | Status: SHIPPED | OUTPATIENT
Start: 2024-10-03

## 2024-10-03 RX ORDER — ROSUVASTATIN CALCIUM 40 MG/1
40 TABLET, COATED ORAL NIGHTLY
Qty: 90 TABLET | Refills: 3 | Status: SHIPPED | OUTPATIENT
Start: 2024-10-03

## 2024-10-03 NOTE — TELEPHONE ENCOUNTER
Medicine Refill Request [facility]    Name of Patient: Ashleigh Peng    Caller's name/Relationship: Ashleigh Peng      Callback number: [phone]      Medication Name, Dosage, Supply:   rosuvastatin (CRESTOR) 40 mg tablet   Zetia 10mg    Quantity left: a week    Pharmacy: [facility]    Last Office Visit:    Last Consult Visit: Visit date not found  Last Telemedicine Visit: Visit date not found    Next Appointment: Visit date not found      Current Outpatient Medications:     aspirin 81 mg enteric coated tablet, Take 81 mg by mouth daily. , Disp: , Rfl:     cholecalciferol, vitamin D3, 2,000 unit tablet, Take 2,000 Units by mouth daily., Disp: , Rfl:     cyanocobalamin (VITAMIN B12) 1,000 mcg tablet, Take 1,000 mcg by mouth daily., Disp: , Rfl:     evolocumab (REPATHA SURECLICK) 140 mg/mL pen, Inject 1 mL (140 mg total) under the skin every 14 (fourteen) days., Disp: 6 mL, Rfl: 11    ezetimibe (ZETIA) 10 mg tablet, TAKE 1 TABLET BY MOUTH  DAILY, Disp: 90 tablet, Rfl: 3    multivitamin-iron-folic acid  mg-mcg tablet, Take 1 tablet by mouth daily., Disp: , Rfl:     rosuvastatin (CRESTOR) 40 mg tablet, TAKE 1 TABLET BY MOUTH AT  NIGHT, Disp: 90 tablet, Rfl: 3    ubidecarenone (COENZYME Q10 ORAL), Take 1 tablet by mouth daily., Disp: , Rfl:       BP Readings from Last 3 Encounters:   05/29/24 120/80   12/20/23 108/76   10/26/23 118/78       Recent Lab results:  Lab Results   Component Value Date    CHOL 97 09/12/2024   ,   Lab Results   Component Value Date    HDL 54 09/12/2024   ,   Lab Results   Component Value Date    LDLCALC 33 09/12/2024   ,   Lab Results   Component Value Date    TRIG 52 09/12/2024        Lab Results   Component Value Date    GLUCOSE 100 (H) 12/01/2023   ,   Lab Results   Component Value Date    HGBA1C 5.8 (H) 12/16/2022         Lab Results   Component Value Date    CREATININE 0.9 12/01/2023       Lab Results   Component Value Date    TSH 2.33 12/01/2023

## 2024-10-28 ENCOUNTER — OFFICE VISIT (OUTPATIENT)
Dept: PRIMARY CARE | Facility: CLINIC | Age: 74
End: 2024-10-28
Payer: MEDICARE

## 2024-10-28 VITALS
RESPIRATION RATE: 16 BRPM | DIASTOLIC BLOOD PRESSURE: 66 MMHG | OXYGEN SATURATION: 98 % | HEART RATE: 89 BPM | TEMPERATURE: 98.5 F | HEIGHT: 67 IN | WEIGHT: 135.2 LBS | BODY MASS INDEX: 21.22 KG/M2 | SYSTOLIC BLOOD PRESSURE: 98 MMHG

## 2024-10-28 DIAGNOSIS — R73.01 IFG (IMPAIRED FASTING GLUCOSE): ICD-10-CM

## 2024-10-28 DIAGNOSIS — Z00.00 MEDICARE ANNUAL WELLNESS VISIT, SUBSEQUENT: Primary | ICD-10-CM

## 2024-10-28 DIAGNOSIS — C50.911 MALIGNANT NEOPLASM OF RIGHT FEMALE BREAST, UNSPECIFIED ESTROGEN RECEPTOR STATUS, UNSPECIFIED SITE OF BREAST (CMS/HCC): ICD-10-CM

## 2024-10-28 DIAGNOSIS — E78.2 MIXED HYPERLIPIDEMIA: Chronic | ICD-10-CM

## 2024-10-28 PROBLEM — R73.9 HYPERGLYCEMIA: Status: RESOLVED | Noted: 2018-12-12 | Resolved: 2024-10-28

## 2024-10-28 PROBLEM — R01.1 MURMUR: Status: RESOLVED | Noted: 2021-07-21 | Resolved: 2024-10-28

## 2024-10-28 PROBLEM — H01.004 BLEPHARITIS OF LEFT UPPER EYELID: Status: RESOLVED | Noted: 2023-08-23 | Resolved: 2024-10-28

## 2024-10-28 PROCEDURE — G0439 PPPS, SUBSEQ VISIT: HCPCS | Performed by: FAMILY MEDICINE

## 2024-10-28 RX ORDER — VARENICLINE TARTRATE 0.5 (11)-1
KIT ORAL
Qty: 42 TABLET | Refills: 0 | Status: SHIPPED | OUTPATIENT
Start: 2024-10-28 | End: 2024-12-03

## 2024-10-28 SDOH — ECONOMIC STABILITY: FOOD INSECURITY: WITHIN THE PAST 12 MONTHS, YOU WORRIED THAT YOUR FOOD WOULD RUN OUT BEFORE YOU GOT MONEY TO BUY MORE.: NEVER TRUE

## 2024-10-28 SDOH — ECONOMIC STABILITY: FOOD INSECURITY: WITHIN THE PAST 12 MONTHS, THE FOOD YOU BOUGHT JUST DIDN'T LAST AND YOU DIDN'T HAVE MONEY TO GET MORE.: NEVER TRUE

## 2024-10-28 SDOH — HEALTH STABILITY: PHYSICAL HEALTH: ON AVERAGE, HOW MANY MINUTES DO YOU ENGAGE IN EXERCISE AT THIS LEVEL?: 30 MIN

## 2024-10-28 SDOH — HEALTH STABILITY: PHYSICAL HEALTH: ON AVERAGE, HOW MANY DAYS PER WEEK DO YOU ENGAGE IN MODERATE TO STRENUOUS EXERCISE (LIKE A BRISK WALK)?: 7 DAYS

## 2024-10-28 ASSESSMENT — ENCOUNTER SYMPTOMS
CHILLS: 0
CONFUSION: 0
EYE ITCHING: 0
PALPITATIONS: 0
BLOOD IN STOOL: 0
ABDOMINAL PAIN: 0
TROUBLE SWALLOWING: 0
SORE THROAT: 0
NECK PAIN: 0
EYE DISCHARGE: 0
UNEXPECTED WEIGHT CHANGE: 0
CHEST TIGHTNESS: 0
NERVOUS/ANXIOUS: 0
ARTHRALGIAS: 0
LIGHT-HEADEDNESS: 0
SLEEP DISTURBANCE: 0
EYE REDNESS: 0
DIZZINESS: 0
NAUSEA: 0
DYSURIA: 0
SHORTNESS OF BREATH: 0
SPEECH DIFFICULTY: 0
SINUS PRESSURE: 0
DIFFICULTY URINATING: 0
WHEEZING: 0
RHINORRHEA: 0
BACK PAIN: 0
FEVER: 0
DYSPHORIC MOOD: 0
BRUISES/BLEEDS EASILY: 0
HEADACHES: 0
FATIGUE: 0
VOMITING: 0
MYALGIAS: 0
JOINT SWELLING: 0
HEMATURIA: 0
APPETITE CHANGE: 0
FREQUENCY: 0
CONSTIPATION: 0
DIARRHEA: 0
COUGH: 0
WEAKNESS: 0
EYE PAIN: 0
NUMBNESS: 0

## 2024-10-28 ASSESSMENT — SOCIAL DETERMINANTS OF HEALTH (SDOH): HOW HARD IS IT FOR YOU TO PAY FOR THE VERY BASICS LIKE FOOD, HOUSING, MEDICAL CARE, AND HEATING?: NOT HARD AT ALL

## 2024-10-28 ASSESSMENT — PATIENT HEALTH QUESTIONNAIRE - PHQ9: SUM OF ALL RESPONSES TO PHQ9 QUESTIONS 1 & 2: 0

## 2024-10-28 ASSESSMENT — MINI COG
TOTAL SCORE: 5
COMPLETED: YES

## 2024-10-28 NOTE — PATIENT INSTRUCTIONS
Women's Personalized Prevention Plan Services (PPPS)       Preventive Services Checklist (Assumes Average Risk Unless Otherwise Noted)  Preventive Service Target Population and Frequency Last Done Date Due   Abd Aortic Aneurysm Screening Family history of AAA (covered once)   Not needed    Alcohol Misuse Screening As necessary for those at risk (covered annually) 10/28/2024 10/2025   Breast Cancer Screening Mammogram every 1-2yrs 50-69yo; every 1-2yrs 35-50yo if patient desires after weighing potential harms and benefits (covered once 35-40yo, annually >=41yo) 10/16/2024 10/2025   Cholesterol Screening Both risk factors: 1) >=21yo and 2)  increased risk coronary artery disease (covered every 5 years) 09/12/2024 09/2025   Colorectal Cancer Screening >=49yo: Colonoscopy every 10 years, FIT annually or Cologuard every 3 years (up to 84yo for Cologuard) 11/08/2016 11/2026   Diabetes Screening Any 1 risk factor: hypertension, dyslipidemia, obesity, high glucose; or Any 2 risk factors: >=64 yo, overweight, family history diabetes, history gestational diabetes or delivery of infant >9lbs (covered every 6 months) 12/01/2023 Ordered    Glaucoma Screening Any 1 risk factor: 1) diabetes, 2) family history glaucoma, 3)  >=49yo, 4)  American >=66yo (covered annually) 06/2024 06/2025   Hepatitis C Screening Any 1 risk factor: 1) born between 6[birth year]-1992, 2) blood transfusion before 1992, 3) current or past injection drug use (covered once for average risk, annually for high risk) 12/12/2018 Done    Lung Cancer Screening Low-dose chest CT if all 3 risk factors: 1) 55-76yo, 2) smoker or quit within last 15y, 3) >=30 pack years (covered annually) 05/2024 05/2025   Osteoporosis Screening >=66yo (covered every 24 months)  <66yo if any 1 risk factor: 1) estrogen deficient and at risk for osteoporosis; 2) established osteoporosis on treatment; 3) x-rays show possible osteoporosis, osteopenia or vertebral fractures;  "4) on steroid or planning to start; 5) primary hyperparathyroidism (covered as medically necessary) 12/08/2021 Ordered    Sexually Transmitted Diseases (STDs) As necessary chlamydia, gonorrhea, syphilis, hepatitis B (covered annually if not pregnant, more often in pregnancy)  HIV if any 1 risk factor present: 1) <14yo or >64yo and at increased risk, 2) 15-64yo and ask for it, or 3) pregnant (covered annually if not pregnant, up to 3x in pregnancy)   Low Risk    Vaccine: Hepatitis B As necessary if medium or high risk (series covered once)   Not needed     Vaccine: Influenza All patients without contraindications (covered annually.) 10/2024 Fall 2025   Vaccine: Pneumococcal Pneumovax + Prevnar (both covered, >=11 months apart) Prevnar   10/26/2015  Pneumovax  11/26/2019   Done    Vaccine: Shingrix (Shingles) >= 49yo without contraindications             (2 doses, 2 months apart) Zostavax  12/09/2015  Shingrix   04/12/2021  08/10/2021 Done    TDAP Every 10 years   04/12/2021 04/2031                                    Women's Personalized Prevention Plan Services (PPPS)                                                          Prints to AVS                                          Health Risk Factors and Interventions  \"x\" Indicates risk is associated with this patient Risk Factor Recommended Interventions    N/A   Overweight     N/A  Hypertension     N/A  Diabetes     N/A  Fall Risk     N/A  Tobacco Use                Problem List Items Addressed This Visit       Hyperlipidemia (Chronic)    Relevant Orders    CBC and Differential    Comprehensive metabolic panel    Hemoglobin A1c    TSH 3rd Generation    Malignant neoplasm of female breast (CMS/HCC) (Chronic)    Medicare annual wellness visit, subsequent - Primary     The patient is currently stable.   Bloodwork was ordered.   Further recommendations will be provided to the patient based on the results of the blood tests.   The patient should continue to exercise for " 2.5 hours per week.   All the recommend vaccinations are up to date.   The patient should be evaluated by the ophthalmologist every year and dentist every 6 months.   The patient was advised to protect the skin by applying suntan lotion containing an SPF > 30 every 2 hours while in sun or after swimming. Instructed to avoid prolonged direct sun exposure as much as possible.   The patient's body mass index is 21.   The patient's mammogram are up to date.   The patient's DEXA scan is up to date.   The patient's colonoscopy is up to date.   Advised to consume 1000 mg of calcium daily through the diet. If the patient is unable to consume 1000 mg through the diet, then taking calcium supplements is an acceptable alternative. The patient was informed not to consume more than 500 mg of a calcium supplement at a time.     Foods that are rich in calcium  Many foods can help increase calcium in your diet, even if you have lactose intolerance or eat a vegan diet. Try eating a variety of these foods to meet your daily calcium requirements:  Dairy products  Milk and dairy products are among the best calcium sources. Just a few servings per day can give you all the calcium you need. When choosing dairy products, don’t forget to look at the calories, fat and sodium, which can be high in some dairy foods.  Food Serving Size Calcium   Vanilla yogurt (low-fat) 8 ounces 388 mg   Milk, 1% 1 cup 310 mg   Ricotta cheese, whole milk 1/2 cup 289 mg   Greek yogurt, plain (low-fat) 8 ounces 261 mg   Cottage cheese, 2% fat 1 cup 227 mg   Cheddar cheese 1 ounce 200 mg   Vegetables and fruit juices  Leafy greens and broccoli are excellent sources of calcium. Generally, fruits aren’t calcium-rich, but some juices are fortified. This means the  has added calcium to the product. Fortified orange juice has more calcium than a glass of milk.  Food Serving Size Calcium   Orange juice, calcium-fortified 1 cup 349 mg   Nadia greens,  cooked 1 cup 268 mg   Spinach, cooked 1 cup 245 mg   Bok mihaela, cooked 1 cup 158 mg   Kale, cooked 1 cup 177 mg   Broccoli, cooked 1 cup 62 mg   Protein-rich foods  Some proteins, such as tofu with added calcium sulfate, and canned fish with bones, are high in calcium. You may need to read the labels closely to decipher which foods can deliver your daily calcium dose.  Food Serving Size Calcium   Tofu, prepared with calcium sulfate 1/2 cup 434 mg   Sardines, canned, with bones 3 ounces 324 mg   Soy milk, fortified 1 cup 299 mg   Black beans, canned 1 cup 239 mg   [place], canned, with bones 3 ounces 181 mg   Grains  Grains aren’t usually rich in calcium but are an important part of a balanced diet. Some breads and cereals are fortified and can be a great option for boosting your calcium intake. Check packaging and labels to find fortified options that you might enjoy.  Nuts and seeds  Some nuts and seeds contain a robust amount of calcium. Try incorporating nish or sesame seeds into a smoothie, salad or your morning cereal.  Food Serving Size Calcium   [place] milk, unsweetened 1 cup 482 mg   Almonds 1/4 cup 92 mg   Sesame seeds 1 tablespoon 88 mg   Nish seeds 1 tablespoon 78 mg   Tahini (sesame butter or paste) 1 tablespoon 64 mg             Other Visit Diagnoses       IFG (impaired fasting glucose)        Relevant Orders    Hemoglobin A1c             Health Risk Factors with Personalized Education:  ----------------------------------------------------------------------------------------------------------------------  Controlling Your Cholesterol  Reduce the amount of saturated and trans fat in your diet.  Limit intake of red meat.  Consume only low-fat or non-fat/skim dairy.  Limit fried food.  Cook with vegetable oils.  Reduce your intake of sugary foods, sugary drinks and alcohol.  Eat a diet high in fruit, vegetables and whole grains.  Get protein from fish, poultry and a small portion of nuts.  Stay active.  Try  to get at least 90 to 150 minutes of exercise per week.  Try brisk walking, swimming, bicycling or dancing.  Maintain a healthy weight by balancing your diet and exercise.  If you have been prescribed medication, take it regularly and exactly as prescribed. Let your PCP know if you have any problems or questions about your medication.  It’s important to know your cholesterol numbers.  When recommended by your PCP, get the cholesterol blood test.  ---------------------------------------------------------------------------------------------------------------------   Controlling Your Osteoporosis, Strengthening Your Bones  Try to get at least 90 to 150 minutes of weight-bearing exercise per week.  Ensure intake of at least 1200mg of calcium per day.  Eat foods high in calcium like milk and other dairy, green vegetables, fruit, canned fish with soft and edible bones, nuts, calcium-set tofu.  Some foods are calcium-fortified, like bread, cereal, fruit juices and mineral water.  Help your body make vitamin D by getting 10-15 minutes per day of sunlight.    Ensure intake of at least 600IU of vitamin D per day.  Eat foods high in vitamin D like oily fish (salmon, sardines, mackerel) and eggs.  Some foods are fortified with vitamin D, like dairy and cereals.  Avoid high amounts of caffeine and salt, since they can cause the body to loose calcium.  Limit alcohol intake, since it is associated with weaker bones and is associated with falls and fractures.  Limit intake of fizzy drinks.  If you have been prescribed medication, take it regularly and exactly as prescribed.  Let your PCP know if you have any problems or questions about your medication  ----------------------------------------------------------------------------------------------------------------------  Controlling Your Osteopenia, Strengthening Your Bones  Try to get at least 90 to 150 minutes of weight-bearing exercise per week.  Ensure intake of at least 1200mg  of calcium per day.  Eat foods high in calcium like milk and other dairy, green vegetables, fruit, canned fish with soft and edible bones, nuts, calcium-set tofu.  Some foods are calcium-fortified, like bread, cereal, fruit juices and mineral water.  Help your body make vitamin D by getting 10-15 minutes per day of sunlight.    Ensure intake of at least 600IU of vitamin D per day.  Eat foods high in vitamin D like oily fish (salmon, sardines, mackerel) and eggs.  Some foods are fortified with vitamin D, like dairy and cereals.  Avoid high amounts of caffeine and salt, since they can cause the body to loose calcium.  Limit alcohol intake, since it is associated with weaker bones and is associated with falls and fractures.  Limit intake of fizzy drinks.  If you have been prescribed medication, take it regularly and exactly as prescribed.  Let your PCP know if you have any problems or questions about your medication  ----------------------------------------------------------------------------------------------------------------------  Reducing Your Risk of Falls  Tell your PCP if any of your medications make you feel tired, dizzy, lightheaded or off-balance.  Maintain coordination, flexibility and balance by ensuring regular physical activity.  Limit alcohol intake to 1 drink per day.  Consider avoiding all alcohol intake.  Ensure good vision.  Visit an ophthalmologist or optometrist regularly for vision screening or to make sure your glasses / contact lens prescription is correct.  If you need glasses or contacts, wear them.  When you get new glasses or contacts, take time to get used to them.  Do not wear sunglasses or tinted lenses when indoors.  Ensure good hearing.  Have your hearing checked if you are having trouble hearing, or family and friends think you cannot hear them.  If you need a hearing aid, be sure it fits well and wear it.  Get enough rest.  Ensure about 7-9 hours of sleep every day.  Get up slowly  from your bed or chairs.  Do not start walking until you are sure you feel steady.  Wear non-skid, rubber-soled, low-heeled shoes.  Do not walk in socks, or in shoes and slippers with smooth soles.  If your PCP or therapist recommends using a cane or walker, use it regularly.  Make your home safer.  Increase lighting throughout the house, especially at the top and bottom of stairs.  Ensure lighting is easily turned on when getting up in the middle of the night.  Make sure there are two secure rails on all stairs.  Install grab bars in the bathtub / shower and near the toilet.  Consider using a shower chair and / or a hand-held shower.  Spread sand or salt on icy surfaces.  Beware of wet surfaces, which can be icy.  Tell your PCP if you have fallen.

## 2024-10-28 NOTE — ASSESSMENT & PLAN NOTE
The patient is currently stable.   Bloodwork was ordered.   Further recommendations will be provided to the patient based on the results of the blood tests.   The patient should continue to exercise for 2.5 hours per week.   All the recommend vaccinations are up to date.   The patient should be evaluated by the ophthalmologist every year and dentist every 6 months.   The patient was advised to protect the skin by applying suntan lotion containing an SPF > 30 every 2 hours while in sun or after swimming. Instructed to avoid prolonged direct sun exposure as much as possible.   The patient's body mass index is 21.   The patient's mammogram are up to date.   The patient's DEXA scan is up to date.   The patient's colonoscopy is up to date.   Advised to consume 1000 mg of calcium daily through the diet. If the patient is unable to consume 1000 mg through the diet, then taking calcium supplements is an acceptable alternative. The patient was informed not to consume more than 500 mg of a calcium supplement at a time.     Foods that are rich in calcium  Many foods can help increase calcium in your diet, even if you have lactose intolerance or eat a vegan diet. Try eating a variety of these foods to meet your daily calcium requirements:  Dairy products  Milk and dairy products are among the best calcium sources. Just a few servings per day can give you all the calcium you need. When choosing dairy products, don’t forget to look at the calories, fat and sodium, which can be high in some dairy foods.  Food Serving Size Calcium   Vanilla yogurt (low-fat) 8 ounces 388 mg   Milk, 1% 1 cup 310 mg   Ricotta cheese, whole milk 1/2 cup 289 mg   Greek yogurt, plain (low-fat) 8 ounces 261 mg   Cottage cheese, 2% fat 1 cup 227 mg   Cheddar cheese 1 ounce 200 mg   Vegetables and fruit juices  Leafy greens and broccoli are excellent sources of calcium. Generally, fruits aren’t calcium-rich, but some juices are fortified. This means the   has added calcium to the product. Fortified orange juice has more calcium than a glass of milk.  Food Serving Size Calcium   Orange juice, calcium-fortified 1 cup 349 mg   Nadia greens, cooked 1 cup 268 mg   Spinach, cooked 1 cup 245 mg   Bok mihaela, cooked 1 cup 158 mg   Kale, cooked 1 cup 177 mg   Broccoli, cooked 1 cup 62 mg   Protein-rich foods  Some proteins, such as tofu with added calcium sulfate, and canned fish with bones, are high in calcium. You may need to read the labels closely to decipher which foods can deliver your daily calcium dose.  Food Serving Size Calcium   Tofu, prepared with calcium sulfate 1/2 cup 434 mg   Sardines, canned, with bones 3 ounces 324 mg   Soy milk, fortified 1 cup 299 mg   Black beans, canned 1 cup 239 mg   [place], canned, with bones 3 ounces 181 mg   Grains  Grains aren’t usually rich in calcium but are an important part of a balanced diet. Some breads and cereals are fortified and can be a great option for boosting your calcium intake. Check packaging and labels to find fortified options that you might enjoy.  Nuts and seeds  Some nuts and seeds contain a robust amount of calcium. Try incorporating nish or sesame seeds into a smoothie, salad or your morning cereal.  Food Serving Size Calcium   [place] milk, unsweetened 1 cup 482 mg   Almonds 1/4 cup 92 mg   Sesame seeds 1 tablespoon 88 mg   Nish seeds 1 tablespoon 78 mg   Tahini (sesame butter or paste) 1 tablespoon 64 mg

## 2024-10-28 NOTE — PROGRESS NOTES
Subjective     Ashleigh Peng is a 74 y.o. female who presents for a subsequent annual wellness visit.     Patient Care Team:  Peter Solorzano MD as PCP - General (Family Medicine)  Pepe Jasmine MD as Consulting Physician (Obstetrics and Gynecology)  Kun Guzman MD as Consulting Physician (Cardiology)  Donis Patten MD as Consulting Physician (Ophthalmology)  Jon Solorzano MD as Consulting Physician (Oncology)  Matt Bacon MD as Consulting Physician (Dermatology)  Brady Schwab MD as Consulting Physician (Otolaryngology)  Chelsea Dobbs RRT as Respiratory Therapist (Pulmonary)  Shiraz Kulkarni MD as Consulting Physician (Dermatology)    Comprehensive Medical and Social History  Patient Active Problem List   Diagnosis    Hyperlipidemia    Malignant neoplasm of unspecified site of right female breast (CMS/HCC)    Medicare annual wellness visit, subsequent    Diastolic dysfunction    Malignant neoplasm of female breast (CMS/HCC)    Mitral regurgitation    Osteopenia of both hips    Asymmetrical hearing loss     Past Medical History:   Diagnosis Date    Diastolic dysfunction 12/11/2019    Seen on echocardiogram in 12/2019. Has EF of 60%, mild mitral regurgitation and mild tricuspid regurgitation. Has minimally elevated transaortic gradient. Echocardiogram on 1/22/2021: Normal left ventricular systolic function without segmental wall motion abnormalities.  Ejection fraction 60%.  Abnormal diastolic function.  Calcified trileaflet aortic valve with decreased motion but no significant    Hyperglycemia 12/12/2018    Hyperlipidemia 10/26/2015    Cardiologist: Dr. Guzman.    Managed with Rosuvastatin, Ezetimibe, and Repatha.    Requires periodic monitoring of lipids and liver function tests.     Advised to follow low fat diet and to keep BMI < 25.     Advised to exercise for 2.5 hours per week.          Malignant neoplasm of female breast (CMS/HCC) 03/01/2007    Dx  BREAST CANCER, Dx Date 3/2007,  Histology  , Stage @ Dx  T1,N0, ER/MI+ HERII+, Disease Status  SIN, Tx Status T      Osteopenia of both hips 10/13/2021    DEXA scan done in 10/2018. LS -0.1, LH -1.7, RH -1.5.  DEXA Scan 12/2021 with LS -0.2, LH -1.5, RH -1.4.  DEXA Scan 12/2023 with LS -1.5, LH -1.5, RH -1.5 Recheck DEXA Scan in 12/2025 Managed with vitamin D3 and weight bearing exercise.      Past Surgical History   Procedure Laterality Date    Breast lumpectomy Right 2007    Dr. Bragg    [facility] [place] surgery Right 06/2023    right leg    [place] tooth extraction       Allergies   Allergen Reactions    No Known Allergies      Current Outpatient Medications   Medication Sig Dispense Refill    aspirin 81 mg enteric coated tablet Take 81 mg by mouth daily.       cholecalciferol, vitamin D3, 2,000 unit tablet Take 2,000 Units by mouth daily.      cyanocobalamin (VITAMIN B12) 1,000 mcg tablet Take 1,000 mcg by mouth daily.      evolocumab (REPATHA SURECLICK) 140 mg/mL pen Inject 1 mL (140 mg total) under the skin every 14 (fourteen) days. 6 mL 11    ezetimibe (ZETIA) 10 mg tablet Take 1 tablet (10 mg total) by mouth daily. 90 tablet 3    multivitamin-iron-folic acid  mg-mcg tablet Take 1 tablet by mouth daily.      rosuvastatin (CRESTOR) 40 mg tablet Take 1 tablet (40 mg total) by mouth nightly. 90 tablet 3    ubidecarenone (COENZYME Q10 ORAL) Take 1 tablet by mouth daily.      varenicline (CHANTIX STARTING MONTH BOX) 0.5 mg (11)- 1 mg (42) tablet Take 0.5 mg one daily on days 1-3. Then take 0.5 mg twice daily on days 4-7. Then take 1 mg twice daily for a total of 12 weeks. 42 tablet 0     No current facility-administered medications for this visit.     Social History     Tobacco Use    Smoking status: Every Day     Current packs/day: 1.00     Average packs/day: 1 pack/day for 56.8 years (56.8 ttl pk-yrs)     Types: Cigarettes     Start date: 1968     Passive exposure: Past    Smokeless tobacco: Current    Tobacco comments:     Overall ave=1ppd,  currently about 1/2   Vaping Use    Vaping status: Never Used   Substance Use Topics    Alcohol use: Yes     Alcohol/week: 2.0 - 5.0 standard drinks of alcohol     Types: 2 - 5 Standard drinks or equivalent per week     Comment: soc    Drug use: Never     Family History   Problem Relation Name Age of Onset    Kidney disease Biological Mother      Heart disease Biological Mother      Hyperlipidemia Biological Mother      COPD Biological Father      Other Biological Sister          Meningioma    Hyperlipidemia Biological Brother      Throat cancer Biological Brother      No Known Problems Biological Brother      Brain cancer Paternal Grandmother      Lung cancer Paternal Grandfather      Hyperlipidemia Biological Daughter      Heart disease Biological Daughter          CAD - S/P MI    Hyperlipidemia Biological Son      Hyperlipidemia Biological Son       Review of Systems   Constitutional:  Negative for appetite change, chills, fatigue, fever and unexpected weight change.   HENT:  Negative for congestion, ear pain, hearing loss, nosebleeds, postnasal drip, rhinorrhea, sinus pressure, sneezing, sore throat and trouble swallowing.    Eyes:  Negative for pain, discharge, redness, itching and visual disturbance.   Respiratory:  Negative for cough, chest tightness, shortness of breath and wheezing.    Cardiovascular:  Negative for chest pain, palpitations and leg swelling.   Gastrointestinal:  Negative for abdominal pain, blood in stool, constipation, diarrhea, nausea and vomiting.   Endocrine: Negative for cold intolerance and heat intolerance.   Genitourinary:  Negative for difficulty urinating, dysuria, frequency, hematuria, urgency, vaginal bleeding and vaginal discharge.   Musculoskeletal:  Negative for arthralgias, back pain, gait problem, joint swelling, myalgias and neck pain.   Skin:  Negative for rash.   Allergic/Immunologic: Negative for environmental allergies.   Neurological:  Negative for dizziness, syncope,  "speech difficulty, weakness, light-headedness, numbness and headaches.   Hematological:  Does not bruise/bleed easily.   Psychiatric/Behavioral:  Negative for confusion, dysphoric mood and sleep disturbance. The patient is not nervous/anxious.      Objective   Vitals  Vitals:    10/28/24 1345   BP: 98/66   BP Location: Left upper arm   Pulse: 89   Resp: 16   Temp: 36.9 °C (98.5 °F)   TempSrc: Oral   SpO2: 98%   Weight: 61.3 kg (135 lb 3.2 oz)  Comment: without shoes   Height: 1.689 m (5' 6.5\")  Comment: without shoes     Body mass index is 21.49 kg/m².  Wt Readings from Last 3 Encounters:   10/28/24 61.3 kg (135 lb 3.2 oz)   05/29/24 59.9 kg (132 lb)   12/20/23 59.4 kg (131 lb)     Physical Exam  Constitutional:       General: She is not in acute distress.     Appearance: Normal appearance. She is well-developed. She is not ill-appearing, toxic-appearing or diaphoretic.   HENT:      Head: Normocephalic and atraumatic.      Right Ear: Tympanic membrane, ear canal and external ear normal.      Left Ear: Tympanic membrane, ear canal and external ear normal.      Nose: No mucosal edema, congestion or rhinorrhea.      Right Sinus: No maxillary sinus tenderness or frontal sinus tenderness.      Left Sinus: No maxillary sinus tenderness or frontal sinus tenderness.      Mouth/Throat:      Mouth: No oral lesions.      Pharynx: Uvula midline. No oropharyngeal exudate or posterior oropharyngeal erythema.      Tonsils: No tonsillar exudate.   Eyes:      General: Lids are normal. No scleral icterus.        Right eye: No discharge.         Left eye: No discharge.      Conjunctiva/sclera:      Right eye: Right conjunctiva is not injected. No exudate.     Left eye: Left conjunctiva is not injected. No exudate.     Pupils: Pupils are equal, round, and reactive to light.   Neck:      Thyroid: No thyroid mass or thyromegaly.      Vascular: No carotid bruit.   Cardiovascular:      Rate and Rhythm: Normal rate and regular rhythm.      " Pulses:           Popliteal pulses are 2+ on the right side and 2+ on the left side.        Dorsalis pedis pulses are 2+ on the right side and 2+ on the left side.      Heart sounds: Normal heart sounds. No murmur heard.     No gallop. No S3 or S4 sounds.   Pulmonary:      Effort: Pulmonary effort is normal. No respiratory distress.      Breath sounds: Normal breath sounds. No decreased breath sounds, wheezing, rhonchi or rales.   Chest:      Chest wall: No tenderness.   Abdominal:      General: Bowel sounds are normal. There is no distension.      Palpations: Abdomen is soft.      Tenderness: There is no abdominal tenderness. There is no guarding or rebound.      Hernia: No hernia is present.   Musculoskeletal:      Cervical back: Normal range of motion and neck supple.      Right lower leg: No edema.      Left lower leg: No edema.   Lymphadenopathy:      Cervical: No cervical adenopathy.   Skin:     General: Skin is warm and dry.      Findings: No rash.   Neurological:      Mental Status: She is alert.   Psychiatric:         Mood and Affect: Mood normal.         Speech: Speech normal.         Behavior: Behavior normal.         Thought Content: Thought content normal.         Judgment: Judgment normal.       Advanced Care Plan  Does patient have advance directive?: Yes (just redid)       Patient has Advance Directive: Advance Directive is NOT in chart, requested to bring in                             PHQ  Will the patient answer the depression questions?: Yes   Little interest or pleasure in doing things: Not at all   Feeling down, depressed, or hopeless: Not at all   Depression Risk: 0                                             Mini Cog  Completed: Yes  Score: 5  Result: Negative  Get Up and Go  Result: Pass    STEADI Falls Risk  One or more falls in the last year: Yes   How many times: 1 (tripped on the 2 nd to the last step fell  on knees)   Was the patient injured in any fall: No   Has trouble stepping up  onto a curb: No   Advised to use a cane or walker to get around safely: No   Often has to rush to the toilet: No   Feels unsteady when walking: No   Has lost some feeling in feet: No   Often feels sad or depressed: No   Steadies self on furniture while walking at home: No   Takes medication that makes him/her feel lightheaded or more tired than usual: No   Worried about falling: No   Takes medicine to sleep or improve mood: No   Needs to push with hands when rising from a chair: No   Falls screen completed: Yes     Hearing and Vision Screening  No results found.  See HRA for relevant hearing screening response.    Diet and Exercise   Heart healthy diet    See social determinants for exercise      Immunization History   Administered Date(s) Administered    Hepatitis A 01/15/2015, 06/06/2015    INFLUENZA VACCINE QUAD ADJUVANTED 65 and OLDER 09/15/2020    Influenza Trivalent High Dose Preservative Free 65 yrs and up IM 10/26/2015    Influenza Vaccine 65 And Older Preservative Free 10/03/2024    Influenza Vaccine High Dose 65 And Older 10/01/2019, 09/23/2021, 10/06/2022    Influenza Vaccine Quadrivalent 3 Yr And Older 01/01/2009, 01/01/2010, 01/01/2011, 01/01/2012, 01/01/2013, 01/01/2014    Influenza Vaccine Quadrivalent Preservative Free 6-35 Months 10/31/2016    Influenza, Unspecified 11/20/2013, 11/01/2016, 10/01/2018, 10/01/2019, 09/15/2020, 10/06/2022, 10/18/2023    Pneumococcal Conjugate 13-Valent 10/26/2015    Pneumococcal Polysaccharide 10/12/2011, 11/26/2019    RESPIRATORY SYNCYTIAL VIRUS (RSV), VACCINE, RECOMB, AREXVY 10/18/2023    SARS-COV-2 (COVID-19) VACCINE PFIZER BIVALENT 12 years and older ([place]) 10/24/2022, 07/12/2023    SARS-COV-2 (COVID-19) VACCINE, Pfizer / BioNTech Comirnaty 06/12/2024, 10/03/2024    SARS-COV-2 (COVID19) VACCINE, PFIZER MONOVALENT 02/12/2021, 03/13/2021, 10/14/2021, 04/21/2022    Tdap 10/10/2010, 04/12/2021    Javid-Sucrose (Covid-19) Andres Valera, Sultana Monovalent 04/21/2022     Zoster 12/09/2015    Zoster Vaccine Recombinant Adjuvanted (Shingrix) 04/12/2021, 08/10/2021       Health Maintenance Topics with due status: Overdue       Topic Date Due    Medicare Annual Wellness Visit 10/26/2024     Health Maintenance Topics with due status: Not Due       Topic Last Completion Date    Colorectal Cancer Screening 11/08/2016    DTaP, Tdap, and Td Vaccines 04/12/2021    DEXA Scan 12/11/2023    Lung Cancer Screening 05/15/2024    COVID-19 Vaccine 10/03/2024    Breast Cancer Screening 10/16/2024    Depression Screening 10/28/2024    Falls Risk Screening 10/28/2024     Health Maintenance Topics with due status: Completed       Topic Last Completion Date    Hepatitis C Screening 12/12/2018    Pneumococcal (65 years and older) 11/26/2019    Zoster Vaccine 08/10/2021    RSV Vaccine 10/18/2023    Influenza Vaccine 10/03/2024     Health Maintenance Topics with due status: Aged Out       Topic Date Due    Meningococcal ACWY Aged Out    RSV <20 months Aged Out    HIB Vaccines Aged Out    Hepatitis B Vaccines Aged Out    IPV Vaccines Aged Out    HPV Vaccines Aged Out       Lab Results   Component Value Date    WBC 5.90 12/01/2023    WBC 5.52 12/16/2022    WBC 6.52 10/27/2021    HGB 13.4 12/01/2023    HGB 13.9 12/16/2022    HGB 14.1 10/27/2021    HCT 42.2 12/01/2023    HCT 41.9 12/16/2022    HCT 43.4 10/27/2021    .2 (H) 12/01/2023    MCV 96.1 12/16/2022    .0 (H) 10/27/2021     12/01/2023     12/16/2022     10/27/2021       Lab Results   Component Value Date    GLUCOSE 100 (H) 12/01/2023    GLUCOSE 103 (H) 12/16/2022    GLUCOSE 113 (H) 10/27/2021    CALCIUM 9.8 12/01/2023    CALCIUM 9.8 12/16/2022    CALCIUM 9.7 10/27/2021     12/01/2023     12/16/2022     10/27/2021    K 4.4 12/01/2023    K 4.3 12/16/2022    K 4.4 10/27/2021    CO2 32 (H) 12/01/2023    CO2 27 12/16/2022    CO2 24 10/27/2021     12/01/2023     12/16/2022     10/27/2021     BUN 20 12/01/2023    BUN 14 12/16/2022    BUN 13 10/27/2021    CREATININE 0.9 12/01/2023    CREATININE 0.9 12/16/2022    CREATININE 0.9 10/27/2021       Lab Results   Component Value Date    ALT 18 12/01/2023    ALT 27 12/16/2022    ALT 26 10/27/2021    AST 22 12/01/2023    AST 28 12/16/2022    AST 26 10/27/2021    GGT 10 06/03/2015    ALKPHOS 56 12/01/2023    ALKPHOS 56 12/16/2022    ALKPHOS 53 10/27/2021    BILITOT 0.4 12/01/2023    BILITOT 0.9 12/16/2022    BILITOT 0.8 10/27/2021       Lab Results   Component Value Date    CHOL 97 09/12/2024    CHOL 261 (H) 05/16/2024    CHOL 182 12/01/2023     Lab Results   Component Value Date    HDL 54 09/12/2024    HDL 73 05/16/2024    HDL 62 12/01/2023     Lab Results   Component Value Date    LDLCALC 33 09/12/2024    LDLCALC 170 (H) 05/16/2024    LDLCALC 108 (H) 12/01/2023     Lab Results   Component Value Date    TRIG 52 09/12/2024    TRIG 88 05/16/2024    TRIG 58 12/01/2023       Lab Results   Component Value Date    TSH 2.33 12/01/2023    TSH 2.19 12/16/2022    TSH 1.74 10/27/2021       Lab Results   Component Value Date    HEPCAB Nonreactive 12/12/2018       Assessment/Plan   Diagnoses and all orders for this visit:    Medicare annual wellness visit, subsequent (Primary)  Assessment & Plan:  The patient is currently stable.   Bloodwork was ordered.   Further recommendations will be provided to the patient based on the results of the blood tests.   The patient should continue to exercise for 2.5 hours per week.   All the recommend vaccinations are up to date.   The patient should be evaluated by the ophthalmologist every year and dentist every 6 months.   The patient was advised to protect the skin by applying suntan lotion containing an SPF > 30 every 2 hours while in sun or after swimming. Instructed to avoid prolonged direct sun exposure as much as possible.   The patient's body mass index is 21.   The patient's mammogram are up to date.   The patient's DEXA scan is  up to date.   The patient's colonoscopy is up to date.   Advised to consume 1000 mg of calcium daily through the diet. If the patient is unable to consume 1000 mg through the diet, then taking calcium supplements is an acceptable alternative. The patient was informed not to consume more than 500 mg of a calcium supplement at a time.     Foods that are rich in calcium  Many foods can help increase calcium in your diet, even if you have lactose intolerance or eat a vegan diet. Try eating a variety of these foods to meet your daily calcium requirements:  Dairy products  Milk and dairy products are among the best calcium sources. Just a few servings per day can give you all the calcium you need. When choosing dairy products, don’t forget to look at the calories, fat and sodium, which can be high in some dairy foods.  Food Serving Size Calcium   Vanilla yogurt (low-fat) 8 ounces 388 mg   Milk, 1% 1 cup 310 mg   Ricotta cheese, whole milk 1/2 cup 289 mg   Greek yogurt, plain (low-fat) 8 ounces 261 mg   Cottage cheese, 2% fat 1 cup 227 mg   Cheddar cheese 1 ounce 200 mg   Vegetables and fruit juices  Leafy greens and broccoli are excellent sources of calcium. Generally, fruits aren’t calcium-rich, but some juices are fortified. This means the  has added calcium to the product. Fortified orange juice has more calcium than a glass of milk.  Food Serving Size Calcium   Orange juice, calcium-fortified 1 cup 349 mg   Nadia greens, cooked 1 cup 268 mg   Spinach, cooked 1 cup 245 mg   Bok mihaela, cooked 1 cup 158 mg   Kale, cooked 1 cup 177 mg   Broccoli, cooked 1 cup 62 mg   Protein-rich foods  Some proteins, such as tofu with added calcium sulfate, and canned fish with bones, are high in calcium. You may need to read the labels closely to decipher which foods can deliver your daily calcium dose.  Food Serving Size Calcium   Tofu, prepared with calcium sulfate 1/2 cup 434 mg   Sardines, canned, with bones 3 ounces  324 mg   Soy milk, fortified 1 cup 299 mg   Black beans, canned 1 cup 239 mg   Wallback, canned, with bones 3 ounces 181 mg   Grains  Grains aren’t usually rich in calcium but are an important part of a balanced diet. Some breads and cereals are fortified and can be a great option for boosting your calcium intake. Check packaging and labels to find fortified options that you might enjoy.  Nuts and seeds  Some nuts and seeds contain a robust amount of calcium. Try incorporating allen or sesame seeds into a smoothie, salad or your morning cereal.  Food Serving Size Calcium   Gum Spring milk, unsweetened 1 cup 482 mg   Almonds 1/4 cup 92 mg   Sesame seeds 1 tablespoon 88 mg   Allen seeds 1 tablespoon 78 mg   Tahini (sesame butter or paste) 1 tablespoon 64 mg         Malignant neoplasm of right female breast, unspecified estrogen receptor status, unspecified site of breast (CMS/HCC)    Mixed hyperlipidemia  -     CBC and Differential; Future  -     Comprehensive metabolic panel; Future  -     Hemoglobin A1c; Future  -     TSH 3rd Generation; Future    IFG (impaired fasting glucose)  -     Hemoglobin A1c; Future    Other orders  -     varenicline (CHANTIX STARTING MONTH BOX) 0.5 mg (11)- 1 mg (42) tablet; Take 0.5 mg one daily on days 1-3. Then take 0.5 mg twice daily on days 4-7. Then take 1 mg twice daily for a total of 12 weeks.        See Patient Instructions (the written plan) which was given to the patient for PPPS and health risk factors with interventions.

## 2024-11-25 ENCOUNTER — HOSPITAL ENCOUNTER (OUTPATIENT)
Dept: RADIOLOGY | Facility: CLINIC | Age: 74
Discharge: HOME | End: 2024-11-25
Attending: NURSE PRACTITIONER
Payer: MEDICARE

## 2024-11-25 ENCOUNTER — OFFICE VISIT (OUTPATIENT)
Dept: PRIMARY CARE | Facility: CLINIC | Age: 74
End: 2024-11-25
Payer: MEDICARE

## 2024-11-25 VITALS
HEART RATE: 88 BPM | DIASTOLIC BLOOD PRESSURE: 80 MMHG | TEMPERATURE: 98.3 F | OXYGEN SATURATION: 94 % | BODY MASS INDEX: 21.21 KG/M2 | HEIGHT: 66 IN | SYSTOLIC BLOOD PRESSURE: 144 MMHG | WEIGHT: 132 LBS

## 2024-11-25 DIAGNOSIS — R05.3 PERSISTENT COUGH: ICD-10-CM

## 2024-11-25 DIAGNOSIS — J06.9 VIRAL UPPER RESPIRATORY TRACT INFECTION: Primary | ICD-10-CM

## 2024-11-25 PROCEDURE — 71046 X-RAY EXAM CHEST 2 VIEWS: CPT

## 2024-11-25 PROCEDURE — 99213 OFFICE O/P EST LOW 20 MIN: CPT | Performed by: NURSE PRACTITIONER

## 2024-11-25 RX ORDER — METHYLPREDNISOLONE 4 MG/1
TABLET ORAL
Qty: 21 TABLET | Refills: 0 | Status: SHIPPED | OUTPATIENT
Start: 2024-11-25 | End: 2024-12-02

## 2024-11-25 ASSESSMENT — ENCOUNTER SYMPTOMS
SORE THROAT: 0
WHEEZING: 0
SHORTNESS OF BREATH: 1
NAUSEA: 0
VOMITING: 0
DYSURIA: 0
HEMATURIA: 0
RHINORRHEA: 1
FEVER: 0
SINUS PAIN: 0
COUGH: 1
DIARRHEA: 0
SINUS PRESSURE: 0
CHILLS: 0

## 2024-11-25 NOTE — PATIENT INSTRUCTIONS
Nasal saline spray 3 x per day    Salt water gargles     Use humidifier at night    Hydration with water and plenty of rest    Start antihistamine

## 2024-11-25 NOTE — ASSESSMENT & PLAN NOTE
8 day duration of upper respiratory symptoms as above (HPI)  Notable for cough, shortness of breath on exertion, rhinorrhea  Afebrile, exam and vitals stable  Lung sounds diminished, rare exp wheeze  Has completed azithromycin 11/22/2024   Will obtain chest xray   Start medrol- take with food  Reviewed supportive management including rest, hydration, tylenol as needed for fever/body aches/ headache,  humidified air  Encouraged flonase, nasal saline and antihistamine  Patient hesitant to start nasal spray, inhaler so will hold for now  Follow up 2 weeks with persisting symptoms, immediate if worsening/changes

## 2024-11-25 NOTE — PROGRESS NOTES
NEHA Salinas    [facility]  [address], [address]  [place] [address]  Phone: [phone]  Fax: [phone]     History of Present Illness         Patient ID: Ashleigh Peng is a 74 y.o. female.    PCP: Peter Solorzano MD      HPI    Patient presents for evaluation of upper respiratory symptoms  Symptom onset 11/17/2024, approximately 8 days ago  To note took zpack and felt slightly better, then symptoms returned over past 2 days    Symptoms:  Rhinorrhea- clear   Cough  Post nasal drip   Phlegm slightly yellow  Cough Wakes patient at night    Denies Sinus pain, pressure  Denies Ear pain   Denies Sore throat  Denies Wheezing    Noticed + Shortness of breath on exertion      Staying well hydrated       Treatment tried: Completed zpack 11/22/2024  Does not like nasal sprays, inhalers      Recent travel- cruise (returned home yesterday 11/24/2024)      Denies pulmonary history   To note, current smoker      Denies known seasonal allergies       Past Medical/Surgical/Family/Social History         The following have been reviewed and updated as appropriate in this visit:   Tobacco  Allergies  Meds  Problems  Med Hx  Surg Hx  Fam Hx         Past Medical History:   Diagnosis Date    Diastolic dysfunction 12/11/2019    Seen on echocardiogram in 12/2019. Has EF of 60%, mild mitral regurgitation and mild tricuspid regurgitation. Has minimally elevated transaortic gradient. Echocardiogram on 1/22/2021: Normal left ventricular systolic function without segmental wall motion abnormalities.  Ejection fraction 60%.  Abnormal diastolic function.  Calcified trileaflet aortic valve with decreased motion but no significant    Hyperglycemia 12/12/2018    Hyperlipidemia 10/26/2015    Cardiologist: Dr. Guzman.    Managed with Rosuvastatin, Ezetimibe, and Repatha.    Requires periodic monitoring of lipids and liver function tests.     Advised to follow low fat diet and to keep  BMI < 25.     Advised to exercise for 2.5 hours per week.          Malignant neoplasm of female breast (CMS/HCC) 03/01/2007    Dx  BREAST CANCER, Dx Date 3/2007, Histology  , Stage @ Dx  T1,N0, ER/AR+ HERII+, Disease Status  SIN, Tx Status T      Osteopenia of both hips 10/13/2021    DEXA scan done in 10/2018. LS -0.1, LH -1.7, RH -1.5.  DEXA Scan 12/2021 with LS -0.2, LH -1.5, RH -1.4.  DEXA Scan 12/2023 with LS -1.5, LH -1.5, RH -1.5 Recheck DEXA Scan in 12/2025 Managed with vitamin D3 and weight bearing exercise.        Past Surgical History   Procedure Laterality Date    Breast lumpectomy Right 2007    Dr. Brill Mohs surgery Right 06/2023    right leg    Wever tooth extraction         Family History   Problem Relation Name Age of Onset    Kidney disease Biological Mother      Heart disease Biological Mother      Hyperlipidemia Biological Mother      COPD Biological Father      Other Biological Sister          Meningioma    Hyperlipidemia Biological Brother      Throat cancer Biological Brother      No Known Problems Biological Brother      Brain cancer Paternal Grandmother      Lung cancer Paternal Grandfather      Hyperlipidemia Biological Daughter      Heart disease Biological Daughter          CAD - S/P MI    Hyperlipidemia Biological Son      Hyperlipidemia Biological Son         Social History     Tobacco Use    Smoking status: Every Day     Current packs/day: 1.00     Average packs/day: 1 pack/day for 56.9 years (56.9 ttl pk-yrs)     Types: Cigarettes     Start date: 1968     Passive exposure: Past    Smokeless tobacco: Current    Tobacco comments:     Overall ave=1ppd, currently about 1/2   Vaping Use    Vaping status: Never Used   Substance Use Topics    Alcohol use: Yes     Alcohol/week: 2.0 - 5.0 standard drinks of alcohol     Types: 2 - 5 Standard drinks or equivalent per week     Comment: soc    Drug use: Never       Patient Care Team:  Peter Solorzano MD as PCP - General (Family  Medicine)  Pepe Jasmine MD as Consulting Physician (Obstetrics and Gynecology)  Kun Guzman MD as Consulting Physician (Cardiology)  Donis Patten MD as Consulting Physician (Ophthalmology)  Jon Solorzano MD as Consulting Physician (Oncology)  Matt Bacon MD as Consulting Physician (Dermatology)  Brady Schwab MD as Consulting Physician (Otolaryngology)  Chelsea Dobbs, NICOLE as Respiratory Therapist (Pulmonary)  Shiraz Kulkarni MD as Consulting Physician (Dermatology)    Allergies and Medications         Allergies   Allergen Reactions    No Known Allergies          Current Outpatient Medications   Medication Sig Dispense Refill    aspirin 81 mg enteric coated tablet Take 81 mg by mouth daily.       cholecalciferol, vitamin D3, 2,000 unit tablet Take 2,000 Units by mouth daily.      cyanocobalamin (VITAMIN B12) 1,000 mcg tablet Take 1,000 mcg by mouth daily.      evolocumab (REPATHA SURECLICK) 140 mg/mL pen Inject 1 mL (140 mg total) under the skin every 14 (fourteen) days. 6 mL 11    ezetimibe (ZETIA) 10 mg tablet Take 1 tablet (10 mg total) by mouth daily. 90 tablet 3    methylPREDNISolone (MEDROL DOSEPACK) 4 mg tablet Follow package directions. 21 tablet 0    multivitamin-iron-folic acid  mg-mcg tablet Take 1 tablet by mouth daily.      rosuvastatin (CRESTOR) 40 mg tablet Take 1 tablet (40 mg total) by mouth nightly. 90 tablet 3    ubidecarenone (COENZYME Q10 ORAL) Take 1 tablet by mouth daily.      varenicline (CHANTIX STARTING MONTH BOX) 0.5 mg (11)- 1 mg (42) tablet Take 0.5 mg one daily on days 1-3. Then take 0.5 mg twice daily on days 4-7. Then take 1 mg twice daily for a total of 12 weeks. 42 tablet 0     No current facility-administered medications for this visit.       Review of Systems         Review of Systems   Constitutional:  Negative for chills and fever.   HENT:  Positive for congestion, postnasal drip and rhinorrhea. Negative for ear pain, sinus pressure, sinus  "pain and sore throat.    Respiratory:  Positive for cough and shortness of breath. Negative for wheezing.    Cardiovascular:  Negative for chest pain.   Gastrointestinal:  Negative for diarrhea, nausea and vomiting.   Genitourinary:  Negative for dysuria and hematuria.         Physical Examination           Vitals:    11/25/24 1517   BP: (!) 144/80   Pulse: 88   Temp: 36.8 °C (98.3 °F)   SpO2: 94%       Wt Readings from Last 3 Encounters:   11/25/24 59.9 kg (132 lb)   10/28/24 61.3 kg (135 lb 3.2 oz)   05/29/24 59.9 kg (132 lb)       Ht Readings from Last 1 Encounters:   11/25/24 1.689 m (5' 6.5\")       BMI Readings from Last 3 Encounters:   11/25/24 20.99 kg/m²   10/28/24 21.49 kg/m²   05/29/24 21.31 kg/m²       Physical Exam  Vitals reviewed.   Constitutional:       General: She is not in acute distress.     Appearance: Normal appearance. She is not ill-appearing, toxic-appearing or diaphoretic.   HENT:      Head: Normocephalic.      Right Ear: Tympanic membrane is not erythematous.      Left Ear: Tympanic membrane is not erythematous.      Ears:      Comments: Fluid bilateral TM     Nose: Mucosal edema and congestion present. No rhinorrhea.      Right Sinus: No maxillary sinus tenderness or frontal sinus tenderness.      Left Sinus: No maxillary sinus tenderness or frontal sinus tenderness.      Mouth/Throat:      Lips: Pink.      Mouth: Mucous membranes are moist.      Pharynx: Postnasal drip present. No pharyngeal swelling, oropharyngeal exudate or posterior oropharyngeal erythema.   Eyes:      General: No scleral icterus.     Conjunctiva/sclera: Conjunctivae normal.   Cardiovascular:      Rate and Rhythm: Normal rate and regular rhythm.      Pulses: Normal pulses.      Heart sounds: Normal heart sounds. No murmur heard.  Pulmonary:      Effort: Pulmonary effort is normal. No respiratory distress.      Breath sounds: Normal breath sounds. No stridor. No wheezing, rhonchi or rales.      Comments: diminished lung " sounds bilateral bases, rare exp wheeze   Musculoskeletal:      Cervical back: Normal range of motion.      Right lower leg: No edema.      Left lower leg: No edema.   Lymphadenopathy:      Cervical: No cervical adenopathy.   Skin:     General: Skin is warm.   Neurological:      General: No focal deficit present.      Mental Status: She is alert and oriented to person, place, and time.   Psychiatric:         Mood and Affect: Mood normal.         Behavior: Behavior normal.         Thought Content: Thought content normal.           Assessment and Plan         Assessment/Plan     Diagnoses and all orders for this visit:    Viral upper respiratory tract infection (Primary)  Assessment & Plan:  8 day duration of upper respiratory symptoms as above (HPI)  Notable for cough, shortness of breath on exertion, rhinorrhea  Afebrile, exam and vitals stable  Lung sounds diminished, rare exp wheeze  Has completed azithromycin 11/22/2024   Will obtain chest xray   Start medrol- take with food  Reviewed supportive management including rest, hydration, tylenol as needed for fever/body aches/ headache,  humidified air  Encouraged flonase, nasal saline and antihistamine  Patient hesitant to start nasal spray, inhaler so will hold for now  Follow up 2 weeks with persisting symptoms, immediate if worsening/changes          Persistent cough  Assessment & Plan:  8 day duration of upper respiratory symptoms as above (HPI)  Notable for cough, shortness of breath on exertion, rhinorrhea  Afebrile, exam and vitals stable  Lung sounds diminished, rare exp wheeze  Has completed azithromycin 11/22/2024   Will obtain chest xray   Start medrol- take with food  Reviewed supportive management including rest, hydration, tylenol as needed for fever/body aches/ headache,  humidified air  Encouraged flonase, nasal saline and antihistamine  Patient hesitant to start nasal spray, inhaler so will hold for now  Follow up 2 weeks with persisting symptoms,  immediate if worsening/changes        Orders:  -     X-RAY CHEST 2 VIEWS; Future  -     methylPREDNISolone (MEDROL DOSEPACK) 4 mg tablet; Follow package directions.         No follow-ups on file.    Orders Placed This Encounter   Procedures    X-RAY CHEST 2 VIEWS     Standing Status:   Future     Number of Occurrences:   1     Standing Expiration Date:   11/25/2025     Order Specific Question:   Release to patient     Answer:   Immediate [1]               SILVANO Schroeder    11/25/2024    I spent a total of 20 minutes on this date of service performing the following activities:  Pre-visit medical record and diagnostic testing and medical consultant report review, obtaining history from patient, performing examination, entering orders, comprehensive medication reconciliation, counseling and education, and documentation of visit.       n/a

## 2024-12-04 ENCOUNTER — OFFICE VISIT (OUTPATIENT)
Dept: CARDIOLOGY | Facility: CLINIC | Age: 74
End: 2024-12-04
Payer: MEDICARE

## 2024-12-04 VITALS
OXYGEN SATURATION: 98 % | SYSTOLIC BLOOD PRESSURE: 108 MMHG | HEART RATE: 81 BPM | BODY MASS INDEX: 21.53 KG/M2 | WEIGHT: 134 LBS | DIASTOLIC BLOOD PRESSURE: 70 MMHG | HEIGHT: 66 IN

## 2024-12-04 DIAGNOSIS — E78.2 MIXED HYPERLIPIDEMIA: ICD-10-CM

## 2024-12-04 DIAGNOSIS — R01.1 MURMUR: ICD-10-CM

## 2024-12-04 DIAGNOSIS — I25.10 CORONARY ARTERY DISEASE, UNSPECIFIED VESSEL OR LESION TYPE, UNSPECIFIED WHETHER ANGINA PRESENT, UNSPECIFIED WHETHER NATIVE OR TRANSPLANTED HEART: Primary | ICD-10-CM

## 2024-12-04 DIAGNOSIS — E78.49 FAMILIAL HYPERLIPIDEMIA: ICD-10-CM

## 2024-12-04 LAB
ATRIAL RATE: 81
P AXIS: 63
PR INTERVAL: 142
QRS DURATION: 80
QT INTERVAL: 366
QTC CALCULATION(BAZETT): 425
R AXIS: -37
T WAVE AXIS: 72
VENTRICULAR RATE: 81

## 2024-12-04 PROCEDURE — 93000 ELECTROCARDIOGRAM COMPLETE: CPT | Performed by: INTERNAL MEDICINE

## 2024-12-04 PROCEDURE — 99214 OFFICE O/P EST MOD 30 MIN: CPT | Performed by: INTERNAL MEDICINE

## 2024-12-04 NOTE — PROGRESS NOTES
[facility] Cardiology  [place] Office       Patient ID: Ashleigh Peng 74 y.o. female 1950  PCP: Peter Solorzano MD      HPI     Ashleigh Peng is a 74 y.o. female I the pleasure seen in the office today for routine cardiovascular follow-up.    She has a background of familial hypercholesterolemia, coronary calcifications on CT scan, mild mitral valve prolapse and regurgitation, tobacco abuse and prior breast cancer.  Her grandson tested positive for LDLR gene mutation consistent with autosomal dominant familial hypercholesterolemia.    She presents today for routine follow-up.  She is on Repatha, Crestor, and Zetia and had blood work done recently on this combination showing dramatic reduction in cholesterol.  She feels well on the medications.  She has cut back on her smoking and just started Chantix a few days ago and feels that she is going to make a real attempt at quitting.    She got back from a cruise recently and had some shortness of breath about a week ago.  She was given a course of steroids and is back to normal.  She says other than this she feels great and is not having any chest pain, other shortness of breath, palpitations, syncope, lower extremity edema, or claudication.           Medications     Current Outpatient Medications   Medication Instructions    aspirin 81 mg, Daily    cholecalciferol (vitamin D3) 2,000 Units, Daily    cyanocobalamin (VITAMIN B12) 1,000 mcg, Daily    ezetimibe (ZETIA) 10 mg, oral, Daily    multivitamin-iron-folic acid  mg-mcg tablet 1 tablet, Daily    REPATHA SURECLICK 140 mg, subcutaneous, Every 14 days    rosuvastatin (CRESTOR) 40 mg, oral, Nightly    ubidecarenone (COENZYME Q10 ORAL) 1 tablet, Daily    varenicline (CHANTIX) 1 mg, oral, 2 times daily, Take with full glass of water.         Allergies     No known allergies     Vital Signs/Exam     Vitals:    12/04/24 1043   BP: 108/70   BP Location: Left upper arm   Patient Position: Sitting   Pulse: 81  "  SpO2: 98%   Weight: 60.8 kg (134 lb)   Height: 1.676 m (5' 6\")     BP Readings from Last 3 Encounters:   12/04/24 108/70   11/25/24 (!) 144/80   10/28/24 98/66     Wt Readings from Last 3 Encounters:   12/04/24 60.8 kg (134 lb)   11/25/24 59.9 kg (132 lb)   10/28/24 61.3 kg (135 lb 3.2 oz)        Gen: no distress  HEENT: no scleral icterus  Neck: no JVD, no carotid bruit  Lungs: clear bilaterally; no crackles, rhonchi, wheezing  Heart/Chest: regular rate and rhythm; very soft systolic ejection murmur heard at right upper sternal border  Extremities: no edema  Neuro: nonfocal, alert and oriented  Psych: normal mood and affect     Diagnostic Data   Diagnostic data personally reviewed. Available medical records were reviewed and updated where appropriate including laboratory data, imaging studies, and previous outpatient and inpatient records.  Counseling/education performed.      Labs:  Lab Results   Component Value Date    WBC 5.90 12/01/2023    HGB 13.4 12/01/2023     12/01/2023    ALT 18 12/01/2023    AST 22 12/01/2023     12/01/2023    K 4.4 12/01/2023     12/01/2023    CREATININE 0.9 12/01/2023    BUN 20 12/01/2023    CO2 32 (H) 12/01/2023    TSH 2.33 12/01/2023    HGBA1C 5.8 (H) 12/16/2022       Lab Results   Component Value Date    CHOL 97 09/12/2024    CHOL 261 (H) 05/16/2024    LDLCALC 33 09/12/2024    LDLCALC 170 (H) 05/16/2024    HDL 54 09/12/2024    HDL 73 05/16/2024    TRIG 52 09/12/2024    TRIG 88 05/16/2024       Echocardiogram:  1/22/2021  LV systolic function without segmental wall motion normalities, EF 60%  Abnormal diastolic function  Calcified leaflet aortic valve with decreased motion but no hemodynamically significant stenosis  Mildly thickened anterior mitral valve leaflet with minimal mitral valve prolapse and mild MR, trace TR, estimated PASP of 26    Stress Tests:   Stress Myoview January 2019  Negative for ischemia and scar next normal LV wall motion EF in size  Stress " EKG negative for ischemia  Good exercise tolerance          Cardiac cath:      Vascular Studies:  No results found for this or any previous visit from the past 365 days.    2016 carotid ultrasound normal      Peripheral:  No results found for this or any previous visit from the past 365 days.      Cardiac Monitor:     ECG independently reviewed: NSR, LAD     Assessment and Plan        Ashleigh Peng is a 74 y.o. female who presents today for office follow-up.    #1.  Hyperlipidemia  Familial hypercholesterolemia with genetics and her grandson positive for autosomal dominant FH  -She has had dramatic reduction in LDL on combination of Repatha, Crestor, and Zetia.  I told her at this point that she could potentially get off of Zetia as I think that her lipid levels will still be excellent if she is off of it.  She will think about this and likely discontinue the Zetia.  -She does have evidence of asymptomatic ASCVD with coronary calcifications on CT scan.    #2.  Valvular heart disease   The patient has mitral valve prolapse which is minimal along with mild mitral regurgitation.  She also has a calcified trileaflet aortic valve with decreased motion but no hemodynamic aortic stenosis.  She has a very mild outflow murmur from her aortic valve.     #3.  Tobacco abuse   Cessation encouraged.  He has cut back significantly and just started on Chantix.         I will plan to see her again in about 6 months with lipid panel prior to that if discontinuing zetia.     Available medical records were reviewed and updated where appropriate including laboratory data, imaging studies, and previous outpatient and inpatient records.  Counseling/education performed.       Thank you for allowing me to participate in the care of this patient.    Kun Guzman MD     [facility] Office: [phone]  [facility] [place]: [facility]   12/4/2024

## 2025-01-08 NOTE — LETTER
December 4, 2024     Peter Solorzano MD  [address]  [address]  [facility] [address]    Patient: Ashleigh Peng  YOB: 1950  Date of Visit: 12/4/2024      Dear Dr. Solorzano:    Thank you for referring Ashleigh Peng to me for evaluation. Below are my notes for this consultation.    If you have questions, please do not hesitate to call me. I look forward to following your patient along with you.         Sincerely,        Kun Guzman MD        CC: No Recipients    Kun Guzman MD  12/4/2024 11:26 AM  Sign when Signing Visit     [facility] Cardiology  [place] Office       Patient ID: Ashleigh Peng 74 y.o. female 1950  PCP: Peter Solorzano MD      HPI     Ashleigh Peng is a 74 y.o. female I the pleasure seen in the office today for routine cardiovascular follow-up.    She has a background of familial hypercholesterolemia, coronary calcifications on CT scan, mild mitral valve prolapse and regurgitation, tobacco abuse and prior breast cancer.  Her grandson tested positive for LDLR gene mutation consistent with autosomal dominant familial hypercholesterolemia.    She presents today for routine follow-up.  She is on Repatha, Crestor, and Zetia and had blood work done recently on this combination showing dramatic reduction in cholesterol.  She feels well on the medications.  She has cut back on her smoking and just started Chantix a few days ago and feels that she is going to make a real attempt at quitting.    She got back from a cruise recently and had some shortness of breath about a week ago.  She was given a course of steroids and is back to normal.  She says other than this she feels great and is not having any chest pain, other shortness of breath, palpitations, syncope, lower extremity edema, or claudication.           Medications     Current Outpatient Medications   Medication Instructions   • aspirin 81 mg, Daily   • cholecalciferol (vitamin D3) 2,000 Units, Daily   •  "cyanocobalamin (VITAMIN B12) 1,000 mcg, Daily   • ezetimibe (ZETIA) 10 mg, oral, Daily   • multivitamin-iron-folic acid  mg-mcg tablet 1 tablet, Daily   • REPATHA SURECLICK 140 mg, subcutaneous, Every 14 days   • rosuvastatin (CRESTOR) 40 mg, oral, Nightly   • ubidecarenone (COENZYME Q10 ORAL) 1 tablet, Daily   • varenicline (CHANTIX) 1 mg, oral, 2 times daily, Take with full glass of water.         Allergies     No known allergies     Vital Signs/Exam     Vitals:    12/04/24 1043   BP: 108/70   BP Location: Left upper arm   Patient Position: Sitting   Pulse: 81   SpO2: 98%   Weight: 60.8 kg (134 lb)   Height: 1.676 m (5' 6\")     BP Readings from Last 3 Encounters:   12/04/24 108/70   11/25/24 (!) 144/80   10/28/24 98/66     Wt Readings from Last 3 Encounters:   12/04/24 60.8 kg (134 lb)   11/25/24 59.9 kg (132 lb)   10/28/24 61.3 kg (135 lb 3.2 oz)        Gen: no distress  HEENT: no scleral icterus  Neck: no JVD, no carotid bruit  Lungs: clear bilaterally; no crackles, rhonchi, wheezing  Heart/Chest: regular rate and rhythm; very soft systolic ejection murmur heard at right upper sternal border  Extremities: no edema  Neuro: nonfocal, alert and oriented  Psych: normal mood and affect     Diagnostic Data   Diagnostic data personally reviewed. Available medical records were reviewed and updated where appropriate including laboratory data, imaging studies, and previous outpatient and inpatient records.  Counseling/education performed.      Labs:  Lab Results   Component Value Date    WBC 5.90 12/01/2023    HGB 13.4 12/01/2023     12/01/2023    ALT 18 12/01/2023    AST 22 12/01/2023     12/01/2023    K 4.4 12/01/2023     12/01/2023    CREATININE 0.9 12/01/2023    BUN 20 12/01/2023    CO2 32 (H) 12/01/2023    TSH 2.33 12/01/2023    HGBA1C 5.8 (H) 12/16/2022       Lab Results   Component Value Date    CHOL 97 09/12/2024    CHOL 261 (H) 05/16/2024    LDLCALC 33 09/12/2024    LDLCALC 170 (H) " 05/16/2024    HDL 54 09/12/2024    HDL 73 05/16/2024    TRIG 52 09/12/2024    TRIG 88 05/16/2024       Echocardiogram:  1/22/2021  LV systolic function without segmental wall motion normalities, EF 60%  Abnormal diastolic function  Calcified leaflet aortic valve with decreased motion but no hemodynamically significant stenosis  Mildly thickened anterior mitral valve leaflet with minimal mitral valve prolapse and mild MR, trace TR, estimated PASP of 26    Stress Tests:   Stress Myoview January 2019  Negative for ischemia and scar next normal LV wall motion EF in size  Stress EKG negative for ischemia  Good exercise tolerance          Cardiac cath:      Vascular Studies:  No results found for this or any previous visit from the past 365 days.    2016 carotid ultrasound normal      Peripheral:  No results found for this or any previous visit from the past 365 days.      Cardiac Monitor:     ECG independently reviewed: NSR, ASIA     Assessment and Plan        Ashleigh Peng is a 74 y.o. female who presents today for office follow-up.    #1.  Hyperlipidemia  Familial hypercholesterolemia with genetics and her grandson positive for autosomal dominant FH  -She has had dramatic reduction in LDL on combination of Repatha, Crestor, and Zetia.  I told her at this point that she could potentially get off of Zetia as I think that her lipid levels will still be excellent if she is off of it.  She will think about this and likely discontinue the Zetia.  -She does have evidence of asymptomatic ASCVD with coronary calcifications on CT scan.    #2.  Valvular heart disease   The patient has mitral valve prolapse which is minimal along with mild mitral regurgitation.  She also has a calcified trileaflet aortic valve with decreased motion but no hemodynamic aortic stenosis.  She has a very mild outflow murmur from her aortic valve.     #3.  Tobacco abuse   Cessation encouraged.  He has cut back significantly and just started on  Chantix.         I will plan to see her again in about 6 months with lipid panel prior to that if discontinuing zetia.     Available medical records were reviewed and updated where appropriate including laboratory data, imaging studies, and previous outpatient and inpatient records.  Counseling/education performed.       Thank you for allowing me to participate in the care of this patient.    Kun Guzman MD     [facility] Office: [phone]  [facility] [place]: [facility]   12/4/2024      n/a

## 2025-01-14 RX ORDER — EVOLOCUMAB 140 MG/ML
INJECTION, SOLUTION SUBCUTANEOUS
Qty: 6 ML | Refills: 3 | Status: SHIPPED | OUTPATIENT
Start: 2025-01-14

## 2025-04-04 ENCOUNTER — RESULTS FOLLOW-UP (OUTPATIENT)
Dept: CARDIOLOGY | Facility: CLINIC | Age: 75
End: 2025-04-04

## 2025-04-04 ENCOUNTER — APPOINTMENT (OUTPATIENT)
Dept: LAB | Facility: CLINIC | Age: 75
End: 2025-04-04
Attending: FAMILY MEDICINE
Payer: MEDICARE

## 2025-04-04 ENCOUNTER — RESULTS FOLLOW-UP (OUTPATIENT)
Dept: PRIMARY CARE | Facility: CLINIC | Age: 75
End: 2025-04-04

## 2025-04-04 DIAGNOSIS — E78.2 MIXED HYPERLIPIDEMIA: Chronic | ICD-10-CM

## 2025-04-04 DIAGNOSIS — R73.01 IFG (IMPAIRED FASTING GLUCOSE): ICD-10-CM

## 2025-04-04 LAB
ALBUMIN SERPL-MCNC: 4.4 G/DL (ref 3.5–5.7)
ALP SERPL-CCNC: 53 IU/L (ref 34–125)
ALT SERPL-CCNC: 23 IU/L (ref 7–52)
ANION GAP SERPL CALC-SCNC: 6 MEQ/L (ref 3–15)
AST SERPL-CCNC: 27 IU/L (ref 13–39)
BASOPHILS # BLD: 0.04 K/UL (ref 0.01–0.1)
BASOPHILS NFR BLD: 0.7 %
BILIRUB SERPL-MCNC: 0.9 MG/DL (ref 0.3–1.2)
BUN SERPL-MCNC: 17 MG/DL (ref 7–25)
CALCIUM SERPL-MCNC: 9.9 MG/DL (ref 8.6–10.3)
CHLORIDE SERPL-SCNC: 106 MEQ/L (ref 98–107)
CHOLEST SERPL-MCNC: 125 MG/DL
CO2 SERPL-SCNC: 30 MEQ/L (ref 21–31)
CREAT SERPL-MCNC: 0.9 MG/DL (ref 0.6–1.2)
DIFFERENTIAL METHOD BLD: ABNORMAL
EGFRCR SERPLBLD CKD-EPI 2021: >60 ML/MIN/1.73M*2
EOSINOPHIL # BLD: 0.27 K/UL (ref 0.04–0.36)
EOSINOPHIL NFR BLD: 4.6 %
ERYTHROCYTE [DISTWIDTH] IN BLOOD BY AUTOMATED COUNT: 12.8 % (ref 11.7–14.4)
EST. AVERAGE GLUCOSE BLD GHB EST-MCNC: 108 MG/DL
GLUCOSE SERPL-MCNC: 110 MG/DL (ref 70–99)
HBA1C MFR BLD: 5.4 %
HCT VFR BLD AUTO: 40.3 % (ref 35–45)
HDLC SERPL-MCNC: 80 MG/DL
HDLC SERPL: 1.6 {RATIO}
HGB BLD-MCNC: 13.5 G/DL (ref 11.8–15.7)
IMM GRANULOCYTES # BLD AUTO: 0.01 K/UL (ref 0–0.08)
IMM GRANULOCYTES NFR BLD AUTO: 0.2 %
LDLC SERPL CALC-MCNC: 36 MG/DL
LYMPHOCYTES # BLD: 2.36 K/UL (ref 1.2–3.5)
LYMPHOCYTES NFR BLD: 40.5 %
MCH RBC QN AUTO: 31.9 PG (ref 28–33.2)
MCHC RBC AUTO-ENTMCNC: 33.5 G/DL (ref 32.2–35.5)
MCV RBC AUTO: 95.3 FL (ref 83–98)
MONOCYTES # BLD: 0.51 K/UL (ref 0.28–0.8)
MONOCYTES NFR BLD: 8.7 %
NEUTROPHILS # BLD: 2.64 K/UL (ref 1.7–7)
NEUTS SEG NFR BLD: 45.3 %
NONHDLC SERPL-MCNC: 45 MG/DL
NRBC BLD-RTO: 0 %
PLATELET # BLD AUTO: 279 K/UL (ref 150–369)
PMV BLD AUTO: 8.6 FL (ref 9.4–12.3)
POTASSIUM SERPL-SCNC: 4.3 MEQ/L (ref 3.5–5.1)
PROT SERPL-MCNC: 6.9 G/DL (ref 6–8.2)
RBC # BLD AUTO: 4.23 M/UL (ref 3.93–5.22)
SODIUM SERPL-SCNC: 142 MEQ/L (ref 136–145)
TRIGL SERPL-MCNC: 44 MG/DL
TSH SERPL DL<=0.05 MIU/L-ACNC: 3.22 MIU/L (ref 0.34–5.6)
WBC # BLD AUTO: 5.83 K/UL (ref 3.8–10.5)

## 2025-04-04 PROCEDURE — 83036 HEMOGLOBIN GLYCOSYLATED A1C: CPT

## 2025-04-04 PROCEDURE — 80053 COMPREHEN METABOLIC PANEL: CPT

## 2025-04-04 PROCEDURE — 80061 LIPID PANEL: CPT

## 2025-04-04 PROCEDURE — 84443 ASSAY THYROID STIM HORMONE: CPT

## 2025-04-04 PROCEDURE — 85025 COMPLETE CBC W/AUTO DIFF WBC: CPT

## 2025-04-04 PROCEDURE — 36415 COLL VENOUS BLD VENIPUNCTURE: CPT

## 2025-05-28 ENCOUNTER — TELEPHONE (OUTPATIENT)
Dept: PULMONOLOGY | Facility: HOSPITAL | Age: 75
End: 2025-05-28
Payer: MEDICARE

## 2025-05-28 DIAGNOSIS — F17.210 CIGARETTE SMOKER: Primary | ICD-10-CM

## 2025-06-11 ENCOUNTER — OFFICE VISIT (OUTPATIENT)
Dept: CARDIOLOGY | Facility: CLINIC | Age: 75
End: 2025-06-11
Payer: MEDICARE

## 2025-06-11 VITALS
HEART RATE: 95 BPM | OXYGEN SATURATION: 95 % | DIASTOLIC BLOOD PRESSURE: 68 MMHG | HEIGHT: 66 IN | WEIGHT: 139 LBS | SYSTOLIC BLOOD PRESSURE: 92 MMHG | BODY MASS INDEX: 22.34 KG/M2

## 2025-06-11 DIAGNOSIS — I25.10 CORONARY ARTERY DISEASE, UNSPECIFIED VESSEL OR LESION TYPE, UNSPECIFIED WHETHER ANGINA PRESENT, UNSPECIFIED WHETHER NATIVE OR TRANSPLANTED HEART: Primary | ICD-10-CM

## 2025-06-11 DIAGNOSIS — E78.49 FAMILIAL HYPERLIPIDEMIA: ICD-10-CM

## 2025-06-11 DIAGNOSIS — R01.1 MURMUR: ICD-10-CM

## 2025-06-11 DIAGNOSIS — E78.2 MIXED HYPERLIPIDEMIA: ICD-10-CM

## 2025-06-11 LAB
ATRIAL RATE: 74
P AXIS: 65
PR INTERVAL: 146
QRS DURATION: 80
QT INTERVAL: 394
QTC CALCULATION(BAZETT): 437
R AXIS: -30
T WAVE AXIS: 56
VENTRICULAR RATE: 74

## 2025-06-11 PROCEDURE — 93000 ELECTROCARDIOGRAM COMPLETE: CPT | Performed by: INTERNAL MEDICINE

## 2025-06-11 PROCEDURE — 99214 OFFICE O/P EST MOD 30 MIN: CPT | Performed by: INTERNAL MEDICINE

## 2025-06-11 NOTE — PROGRESS NOTES
"   [facility] Cardiology  [place] Office       Patient ID: Ashleigh Peng 74 y.o. female 1950  PCP: Peter Solorzano MD      HPI     Ashleigh Peng is a 74 y.o. female I the pleasure seen in the office today for routine cardiovascular follow-up.    She has a background of familial hypercholesterolemia, coronary calcifications on CT scan, mild mitral valve prolapse and regurgitation, tobacco abuse and prior breast cancer.  Her grandson tested positive for LDLR gene mutation consistent with autosomal dominant familial hypercholesterolemia.    She presents today for routine follow-up.  She is on Repatha, Crestor, and Zetia and had blood work done recently on this combination showing dramatic reduction in cholesterol.  She feels well on the medications.  She has cut back on her smoking to between 1 and 3 cigarettes/day and stopped taking Chantix because she was eating too much on it.      She is not having any chest pain, shortness of breath, palpitations, syncope, lower extremity edema, or claudication.           Medications     Current Outpatient Medications   Medication Instructions    aspirin 81 mg, Daily    cholecalciferol (vitamin D3) 2,000 Units, Daily    cyanocobalamin (VITAMIN B12) 1,000 mcg, Daily    ezetimibe (ZETIA) 10 mg, oral, Daily    multivitamin-iron-folic acid  mg-mcg tablet 1 tablet, Daily    REPATHA SURECLICK 140 mg/mL pen INJECT 140MG SUBCUTANEOUSLY  EVERY 2 WEEKS    rosuvastatin (CRESTOR) 40 mg, oral, Nightly    ubidecarenone (COENZYME Q10 ORAL) 1 tablet, Daily    varenicline tartrate (CHANTIX) 1 mg, oral, 2 times daily, Take with full glass of water.         Allergies     No known allergies     Vital Signs/Exam     Vitals:    06/11/25 1053   BP: 92/68   BP Location: Left upper arm   Patient Position: Sitting   Pulse: 95   SpO2: 95%   Weight: 63 kg (139 lb)   Height: 1.676 m (5' 6\")     BP Readings from Last 3 Encounters:   06/11/25 92/68   12/04/24 108/70   11/25/24 (!) 144/80     Wt " Readings from Last 3 Encounters:   06/11/25 63 kg (139 lb)   12/04/24 60.8 kg (134 lb)   11/25/24 59.9 kg (132 lb)        Gen: no distress  HEENT: no scleral icterus  Neck: no JVD, no carotid bruit  Lungs: clear bilaterally; no crackles, rhonchi, wheezing  Heart/Chest: regular rate and rhythm; very soft systolic ejection murmur heard at right upper sternal border  Extremities: no edema  Neuro: nonfocal, alert and oriented  Psych: normal mood and affect     Diagnostic Data   Diagnostic data personally reviewed. Available medical records were reviewed and updated where appropriate including laboratory data, imaging studies, and previous outpatient and inpatient records.  Counseling/education performed.      Labs:  Lab Results   Component Value Date    WBC 5.83 04/04/2025    HGB 13.5 04/04/2025     04/04/2025    ALT 23 04/04/2025    AST 27 04/04/2025     04/04/2025    K 4.3 04/04/2025     04/04/2025    CREATININE 0.9 04/04/2025    BUN 17 04/04/2025    CO2 30 04/04/2025    TSH 3.22 04/04/2025    HGBA1C 5.4 04/04/2025       Lab Results   Component Value Date    CHOL 125 04/04/2025    CHOL 97 09/12/2024    LDLCALC 36 04/04/2025    LDLCALC 33 09/12/2024    HDL 80 04/04/2025    HDL 54 09/12/2024    TRIG 44 04/04/2025    TRIG 52 09/12/2024       Echocardiogram:  1/22/2021  LV systolic function without segmental wall motion normalities, EF 60%  Abnormal diastolic function  Calcified leaflet aortic valve with decreased motion but no hemodynamically significant stenosis  Mildly thickened anterior mitral valve leaflet with minimal mitral valve prolapse and mild MR, trace TR, estimated PASP of 26    Stress Tests:   Stress Myoview January 2019  Negative for ischemia and scar next normal LV wall motion EF in size  Stress EKG negative for ischemia  Good exercise tolerance          Cardiac cath:      Vascular Studies:  No results found for this or any previous visit from the past 365 days.    2016 carotid  ultrasound normal      Peripheral:  No results found for this or any previous visit from the past 365 days.      Cardiac Monitor:     ECG independently reviewed: NSR, LAD     Assessment and Plan        Ashleigh Peng is a 74 y.o. female who presents today for office follow-up.    #1.  Hyperlipidemia  Familial hypercholesterolemia with genetics and her grandson positive for autosomal dominant FH  -She has had dramatic reduction in LDL on combination of Repatha, Crestor, and Zetia.  I told her at this point that she could potentially get off of Zetia as I think that her lipid levels will still be excellent if she is off of it.  She will think about this and likely discontinue the Zetia.  -She does have evidence of asymptomatic ASCVD with coronary calcifications on CT scan.    #2.  Valvular heart disease   The patient has mitral valve prolapse which is minimal along with mild mitral regurgitation.  She also has a calcified trileaflet aortic valve with decreased motion but no hemodynamic aortic stenosis.  She has a very mild outflow murmur from her aortic valve.     #3.  Tobacco abuse   Cessation encouraged.  He has cut back significantly.         I will plan to see her again in about 6 months.     Available medical records were reviewed and updated where appropriate including laboratory data, imaging studies, and previous outpatient and inpatient records.  Counseling/education performed.       Thank you for allowing me to participate in the care of this patient.    Kun Guzman MD     [facility] [place] Office: [phone]  [facility] [place]: [facility]   6/11/2025

## 2025-06-11 NOTE — LETTER
June 11, 2025     Peter Solorzano MD  [address]  [address]  [facility] [address]    Patient: Ashleigh Peng  YOB: 1950  Date of Visit: 6/11/2025      Dear Dr. Solorzano:    Thank you for referring Ashleigh Peng to me for evaluation. Below are my notes for this consultation.    If you have questions, please do not hesitate to call me. I look forward to following your patient along with you.         Sincerely,        Kun Guzman MD        CC: No Recipients    Kun Guzman MD  6/11/2025 12:37 PM  Sign when Signing Visit     [facility] Cardiology  [place] Office       Patient ID: Ashleigh Peng 74 y.o. female 1950  PCP: Peter Solorzano MD      HPI     Ashleigh Peng is a 74 y.o. female I the pleasure seen in the office today for routine cardiovascular follow-up.    She has a background of familial hypercholesterolemia, coronary calcifications on CT scan, mild mitral valve prolapse and regurgitation, tobacco abuse and prior breast cancer.  Her grandson tested positive for LDLR gene mutation consistent with autosomal dominant familial hypercholesterolemia.    She presents today for routine follow-up.  She is on Repatha, Crestor, and Zetia and had blood work done recently on this combination showing dramatic reduction in cholesterol.  She feels well on the medications.  She has cut back on her smoking to between 1 and 3 cigarettes/day and stopped taking Chantix because she was eating too much on it.      She is not having any chest pain, shortness of breath, palpitations, syncope, lower extremity edema, or claudication.           Medications     Current Outpatient Medications   Medication Instructions   • aspirin 81 mg, Daily   • cholecalciferol (vitamin D3) 2,000 Units, Daily   • cyanocobalamin (VITAMIN B12) 1,000 mcg, Daily   • ezetimibe (ZETIA) 10 mg, oral, Daily   • multivitamin-iron-folic acid  mg-mcg tablet 1 tablet, Daily   • REPATHA SURECLICK 140 mg/mL pen INJECT 140MG  "SUBCUTANEOUSLY  EVERY 2 WEEKS   • rosuvastatin (CRESTOR) 40 mg, oral, Nightly   • ubidecarenone (COENZYME Q10 ORAL) 1 tablet, Daily   • varenicline tartrate (CHANTIX) 1 mg, oral, 2 times daily, Take with full glass of water.         Allergies     No known allergies     Vital Signs/Exam     Vitals:    06/11/25 1053   BP: 92/68   BP Location: Left upper arm   Patient Position: Sitting   Pulse: 95   SpO2: 95%   Weight: 63 kg (139 lb)   Height: 1.676 m (5' 6\")     BP Readings from Last 3 Encounters:   06/11/25 92/68   12/04/24 108/70   11/25/24 (!) 144/80     Wt Readings from Last 3 Encounters:   06/11/25 63 kg (139 lb)   12/04/24 60.8 kg (134 lb)   11/25/24 59.9 kg (132 lb)        Gen: no distress  HEENT: no scleral icterus  Neck: no JVD, no carotid bruit  Lungs: clear bilaterally; no crackles, rhonchi, wheezing  Heart/Chest: regular rate and rhythm; very soft systolic ejection murmur heard at right upper sternal border  Extremities: no edema  Neuro: nonfocal, alert and oriented  Psych: normal mood and affect     Diagnostic Data   Diagnostic data personally reviewed. Available medical records were reviewed and updated where appropriate including laboratory data, imaging studies, and previous outpatient and inpatient records.  Counseling/education performed.      Labs:  Lab Results   Component Value Date    WBC 5.83 04/04/2025    HGB 13.5 04/04/2025     04/04/2025    ALT 23 04/04/2025    AST 27 04/04/2025     04/04/2025    K 4.3 04/04/2025     04/04/2025    CREATININE 0.9 04/04/2025    BUN 17 04/04/2025    CO2 30 04/04/2025    TSH 3.22 04/04/2025    HGBA1C 5.4 04/04/2025       Lab Results   Component Value Date    CHOL 125 04/04/2025    CHOL 97 09/12/2024    LDLCALC 36 04/04/2025    LDLCALC 33 09/12/2024    HDL 80 04/04/2025    HDL 54 09/12/2024    TRIG 44 04/04/2025    TRIG 52 09/12/2024       Echocardiogram:  1/22/2021  LV systolic function without segmental wall motion normalities, EF 60%  Abnormal " diastolic function  Calcified leaflet aortic valve with decreased motion but no hemodynamically significant stenosis  Mildly thickened anterior mitral valve leaflet with minimal mitral valve prolapse and mild MR, trace TR, estimated PASP of 26    Stress Tests:   Stress Myoview January 2019  Negative for ischemia and scar next normal LV wall motion EF in size  Stress EKG negative for ischemia  Good exercise tolerance          Cardiac cath:      Vascular Studies:  No results found for this or any previous visit from the past 365 days.    2016 carotid ultrasound normal      Peripheral:  No results found for this or any previous visit from the past 365 days.      Cardiac Monitor:     ECG independently reviewed: NSR, LAD     Assessment and Plan        Ashleigh Peng is a 74 y.o. female who presents today for office follow-up.    #1.  Hyperlipidemia  Familial hypercholesterolemia with genetics and her grandson positive for autosomal dominant FH  -She has had dramatic reduction in LDL on combination of Repatha, Crestor, and Zetia.  I told her at this point that she could potentially get off of Zetia as I think that her lipid levels will still be excellent if she is off of it.  She will think about this and likely discontinue the Zetia.  -She does have evidence of asymptomatic ASCVD with coronary calcifications on CT scan.    #2.  Valvular heart disease   The patient has mitral valve prolapse which is minimal along with mild mitral regurgitation.  She also has a calcified trileaflet aortic valve with decreased motion but no hemodynamic aortic stenosis.  She has a very mild outflow murmur from her aortic valve.     #3.  Tobacco abuse   Cessation encouraged.  He has cut back significantly.         I will plan to see her again in about 6 months.     Available medical records were reviewed and updated where appropriate including laboratory data, imaging studies, and previous outpatient and inpatient records.   Counseling/education performed.       Thank you for allowing me to participate in the care of this patient.    Kun Guzman MD     [facility] Office: [phone]  [facility] Call: [facility]   6/11/2025

## 2025-06-26 ENCOUNTER — HOSPITAL ENCOUNTER (OUTPATIENT)
Dept: RADIOLOGY | Facility: HOSPITAL | Age: 75
Discharge: HOME | End: 2025-06-26
Attending: FAMILY MEDICINE
Payer: MEDICARE

## 2025-06-26 DIAGNOSIS — F17.210 CIGARETTE SMOKER: ICD-10-CM

## 2025-06-26 PROCEDURE — 71271 CT THORAX LUNG CANCER SCR C-: CPT

## 2025-06-30 ENCOUNTER — RESULTS FOLLOW-UP (OUTPATIENT)
Dept: PRIMARY CARE | Facility: CLINIC | Age: 75
End: 2025-06-30

## 2025-08-29 ENCOUNTER — DOCUMENTATION (OUTPATIENT)
Dept: PRIMARY CARE | Facility: CLINIC | Age: 75
End: 2025-08-29
Payer: MEDICARE

## 2025-09-02 RX ORDER — ROSUVASTATIN CALCIUM 40 MG/1
40 TABLET, COATED ORAL NIGHTLY
Qty: 90 TABLET | Refills: 3 | Status: SHIPPED | OUTPATIENT
Start: 2025-09-02